# Patient Record
Sex: MALE | Race: WHITE | NOT HISPANIC OR LATINO | Employment: OTHER | URBAN - NONMETROPOLITAN AREA
[De-identification: names, ages, dates, MRNs, and addresses within clinical notes are randomized per-mention and may not be internally consistent; named-entity substitution may affect disease eponyms.]

---

## 2017-01-15 ENCOUNTER — APPOINTMENT (OUTPATIENT)
Dept: CT IMAGING | Facility: HOSPITAL | Age: 77
End: 2017-01-15

## 2017-01-15 ENCOUNTER — APPOINTMENT (OUTPATIENT)
Dept: GENERAL RADIOLOGY | Facility: HOSPITAL | Age: 77
End: 2017-01-15

## 2017-01-15 ENCOUNTER — HOSPITAL ENCOUNTER (INPATIENT)
Facility: HOSPITAL | Age: 77
LOS: 2 days | Discharge: HOME | End: 2017-01-17
Attending: FAMILY MEDICINE | Admitting: INTERNAL MEDICINE

## 2017-01-15 DIAGNOSIS — J40 BRONCHITIS: ICD-10-CM

## 2017-01-15 DIAGNOSIS — Z74.09 IMPAIRED FUNCTIONAL MOBILITY, BALANCE, GAIT, AND ENDURANCE: ICD-10-CM

## 2017-01-15 DIAGNOSIS — Z78.9 IMPAIRED MOBILITY AND ADLS: ICD-10-CM

## 2017-01-15 DIAGNOSIS — Z74.09 IMPAIRED MOBILITY AND ADLS: ICD-10-CM

## 2017-01-15 DIAGNOSIS — R13.10 DYSPHAGIA, UNSPECIFIED TYPE: ICD-10-CM

## 2017-01-15 DIAGNOSIS — R11.2 NAUSEA AND VOMITING, INTRACTABILITY OF VOMITING NOT SPECIFIED, UNSPECIFIED VOMITING TYPE: ICD-10-CM

## 2017-01-15 DIAGNOSIS — T17.908A ASPIRATION INTO AIRWAY, INITIAL ENCOUNTER: Primary | ICD-10-CM

## 2017-01-15 PROBLEM — I10 HYPERTENSION: Status: ACTIVE | Noted: 2017-01-15

## 2017-01-15 PROBLEM — G25.9 MOVEMENT DISORDER: Status: ACTIVE | Noted: 2017-01-15

## 2017-01-15 LAB
ALBUMIN SERPL-MCNC: 4.5 G/DL (ref 3.5–5)
ALBUMIN/GLOB SERPL: 1.2 G/DL (ref 1.1–2.5)
ALP SERPL-CCNC: 64 U/L (ref 24–120)
ALT SERPL W P-5'-P-CCNC: 36 U/L (ref 0–54)
AMYLASE SERPL-CCNC: 127 U/L (ref 30–110)
ANION GAP SERPL CALCULATED.3IONS-SCNC: 13 MMOL/L (ref 4–13)
ANION GAP SERPL CALCULATED.3IONS-SCNC: 14 MMOL/L (ref 4–13)
AST SERPL-CCNC: 30 U/L (ref 7–45)
BASOPHILS # BLD AUTO: 0.01 10*3/MM3 (ref 0–0.2)
BASOPHILS NFR BLD AUTO: 0.1 % (ref 0–2)
BILIRUB SERPL-MCNC: 0.7 MG/DL (ref 0.1–1)
BILIRUB UR QL STRIP: NEGATIVE
BUN BLD-MCNC: 15 MG/DL (ref 5–21)
BUN BLD-MCNC: 17 MG/DL (ref 5–21)
BUN/CREAT SERPL: 14.7 (ref 7–25)
BUN/CREAT SERPL: 16.3 (ref 7–25)
CALCIUM SPEC-SCNC: 8.4 MG/DL (ref 8.4–10.4)
CALCIUM SPEC-SCNC: 8.4 MG/DL (ref 8.4–10.4)
CHLORIDE SERPL-SCNC: 100 MMOL/L (ref 98–110)
CHLORIDE SERPL-SCNC: 97 MMOL/L (ref 98–110)
CLARITY UR: CLEAR
CO2 SERPL-SCNC: 26 MMOL/L (ref 24–31)
CO2 SERPL-SCNC: 26 MMOL/L (ref 24–31)
COLOR UR: YELLOW
CREAT BLD-MCNC: 1.02 MG/DL (ref 0.5–1.4)
CREAT BLD-MCNC: 1.04 MG/DL (ref 0.5–1.4)
DEPRECATED RDW RBC AUTO: 44.8 FL (ref 40–54)
DEPRECATED RDW RBC AUTO: 45 FL (ref 40–54)
EOSINOPHIL # BLD AUTO: 0.01 10*3/MM3 (ref 0–0.7)
EOSINOPHIL NFR BLD AUTO: 0.1 % (ref 0–4)
ERYTHROCYTE [DISTWIDTH] IN BLOOD BY AUTOMATED COUNT: 14 % (ref 12–15)
ERYTHROCYTE [DISTWIDTH] IN BLOOD BY AUTOMATED COUNT: 14.1 % (ref 12–15)
FLUAV AG NPH QL: NEGATIVE
FLUBV AG NPH QL IA: NEGATIVE
GFR SERPL CREATININE-BSD FRML MDRD: 70 ML/MIN/1.73
GFR SERPL CREATININE-BSD FRML MDRD: 71 ML/MIN/1.73
GLOBULIN UR ELPH-MCNC: 3.7 GM/DL
GLUCOSE BLD-MCNC: 105 MG/DL (ref 70–100)
GLUCOSE BLD-MCNC: 111 MG/DL (ref 70–100)
GLUCOSE UR STRIP-MCNC: NEGATIVE MG/DL
HCT VFR BLD AUTO: 40.2 % (ref 40–52)
HCT VFR BLD AUTO: 41.2 % (ref 40–52)
HGB BLD-MCNC: 14 G/DL (ref 14–18)
HGB BLD-MCNC: 14.3 G/DL (ref 14–18)
HGB UR QL STRIP.AUTO: NEGATIVE
HOLD SPECIMEN: NORMAL
HOLD SPECIMEN: NORMAL
IMM GRANULOCYTES # BLD: 0.08 10*3/MM3 (ref 0–0.03)
IMM GRANULOCYTES NFR BLD: 0.9 % (ref 0–5)
INR PPP: 1.09 (ref 0.91–1.09)
KETONES UR QL STRIP: NEGATIVE
LEUKOCYTE ESTERASE UR QL STRIP.AUTO: NEGATIVE
LIPASE SERPL-CCNC: 121 U/L (ref 23–203)
LYMPHOCYTES # BLD AUTO: 1.19 10*3/MM3 (ref 0.72–4.86)
LYMPHOCYTES # BLD MANUAL: 0.16 10*3/MM3 (ref 0.72–4.86)
LYMPHOCYTES NFR BLD AUTO: 12.8 % (ref 15–45)
LYMPHOCYTES NFR BLD MANUAL: 1 % (ref 15–45)
LYMPHOCYTES NFR BLD MANUAL: 20 % (ref 4–12)
MCH RBC QN AUTO: 30.5 PG (ref 28–32)
MCH RBC QN AUTO: 30.5 PG (ref 28–32)
MCHC RBC AUTO-ENTMCNC: 34.7 G/DL (ref 33–36)
MCHC RBC AUTO-ENTMCNC: 34.8 G/DL (ref 33–36)
MCV RBC AUTO: 87.6 FL (ref 82–95)
MCV RBC AUTO: 87.8 FL (ref 82–95)
METAMYELOCYTES NFR BLD MANUAL: 0 % (ref 0–0)
MONOCYTES # BLD AUTO: 3.22 10*3/MM3 (ref 0.19–1.3)
MONOCYTES # BLD AUTO: 4.28 10*3/MM3 (ref 0.19–1.3)
MONOCYTES NFR BLD AUTO: 46.1 % (ref 4–12)
NEUTROPHILS # BLD AUTO: 10.79 10*3/MM3 (ref 1.87–8.4)
NEUTROPHILS # BLD AUTO: 3.71 10*3/MM3 (ref 1.87–8.4)
NEUTROPHILS NFR BLD AUTO: 40 % (ref 39–78)
NEUTROPHILS NFR BLD MANUAL: 55 % (ref 39–78)
NEUTS BAND NFR BLD MANUAL: 12 % (ref 0–10)
NITRITE UR QL STRIP: NEGATIVE
PH UR STRIP.AUTO: <=5 [PH] (ref 5–8)
PLAT MORPH BLD: NORMAL
PLATELET # BLD AUTO: 120 10*3/MM3 (ref 130–400)
PLATELET # BLD AUTO: 123 10*3/MM3 (ref 130–400)
PMV BLD AUTO: 10.9 FL (ref 6–12)
PMV BLD AUTO: 11 FL (ref 6–12)
POIKILOCYTOSIS BLD QL SMEAR: ABNORMAL
POTASSIUM BLD-SCNC: 4 MMOL/L (ref 3.5–5.3)
POTASSIUM BLD-SCNC: 4.1 MMOL/L (ref 3.5–5.3)
PROT SERPL-MCNC: 8.2 G/DL (ref 6.3–8.7)
PROT UR QL STRIP: NEGATIVE
PROTHROMBIN TIME: 14.5 SECONDS (ref 11.9–14.6)
RBC # BLD AUTO: 4.59 10*6/MM3 (ref 4.8–5.9)
RBC # BLD AUTO: 4.69 10*6/MM3 (ref 4.8–5.9)
RBC MORPH BLD: NORMAL
SCAN SLIDE: NORMAL
SMALL PLATELETS BLD QL SMEAR: ABNORMAL
SMALL PLATELETS BLD QL SMEAR: NORMAL
SODIUM BLD-SCNC: 137 MMOL/L (ref 135–145)
SODIUM BLD-SCNC: 139 MMOL/L (ref 135–145)
SP GR UR STRIP: 1.02 (ref 1–1.03)
TROPONIN I SERPL-MCNC: 0 NG/ML (ref 0–0.07)
UROBILINOGEN UR QL STRIP: NORMAL
VARIANT LYMPHS NFR BLD MANUAL: 12 % (ref 0–5)
WBC MORPH BLD: NORMAL
WBC MORPH BLD: NORMAL
WBC NRBC COR # BLD: 16.11 10*3/MM3 (ref 4.8–10.8)
WBC NRBC COR # BLD: 9.28 10*3/MM3 (ref 4.8–10.8)
WHOLE BLOOD HOLD SPECIMEN: NORMAL
WHOLE BLOOD HOLD SPECIMEN: NORMAL

## 2017-01-15 PROCEDURE — 85025 COMPLETE CBC W/AUTO DIFF WBC: CPT | Performed by: FAMILY MEDICINE

## 2017-01-15 PROCEDURE — 25010000002 ONDANSETRON PER 1 MG: Performed by: FAMILY MEDICINE

## 2017-01-15 PROCEDURE — 74020 HC XR ABDOMEN FLAT & UPRIGHT: CPT

## 2017-01-15 PROCEDURE — 82150 ASSAY OF AMYLASE: CPT | Performed by: FAMILY MEDICINE

## 2017-01-15 PROCEDURE — 80053 COMPREHEN METABOLIC PANEL: CPT | Performed by: FAMILY MEDICINE

## 2017-01-15 PROCEDURE — 99285 EMERGENCY DEPT VISIT HI MDM: CPT

## 2017-01-15 PROCEDURE — 93005 ELECTROCARDIOGRAM TRACING: CPT | Performed by: FAMILY MEDICINE

## 2017-01-15 PROCEDURE — 87804 INFLUENZA ASSAY W/OPTIC: CPT | Performed by: FAMILY MEDICINE

## 2017-01-15 PROCEDURE — 70450 CT HEAD/BRAIN W/O DYE: CPT

## 2017-01-15 PROCEDURE — 81003 URINALYSIS AUTO W/O SCOPE: CPT | Performed by: FAMILY MEDICINE

## 2017-01-15 PROCEDURE — 93010 ELECTROCARDIOGRAM REPORT: CPT | Performed by: INTERNAL MEDICINE

## 2017-01-15 PROCEDURE — 83690 ASSAY OF LIPASE: CPT | Performed by: FAMILY MEDICINE

## 2017-01-15 PROCEDURE — 84484 ASSAY OF TROPONIN QUANT: CPT

## 2017-01-15 PROCEDURE — 85007 BL SMEAR W/DIFF WBC COUNT: CPT | Performed by: FAMILY MEDICINE

## 2017-01-15 PROCEDURE — 85610 PROTHROMBIN TIME: CPT | Performed by: FAMILY MEDICINE

## 2017-01-15 PROCEDURE — 71010 HC CHEST PA OR AP: CPT

## 2017-01-15 PROCEDURE — 25010000002 CEFTRIAXONE: Performed by: FAMILY MEDICINE

## 2017-01-15 PROCEDURE — 36415 COLL VENOUS BLD VENIPUNCTURE: CPT

## 2017-01-15 PROCEDURE — 25010000002 ENOXAPARIN PER 10 MG: Performed by: FAMILY MEDICINE

## 2017-01-15 RX ORDER — ONDANSETRON 4 MG/1
4 TABLET, ORALLY DISINTEGRATING ORAL 4 TIMES DAILY PRN
Qty: 20 TABLET | Refills: 0 | Status: SHIPPED | OUTPATIENT
Start: 2017-01-15 | End: 2017-01-17 | Stop reason: HOSPADM

## 2017-01-15 RX ORDER — CLONIDINE HYDROCHLORIDE 0.1 MG/1
0.1 TABLET ORAL ONCE
Status: COMPLETED | OUTPATIENT
Start: 2017-01-15 | End: 2017-01-15

## 2017-01-15 RX ORDER — SODIUM CHLORIDE 9 MG/ML
125 INJECTION, SOLUTION INTRAVENOUS CONTINUOUS
Status: DISCONTINUED | OUTPATIENT
Start: 2017-01-15 | End: 2017-01-17

## 2017-01-15 RX ORDER — LISINOPRIL 10 MG/1
5 TABLET ORAL DAILY
COMMUNITY

## 2017-01-15 RX ORDER — BENZONATATE 100 MG/1
100 CAPSULE ORAL 3 TIMES DAILY PRN
Qty: 15 CAPSULE | Refills: 0 | Status: SHIPPED | OUTPATIENT
Start: 2017-01-15 | End: 2017-01-17 | Stop reason: HOSPADM

## 2017-01-15 RX ORDER — SODIUM CHLORIDE 0.9 % (FLUSH) 0.9 %
1-10 SYRINGE (ML) INJECTION AS NEEDED
Status: DISCONTINUED | OUTPATIENT
Start: 2017-01-15 | End: 2017-01-17 | Stop reason: HOSPADM

## 2017-01-15 RX ORDER — DOXYCYCLINE 100 MG/1
100 CAPSULE ORAL 2 TIMES DAILY
Qty: 14 CAPSULE | Refills: 0 | Status: SHIPPED | OUTPATIENT
Start: 2017-01-15 | End: 2017-01-17

## 2017-01-15 RX ORDER — ASPIRIN 81 MG/1
81 TABLET ORAL DAILY
COMMUNITY
End: 2017-11-02 | Stop reason: HOSPADM

## 2017-01-15 RX ORDER — ONDANSETRON 2 MG/ML
4 INJECTION INTRAMUSCULAR; INTRAVENOUS ONCE
Status: COMPLETED | OUTPATIENT
Start: 2017-01-15 | End: 2017-01-15

## 2017-01-15 RX ORDER — ONDANSETRON 2 MG/ML
4 INJECTION INTRAMUSCULAR; INTRAVENOUS EVERY 6 HOURS PRN
Status: DISCONTINUED | OUTPATIENT
Start: 2017-01-15 | End: 2017-01-17 | Stop reason: HOSPADM

## 2017-01-15 RX ORDER — GUAIFENESIN, PSEUDOEPHEDRINE HYDROCHLORIDE 600; 60 MG/1; MG/1
1 TABLET, EXTENDED RELEASE ORAL EVERY 12 HOURS
COMMUNITY

## 2017-01-15 RX ADMIN — SODIUM CHLORIDE 500 ML: 0.9 INJECTION, SOLUTION INTRAVENOUS at 11:55

## 2017-01-15 RX ADMIN — ENOXAPARIN SODIUM 40 MG: 40 INJECTION SUBCUTANEOUS at 20:36

## 2017-01-15 RX ADMIN — CLONIDINE HYDROCHLORIDE 0.1 MG: 0.1 TABLET ORAL at 12:27

## 2017-01-15 RX ADMIN — ONDANSETRON 4 MG: 2 INJECTION INTRAMUSCULAR; INTRAVENOUS at 12:25

## 2017-01-15 RX ADMIN — CEFTRIAXONE 1 G: 1 INJECTION, POWDER, FOR SOLUTION INTRAMUSCULAR; INTRAVENOUS at 14:51

## 2017-01-15 NOTE — ED NOTES
Patient was given 6.25 mg phenergan and 250 ml NS bolus per ems.     Shazia Quevedo RN  01/15/17 3277

## 2017-01-15 NOTE — H&P
[facility] Medicine Services  HISTORY AND PHYSICAL    Date of Admission: 1/15/2017  Primary Care Physician: Brodie Pollard MD    Subjective     Chief Complaint:   Cough, choking and nausea.    History of Present Illness  This 76-year-old white male patient has a four-day history of a cough with flulike symptoms.  The patient is very tangential in answers to questions.  Family members complaining of slurred speech and difficulty with articulation.  I will in the emergency room he had an episode consistent with aspiration with respiratory distress.  He appears be having difficulty handling oral secretions.        Review of Systems   HENT: Positive for voice change.    Eyes: Negative.    Respiratory: Positive for cough and choking.    Cardiovascular: Negative.    Gastrointestinal: Positive for nausea.   Endocrine: Negative.    Genitourinary: Negative.    Musculoskeletal: Negative.    Skin: Negative.    Allergic/Immunologic: Negative.    Neurological: Positive for speech difficulty.   Hematological: Negative.    Psychiatric/Behavioral: Negative.             Otherwise complete ROS reviewed and negative except as mentioned in the HPI.      Past Medical History:     Past Medical History   Diagnosis Date   • Atypical chest pain    • Hypertension    • Sciatica        Past Surgical History:  Past Surgical History   Procedure Laterality Date   • Cholecystectomy     • Hernia repair     • Calcaneous open reduction internal fixation Bilateral        Family History:   family history includes Cancer in his mother; No Known Problems in his brother, brother, father, sister, and sister.    Social History:    reports that he has quit smoking. He has a 30.00 pack-year smoking history. He does not have any smokeless tobacco history on file. He reports that he does not drink alcohol or use illicit drugs.    Medications:  Prior to Admission medications    Medication Sig Start Date End Date Taking?  "Authorizing Provider   aspirin 81 MG EC tablet Take 81 mg by mouth Daily.   Yes Historical Provider, MD   lisinopril (PRINIVIL,ZESTRIL) 10 MG tablet Take 5 mg by mouth Daily.   Yes Historical Provider, MD   pseudoephedrine-guaifenesin (MUCINEX D)  MG per 12 hr tablet Take 1 tablet by mouth Every 12 (Twelve) Hours.   Yes Historical Provider, MD   benzonatate (TESSALON) 100 MG capsule Take 1 capsule by mouth 3 (Three) Times a Day As Needed for cough for up to 15 doses. 1/15/17   Melo Hayward MD   doxycycline (MONODOX) 100 MG capsule Take 1 capsule by mouth 2 (Two) Times a Day for 7 days. 1/15/17 1/22/17  Melo Hayward MD   ondansetron ODT (ZOFRAN-ODT) 4 MG disintegrating tablet Take 1 tablet by mouth 4 (Four) Times a Day As Needed for nausea or vomiting. 1/15/17   Melo Hayward MD       Allergies:  No Known Allergies    Objective     Vital Signs:   Visit Vitals   • /81   • Pulse 100   • Temp 99.6 °F (37.6 °C) (Oral)   • Resp 25   • Ht 71\" (180.3 cm)   • Wt 175 lb (79.4 kg)   • SpO2 94%   • BMI 24.41 kg/m2       Physical Exam   Constitutional: He is oriented to person, place, and time. He appears well-developed and well-nourished. No distress.   HENT:   Head: Normocephalic and atraumatic.   Right Ear: External ear normal.   Left Ear: External ear normal.   Nose: Nose normal.   Mouth/Throat: Oropharynx is clear and moist.   Eyes: Conjunctivae and EOM are normal. Pupils are equal, round, and reactive to light. No scleral icterus.   Neck: Normal range of motion. Neck supple. No JVD present. No tracheal deviation present. No thyromegaly present.   Cardiovascular: Normal rate, regular rhythm, normal heart sounds and intact distal pulses.  Exam reveals no friction rub.    No murmur heard.  Pulmonary/Chest: Effort normal and breath sounds normal. No respiratory distress. He has no wheezes. He has no rales.   Abdominal: Soft. Bowel sounds are normal. He exhibits no distension and no mass. There is " no tenderness.   Musculoskeletal: Normal range of motion. He exhibits no edema or tenderness.   Neurological: He is alert and oriented to person, place, and time.   Reflex Scores:       Brachioradialis reflexes are 3+ on the right side and 3+ on the left side.       Patellar reflexes are 2+ on the right side and 1+ on the left side.  The patient displays athetoid-like fidgety movements of the upper extremities, neck and head.  His speech is slurred and difficult to understand and he is having difficulty organizing swallow of mucoid secretions.   Skin: Skin is warm and dry. No rash noted. No erythema.   Psychiatric: He has a normal mood and affect. His behavior is normal. Judgment and thought content normal.           Results Reviewed:  Lab Results (last 24 hours)     Procedure Component Value Units Date/Time    POC Troponin, Rapid [[id number]]  (Normal) Collected:  01/15/17 1222    Specimen:  Blood Updated:  01/15/17 1235     Troponin I 0.00 ng/mL       Serial Number: [id number]    : [id number]       Comprehensive Metabolic Panel [[id number]]  (Abnormal) Collected:  01/15/17 1208    Specimen:  Blood from Arm, Right Updated:  01/15/17 1246     Glucose 105 (H) mg/dL      BUN 17 mg/dL      Creatinine 1.04 mg/dL      Sodium 139 mmol/L      Potassium 4.1 mmol/L      Chloride 100 mmol/L      CO2 26.0 mmol/L      Calcium 8.4 mg/dL      Total Protein 8.2 g/dL      Albumin 4.50 g/dL      ALT (SGPT) 36 U/L      AST (SGOT) 30 U/L      Alkaline Phosphatase 64 U/L      Total Bilirubin 0.7 mg/dL      eGFR Non African Amer 70 mL/min/1.73      Globulin 3.7 gm/dL      A/G Ratio 1.2 g/dL      BUN/Creatinine Ratio 16.3      Anion Gap 13.0 mmol/L     Narrative:       The MDRD GFR formula is only valid for adults with stable renal function between ages 18 and 70.    Amylase [[id number]]  (Abnormal) Collected:  01/15/17 1208    Specimen:  Blood from Arm, Right Updated:  01/15/17 1246     Amylase 127 (H) U/L     Lipase [[id number]]  (Normal)  Collected:  01/15/17 1208    Specimen:  Blood from Arm, Right Updated:  01/15/17 1246     Lipase 121 U/L     Influenza Antigen [[id number]]  (Normal) Collected:  01/15/17 1203    Specimen:  Swab from Nasopharynx Updated:  01/15/17 1254     Influenza A Ag, EIA Negative      Influenza B Ag, EIA Negative     CBC Auto Differential [[id number]]  (Abnormal) Collected:  01/15/17 1208    Specimen:  Blood from Arm, Right Updated:  01/15/17 1258     WBC 9.28 10*3/mm3      RBC 4.69 (L) 10*6/mm3      Hemoglobin 14.3 g/dL      Hematocrit 41.2 %      MCV 87.8 fL      MCH 30.5 pg      MCHC 34.7 g/dL      RDW 14.0 %      RDW-SD 45.0 fl      MPV 11.0 fL      Platelets 120 (L) 10*3/mm3      Neutrophil % 40.0 %      Lymphocyte % 12.8 (L) %      Monocyte % 46.1 (H) %      Eosinophil % 0.1 %      Basophil % 0.1 %      Immature Grans % 0.9 %      Neutrophils, Absolute 3.71 10*3/mm3      Lymphocytes, Absolute 1.19 10*3/mm3      Monocytes, Absolute 4.28 (H) 10*3/mm3      Eosinophils, Absolute 0.01 10*3/mm3      Basophils, Absolute 0.01 10*3/mm3      Immature Grans, Absolute 0.08 (H) 10*3/mm3     Scan Slide [[id number]] Collected:  01/15/17 1208    Specimen:  Blood from Arm, Right Updated:  01/15/17 1258     RBC Morphology Normal      WBC Morphology Normal      Platelet Estimate Decreased     Urinalysis With / Culture If Indicated [[id number]]  (Normal) Collected:  01/15/17 1326    Specimen:  Urine from Urine, Clean Catch Updated:  01/15/17 1340     Color, UA Yellow      Appearance, UA Clear      pH, UA <=5.0      Specific Gravity, UA 1.018      Glucose, UA Negative      Ketones, UA Negative      Bilirubin, UA Negative      Blood, UA Negative      Protein, UA Negative      Leuk Esterase, UA Negative      Nitrite, UA Negative      Urobilinogen, UA 0.2 E.U./dL       Auto resulted.             Xr Abdomen Flat & Upright    Result Date: 1/15/2017  Narrative: ABDOMEN FLAT AND UPRIGHT 1/15/2017 1:20 PM CST  HISTORY: Nausea  COMPARISON: 01/19/2012   FINDINGS: The lung bases are clear. There is a nonspecific bowel gas pattern with gas in both small bowel and colon. No evidence of mechanical obstruction.. No free intraperitoneal air is present. No pathologic calcification is seen.  The osseous structures are normal in appearance.      Impression: Nonspecific bowel gas pattern. No evidence of mechanical obstruction or pneumoperitoneum..  This report was finalized on 01/15/2017 13:41 by Dr. Otilio Garcia MD.    Ct Head Without Contrast    Result Date: 1/15/2017  Narrative: CT BRAIN without contrast 1/15/2017 2:35 PM CST  HISTORY: Speech issues. Involuntary movement.  COMPARISON: 03/16/2016  DLP: 1330 mGy cm. Automated exposure control was utilized to diminish patient radiation dose.  TECHNIQUE: Serial axial tomographic images of the brain were obtained without the use of intravenous contrast.  FINDINGS: The midline structures are nondisplaced. There is moderate cerebral and cerebellar volume loss, with an associated increase in the prominence of the ventricles and sulci. The basilar cisterns are normal in size and configuration. There is no evidence of intracranial hemorrhage or mass-effect. There is low attenuation in the periventricular white matter, consistent with chronic ischemic change. There are no abnormal extra-axial fluid collections. There is no evidence of tonsillar herniation.  The orbits are intact. The patient has undergone previous fixation for a left superior orbital rim fracture.. The visualized paranasal sinuses, mastoid air cells and middle ear cavities are clear. The visualized osseous structures and overlying soft tissues of the skull and face are intact.      Impression: Moderate cerebral and cerebellar volume loss with chronic microvascular disease but no evidence of acute intracranial process.   This report was finalized on 01/15/2017 14:51 by Dr. Otilio Garcia MD.    Xr Chest 1 View    Result Date: 1/15/2017  Narrative: HISTORY:  Cough.  FINDINGS: Today's exam is compared to previous study dated 01/19/2012. There are emphysematous changes within the upper lobes. There is no evidence of acute lobar pneumonia or effusion. There is some tortuosity of the thoracic aorta. There is an old chronic right a.c. separation.      Impression: . No active disease. This report was finalized on 01/15/2017 14:01 by Dr. Otilio Garcia MD.     I have personally reviewed and interpreted the radiology studies and ECG obtained at time of admission.     Assessment / Plan      Assessment & Plan  Hospital Problem List     Aspiration into airway    Hypertension    Movement disorder      Possible [place]'s Chorea    PLAN:     Admit to medical surgical floor  Clindamycin 300 mg every 8 hours  Neurology consultation.  MRI scan of the brain  PT/OT eval/Tx    Code Status:   DO NOT RESUSCITATE aggressive  Surrogate decision maker is the patient's daughter      I discussed the patients findings and my recommendations with: patient    Estimated length of stay: 3-4 days    Chito Klein DO   01/15/17   5:39 PM

## 2017-01-15 NOTE — ED PROVIDER NOTES
Subjective   HPI Comments: Patient began coughing about 4 days ago and felt like he had the flu.  He has not sought medical care until today.  He reports that he never takes the flu shot.  This patient is very difficult to get a history from as he appears incapable of answering a question directly.  He has answered questions asked in a very indirect fashion.  My visit with him last year was the same, at that time the brother indicated that is his normal behavior.     Sister is here and she reports that the patients speech is different.  I advised her that I noticed that his congestion and sore throat have altered the sound of his voice.  The sister states that he has had difficulty articulating the english language since he lived in [place] for so long.     While attempting to discharge the patient had episodes that resemble aspiration with respirtory distress, patient now appears to be having some difficulty with oral secretions.    Patient is a 76 y.o. male presenting with nausea.   Nausea   The primary symptoms include abdominal pain, nausea, vomiting, diarrhea, myalgias and arthralgias. The illness began today. The onset was sudden.   Nausea began today. The nausea is associated with eating. The nausea is exacerbated by food and activity.   The vomiting began today. Vomiting occurs 2 to 5 times per day. The emesis contains stomach contents.   The myalgias are associated with weakness.   The illness is also significant for chills and anorexia. The illness does not include constipation.       Review of Systems   Constitutional: Positive for activity change, appetite change and chills.   HENT: Positive for congestion and rhinorrhea. Negative for drooling, ear discharge, ear pain, facial swelling, hearing loss, mouth sores, nosebleeds, postnasal drip, sore throat, trouble swallowing and voice change.    Respiratory: Positive for cough and shortness of breath. Negative for apnea, choking and chest tightness.     Cardiovascular: Negative for chest pain, palpitations and leg swelling.   Gastrointestinal: Positive for abdominal pain, anorexia, diarrhea, nausea and vomiting. Negative for abdominal distention, blood in stool and constipation.   Genitourinary: Negative for decreased urine volume, difficulty urinating, flank pain, hematuria and testicular pain.   Musculoskeletal: Positive for arthralgias and myalgias.   Skin: Positive for color change and pallor.   Neurological: Positive for weakness. Negative for dizziness and light-headedness.   Psychiatric/Behavioral: Negative for agitation and confusion. The patient is nervous/anxious.        Past Medical History   Diagnosis Date   • Atypical chest pain    • Hypertension    • Sciatica        No Known Allergies    Past Surgical History   Procedure Laterality Date   • Cholecystectomy     • Hernia repair     • Calcaneous open reduction internal fixation Bilateral        History reviewed. No pertinent family history.    Social History     Social History   • Marital status: Single     Spouse name: N/A   • Number of children: N/A   • Years of education: N/A     Social History Main Topics   • Smoking status: Never Smoker   • Smokeless tobacco: None   • Alcohol use No   • Drug use: No   • Sexual activity: Defer     Other Topics Concern   • None     Social History Narrative   • None           Objective   Physical Exam   Constitutional: He is oriented to person, place, and time. He appears well-developed and well-nourished. No distress.   HENT:   Head: Atraumatic.   Nose: Nose normal.   Mouth/Throat: Mucous membranes are pale and dry.   Eyes: Conjunctivae are normal. Pupils are equal, round, and reactive to light. No scleral icterus.   Neck: Normal range of motion. Neck supple. No JVD present. No thyromegaly present.   Cardiovascular: Normal rate, regular rhythm, normal heart sounds and intact distal pulses.    No murmur heard.  Pulmonary/Chest: Effort normal and breath sounds normal.  No respiratory distress. He has no wheezes. He has no rales. He exhibits no tenderness.   Mild upper airway congestion.   Abdominal: Soft. Bowel sounds are normal. He exhibits no distension and no mass. There is tenderness. There is no rebound and no guarding.       Musculoskeletal: Normal range of motion. He exhibits no edema.   Lymphadenopathy:     He has no cervical adenopathy.   Neurological: He is alert and oriented to person, place, and time. No cranial nerve deficit.   movent that to an extent resembles chorea     Skin: Skin is warm and dry. No rash noted. He is not diaphoretic. No erythema. No pallor.   Psychiatric: He has a normal mood and affect. His behavior is normal. Judgment and thought content normal.   Nursing note and vitals reviewed.      Procedures         ED Course  ED Course        Labs Reviewed   COMPREHENSIVE METABOLIC PANEL - Abnormal; Notable for the following:        Result Value    Glucose 105 (*)     All other components within normal limits    Narrative:     The MDRD GFR formula is only valid for adults with stable renal function between ages 18 and 70.   AMYLASE - Abnormal; Notable for the following:     Amylase 127 (*)     All other components within normal limits   CBC WITH AUTO DIFFERENTIAL - Abnormal; Notable for the following:     RBC 4.69 (*)     Platelets 120 (*)     Lymphocyte % 12.8 (*)     Monocyte % 46.1 (*)     Monocytes, Absolute 4.28 (*)     Immature Grans, Absolute 0.08 (*)     All other components within normal limits   INFLUENZA ANTIGEN - Normal    Narrative:     Recommend confirmation of negative results by viral culture or molecular assay.   LIPASE - Normal   URINALYSIS W/ CULTURE IF INDICATED - Normal    Narrative:     Urine microscopic not indicated.   POCT TROPONIN I, RAPID - Normal   SCAN SLIDE   RAINBOW DRAW    Narrative:     The following orders were created for panel order [place] Draw.  Procedure                               Abnormality         Status                      ---------                               -----------         ------                     Light Blue Top[[id number]]                                    In process                 Green Top (Gel)[[id number]]                                   In process                 Lavender Top[[id number]]                                      In process                 Red Top[[id number]]                                           In process                 Green Top (No Gel)[[id number]]                                                             Please view results for these tests on the individual orders.   POCT TROPONIN I, RAPID   CBC AND DIFFERENTIAL    Narrative:     The following orders were created for panel order CBC & Differential.  Procedure                               Abnormality         Status                     ---------                               -----------         ------                     Scan Slide[[id number]]                                        Final result               CBC Auto Differential[[id number]]         Abnormal            Final result                 Please view results for these tests on the individual orders.   LIGHT BLUE TOP   GREEN TOP   LAVENDER TOP   RED TOP   GREEN TOP NO GEL     CT Head Without Contrast   Final Result   Moderate cerebral and cerebellar volume loss with chronic microvascular   disease but no evidence of acute intracranial process.           This report was finalized on 01/15/2017 14:51 by Dr. Otilio Garcia MD.      XR Chest 1 View   Final Result   . No active disease.   This report was finalized on 01/15/2017 14:01 by Dr. Otilio Garcia MD.      XR Abdomen Flat & Upright   Final Result   Nonspecific bowel gas pattern. No evidence of mechanical obstruction or   pneumoperitoneum..       This report was finalized on 01/15/2017 13:41 by Dr. Otilio Garcia MD.                  MDM  Number of Diagnoses or Management Options  Aspiration into airway, initial encounter:  new and requires workup  Bronchitis: new and requires workup  Nausea and vomiting, intractability of vomiting not specified, unspecified vomiting type: new and requires workup     Amount and/or Complexity of Data Reviewed  Clinical lab tests: ordered and reviewed  Tests in the radiology section of CPT®: ordered and reviewed  Decide to obtain previous medical records or to obtain history from someone other than the patient: yes  Obtain history from someone other than the patient: yes  Review and summarize past medical records: yes  Discuss the patient with other providers: yes  Independent visualization of images, tracings, or specimens: yes    Risk of Complications, Morbidity, and/or Mortality  Presenting problems: high  Diagnostic procedures: high  Management options: high    Patient Progress  Patient progress: other (comment) (Patient has had worsening of his symptoms since arrival, no tpa considered due to sub acute nature of symptoms.  Onset was possibly days ago.  )      Final diagnoses:   Bronchitis   Nausea and vomiting, intractability of vomiting not specified, unspecified vomiting type   Aspiration into airway, initial encounter            Melo Hayward MD  01/15/17 2623

## 2017-01-15 NOTE — ED NOTES
Patient had c/o dizziness and became pale. Patient had syncopal episode while sitting up in wheelchair. Dr. Hayward and several RNs at bedside. Patient placed back on stretcher and on monitor.      Shazia Quevedo RN  01/15/17 7412

## 2017-01-15 NOTE — ED NOTES
Patient states he has recently had the flu. Patient states he has not been treated, but has had flu like symptoms. Patient has had productive cough. Patient states he has been coughing so much he vomits. Patient coughing and spitting up thick, green-colored mucus.     Shazia Quevedo RN  01/15/17 3766

## 2017-01-15 NOTE — IP AVS SNAPSHOT
AFTER VISIT SUMMARY             Clay Rivas           About your hospitalization     You were admitted on:  January 15, 2017 You last received care in the:  [address]       Procedures & Surgeries         Medications    If you or your caregiver advised us that you are currently taking a medication and that medication is marked below as “Resume”, this simply indicates that we have reviewed those medications to make sure our new therapy recommendations do not interfere.  If you have concerns about medications other than those new ones which we are prescribing today, please consult the physician who prescribed them (or your primary physician).  Our review of your home medications is not meant to indicate that we are directing their use.             Your Medications      START taking these medications     doxycycline 100 MG capsule   Take 1 capsule by mouth 2 (Two) Times a Day for 7 days.   Commonly known as:  MONODOX           MethylPREDNISolone 4 MG tablet   Take as directed on package instructions.   Commonly known as:  MEDROL (OWEN)             CONTINUE taking these medications     aspirin 81 MG EC tablet   Take 81 mg by mouth Daily.           lisinopril 10 MG tablet   Take 5 mg by mouth Daily.   Commonly known as:  PRINIVIL,ZESTRIL           pseudoephedrine-guaifenesin  MG per 12 hr tablet   Take 1 tablet by mouth Every 12 (Twelve) Hours.   Commonly known as:  MUCINEX D                Where to Get Your Medications      You can get these medications from any pharmacy     Bring a paper prescription for each of these medications     doxycycline 100 MG capsule    MethylPREDNISolone 4 MG tablet                  Your Medications      Your Medication List           Morning Noon Evening Bedtime As Needed    aspirin 81 MG EC tablet   Take 81 mg by mouth Daily.                                   doxycycline 100 MG capsule   Take 1 capsule by mouth 2 (Two) Times a Day for 7 days.   Commonly known  as:  MONODOX                                      lisinopril 10 MG tablet   Take 5 mg by mouth Daily.   Commonly known as:  PRINIVIL,ZESTRIL                                   MethylPREDNISolone 4 MG tablet   Take as directed on package instructions.   Commonly known as:  MEDROL (OWEN)                                   pseudoephedrine-guaifenesin  MG per 12 hr tablet   Take 1 tablet by mouth Every 12 (Twelve) Hours.   Commonly known as:  MUCINEX D                                               Instructions for After Discharge        Activity Instructions     Activity as Tolerated                 Diet Instructions     Diet: Consistent Carbohydrate, Cardiac, Soft Texture; Pudding Thick Liquids; Ground       Discharge Diet:   Consistent Carbohydrate  Cardiac  Soft Texture      Fluid Consistency:  Pudding Thick Liquids   Soft Options:  Ground   Diet: ground, soft, pudding thick       Diet: Consistent Carbohydrate, Cardiac, Soft Texture; Pudding Thick Liquids; Ground       Discharge Diet:   Consistent Carbohydrate  Cardiac  Soft Texture      Fluid Consistency:  Pudding Thick Liquids   Soft Options:  Ground   pudding thick liquid, mechanical soft consistency food. Medications should be taken crushed with puree. May have water and Ice between meals after oral care, under staff or family supervision and with the recommended strategies for safe swallowing.  Recommended Strategies: Upright for PO and small bites and sips. Oral care before breakfast, after all meals and PRN             Discharge References/Attachments     NAUSEA, ADULT, EASY-TO-READ (ENGLISH)    UPPER RESPIRATORY INFECTION, ADULT, EASY-TO-READ (ENGLISH)    DOXYCYCLINE TABLETS OR CAPSULES (ENGLISH)    METHYLPREDNISOLONE TABLETS (ENGLISH)    ASPIRATION PRECAUTIONS (ENGLISH)       Follow-ups for After Discharge        Follow-up Information     Follow up with Brodie Pollard MD Follow up on 1/24/2017.    Specialty:  Family Medicine    Why:  @[id number]    Contact  information:    [address] [place] [address]  [phone]        [facility] Signup     [facility] allows you to send messages to your doctor, view your test results, renew your prescriptions, schedule appointments, and more. To sign up, go to [employer] and click on the Sign Up Now link in the New User? box. Enter your [facility] Activation Code exactly as it appears below along with the last four digits of your Social Security Number and your Date of Birth () to complete the sign-up process. If you do not sign up before the expiration date, you must request a new code.    [facility] Activation Code: [email or web address]  Expires: 2017  3:01 PM    If you have questions, you can email [email or web address] or call [phone] to talk to our [facility] staff. Remember, [facility] is NOT to be used for urgent needs. For medical emergencies, dial 911.           Summary of Your Hospitalization        Reason for Hospitalization     Your primary diagnosis was:  Not on File    Your diagnoses also included:  Aspiration Into Airway, High Blood Pressure, Movement Disorder      Care Providers     Provider Service Role Specialty    Jose Manuel Joy DO -- Attending Provider Hospitalist    Camilla Jones MD Neurology Consulting Physician  Neurology       Your Allergies  Date Reviewed: 2017    No active allergies      Pending Labs     Order Current Status    CHRIST[facility] Comprehensive Panel In process      Patient Belongings Returned     Document Return of Belongings Flowsheet     Were the patient bedside belongings sent home?   Yes   Belongings Retrieved from Security & Sent Home   N/A    Belongings Sent to Safe   --   Medications Retrieved from Pharmacy & Sent Home   N/A              More Information      Nausea, Adult  Nausea means you feel sick to your stomach or need to throw up (vomit). It may be a sign of a more serious problem. If nausea gets worse, you may throw up. If you throw up a  "lot, you may lose too much body fluid (dehydration).  HOME CARE   · Get plenty of rest.  · Ask your doctor how to replace body fluid losses (rehydrate).  · Eat small amounts of food. Sip liquids more often.  · Take all medicines as told by your doctor.  GET HELP RIGHT AWAY IF:  · You have a fever.  · You pass out (faint).  · You keep throwing up or have blood in your throw up.  · You are very weak, have dry lips or a dry mouth, or you are very thirsty (dehydrated).  · You have dark or bloody poop (stool).  · You have very bad chest or belly (abdominal) pain.  · You do not get better after 2 days, or you get worse.  · You have a headache.  MAKE SURE YOU:  · Understand these instructions.  · Will watch your condition.  · Will get help right away if you are not doing well or get worse.     This information is not intended to replace advice given to you by your health care provider. Make sure you discuss any questions you have with your health care provider.     Document Released: 12/06/2012 Document Revised: 03/11/2013 Document Reviewed: 12/06/2012  [employer] Interactive Patient Education ©2016 [employer]          Upper Respiratory Infection, Adult  Most upper respiratory infections (URIs) are caused by a virus. A URI affects the nose, throat, and upper air passages. The most common type of URI is often called \"the common cold.\"  HOME CARE   · Take medicines only as told by your doctor.  · Gargle warm saltwater or take cough drops to comfort your throat as told by your doctor.  · Use a warm mist humidifier or inhale steam from a shower to increase air moisture. This may make it easier to breathe.  · Drink enough fluid to keep your pee (urine) clear or pale yellow.  · Eat soups and other clear broths.  · Have a healthy diet.  · Rest as needed.  · Go back to work when your fever is gone or your doctor says it is okay.  ¨ You may need to stay home longer to avoid giving your URI to others.  ¨ You can also wear a face " mask and wash your hands often to prevent spread of the virus.  · Use your inhaler more if you have asthma.  · Do not use any tobacco products, including cigarettes, chewing tobacco, or electronic cigarettes. If you need help quitting, ask your doctor.  GET HELP IF:  · You are getting worse, not better.  · Your symptoms are not helped by medicine.  · You have chills.  · You are getting more short of breath.  · You have brown or red mucus.  · You have yellow or brown discharge from your nose.  · You have pain in your face, especially when you bend forward.  · You have a fever.  · You have puffy (swollen) neck glands.  · You have pain while swallowing.  · You have white areas in the back of your throat.  GET HELP RIGHT AWAY IF:   · You have very bad or constant:    Headache.    Ear pain.    Pain in your forehead, behind your eyes, and over your cheekbones (sinus pain).    Chest pain.  · You have long-lasting (chronic) lung disease and any of the following:    Wheezing.    Long-lasting cough.    Coughing up blood.    A change in your usual mucus.  · You have a stiff neck.  · You have changes in your:    Vision.    Hearing.    Thinking.    Mood.  MAKE SURE YOU:   · Understand these instructions.  · Will watch your condition.  · Will get help right away if you are not doing well or get worse.     This information is not intended to replace advice given to you by your health care provider. Make sure you discuss any questions you have with your health care provider.     Document Released: 06/05/2009 Document Revised: 05/03/2016 Document Reviewed: 03/25/2015  [employer] Interactive Patient Education ©2016 [employer].          Doxycycline tablets or capsules  What is this medicine?  DOXYCYCLINE (dox seymour mckeon) is a tetracycline antibiotic. It kills certain bacteria or stops their growth. It is used to treat many kinds of infections, like dental, skin, respiratory, and urinary tract infections. It also treats acne, Lyme  disease, malaria, and certain sexually transmitted infections.  This medicine may be used for other purposes; ask your health care provider or pharmacist if you have questions.  What should I tell my health care provider before I take this medicine?  They need to know if you have any of these conditions:  -liver disease  -long exposure to sunlight like working outdoors  -stomach problems like colitis  -an unusual or allergic reaction to doxycycline, tetracycline antibiotics, other medicines, foods, dyes, or preservatives  -pregnant or trying to get pregnant  -breast-feeding  How should I use this medicine?  Take this medicine by mouth with a full glass of water. Follow the directions on the prescription label. It is best to take this medicine without food, but if it upsets your stomach take it with food. Take your medicine at regular intervals. Do not take your medicine more often than directed. Take all of your medicine as directed even if you think you are better. Do not skip doses or stop your medicine early.  Talk to your pediatrician regarding the use of this medicine in children. While this drug may be prescribed for selected conditions, precautions do apply.  Overdosage: If you think you have taken too much of this medicine contact a poison control center or emergency room at once.  NOTE: This medicine is only for you. Do not share this medicine with others.  What if I miss a dose?  If you miss a dose, take it as soon as you can. If it is almost time for your next dose, take only that dose. Do not take double or extra doses.  What may interact with this medicine?  -antacids  -barbiturates  -birth control pills  -bismuth subsalicylate  -carbamazepine  -methoxyflurane  -other antibiotics  -phenytoin  -vitamins that contain iron  -warfarin  This list may not describe all possible interactions. Give your health care provider a list of all the medicines, herbs, non-prescription drugs, or dietary supplements you  use. Also tell them if you smoke, drink alcohol, or use illegal drugs. Some items may interact with your medicine.  What should I watch for while using this medicine?  Tell your doctor or health care professional if your symptoms do not improve.  Do not treat diarrhea with over the counter products. Contact your doctor if you have diarrhea that lasts more than 2 days or if it is severe and watery.  Do not take this medicine just before going to bed. It may not dissolve properly when you lay down and can cause pain in your throat. Drink plenty of fluids while taking this medicine to also help reduce irritation in your throat.  This medicine can make you more sensitive to the sun. Keep out of the sun. If you cannot avoid being in the sun, wear protective clothing and use sunscreen. Do not use sun lamps or tanning beds/booths.  Birth control pills may not work properly while you are taking this medicine. Talk to your doctor about using an extra method of birth control.  If you are being treated for a sexually transmitted infection, avoid sexual contact until you have finished your treatment. Your sexual partner may also need treatment.  Avoid antacids, aluminum, calcium, magnesium, and iron products for 4 hours before and 2 hours after taking a dose of this medicine.  If you are using this medicine to prevent malaria, you should still protect yourself from contact with mosquitos. Stay in screened-in areas, use mosquito nets, keep your body covered, and use an insect repellent.  What side effects may I notice from receiving this medicine?  Side effects that you should report to your doctor or health care professional as soon as possible:  -allergic reactions like skin rash, itching or hives, swelling of the face, lips, or tongue  -difficulty breathing  -fever  -itching in the rectal or genital area  -pain on swallowing  -redness, blistering, peeling or loosening of the skin, including inside the mouth  -severe stomach  pain or cramps  -unusual bleeding or bruising  -unusually weak or tired  -yellowing of the eyes or skin  Side effects that usually do not require medical attention (report to your doctor or health care professional if they continue or are bothersome):  -diarrhea  -loss of appetite  -nausea, vomiting  This list may not describe all possible side effects. Call your doctor for medical advice about side effects. You may report side effects to FDA at 7-542-KSZ-5369.  Where should I keep my medicine?  Keep out of the reach of children.  Store at room temperature, below 30 degrees C (86 degrees F). Protect from light. Keep container tightly closed. Throw away any unused medicine after the expiration date. Taking this medicine after the expiration date can make you seriously ill.  NOTE: This sheet is a summary. It may not cover all possible information. If you have questions about this medicine, talk to your doctor, pharmacist, or health care provider.     © 2016, Elsevier[employer]. (2016-04-08 12:10:28)          Methylprednisolone tablets  What is this medicine?  METHYLPREDNISOLONE (meth ill pred NISS oh lone) is a corticosteroid. It is commonly used to treat inflammation of the skin, joints, lungs, and other organs. Common conditions treated include asthma, allergies, and arthritis. It is also used for other conditions, such as blood disorders and diseases of the adrenal glands.  This medicine may be used for other purposes; ask your health care provider or pharmacist if you have questions.  What should I tell my health care provider before I take this medicine?  They need to know if you have any of these conditions:  -Cushing's syndrome  -diabetes  -glaucoma  -heart problems or disease  -high blood pressure  -infection such as herpes, measles, tuberculosis, or chickenpox  -kidney disease  -liver disease  -mental problems  -myasthenia gravis  -osteoporosis  -seizures  -stomach ulcer or intestine disease including  colitis and diverticulitis  -thyroid problem  -an unusual or allergic reaction to lactose, methylprednisolone, other medicines, foods, dyes, or preservatives  -pregnant or trying to get pregnant  -breast-feeding  How should I use this medicine?  Take this medicine by mouth with a drink of water. Follow the directions on the prescription label. Take it with food or milk to avoid stomach upset. If you are taking this medicine once a day, take it in the morning. Do not take more medicine than you are told to take. Do not suddenly stop taking your medicine because you may develop a severe reaction. Your doctor will tell you how much medicine to take. If your doctor wants you to stop the medicine, the dose may be slowly lowered over time to avoid any side effects.  Talk to your pediatrician regarding the use of this medicine in children. Special care may be needed.  Overdosage: If you think you have taken too much of this medicine contact a poison control center or emergency room at once.  NOTE: This medicine is only for you. Do not share this medicine with others.  What if I miss a dose?  If you miss a dose, take it as soon as you can. If it is almost time for your next dose, talk to your doctor or health care professional. You may need to miss a dose or take an extra dose. Do not take double or extra doses without advice.  What may interact with this medicine?  Do not take this medicine with any of the following medications:  -mifepristone  This medicine may also interact with the following medications:  -tacrolimus  -vaccines  -warfarin  This list may not describe all possible interactions. Give your health care provider a list of all the medicines, herbs, non-prescription drugs, or dietary supplements you use. Also tell them if you smoke, drink alcohol, or use illegal drugs. Some items may interact with your medicine.  What should I watch for while using this medicine?  Visit your doctor or health care professional  for regular checks on your progress. If you are taking this medicine for a long time, carry an identification card with your name and address, the type and dose of your medicine, and your doctor's name and address.  The medicine may increase your risk of getting an infection. Stay away from people who are sick. Tell your doctor or health care professional if you are around anyone with measles or chickenpox.  If you are going to have surgery, tell your doctor or health care professional that you have taken this medicine within the last twelve months.  Ask your doctor or health care professional about your diet. You may need to lower the amount of salt you eat.  The medicine can increase your blood sugar. If you are a diabetic check with your doctor if you need help adjusting the dose of your diabetic medicine.  What side effects may I notice from receiving this medicine?  Side effects that you should report to your doctor or health care professional as soon as possible:  -allergic reactions like skin rash, itching or hives, swelling of the face, lips, or tongue  -eye pain, decreased or blurred vision, or bulging eyes  -fever, sore throat, sneezing, cough, or other signs of infection, wounds that will not heal  -increased thirst  -mental depression, mood swings, mistaken feelings of self importance or of being mistreated  -pain in hips, back, ribs, arms, shoulders, or legs  -swelling of the ankles, feet, hands  -trouble passing urine or change in the amount of urine  Side effects that usually do not require medical attention (report to your doctor or health care professional if they continue or are bothersome):  -confusion, excitement, restlessness  -headache  -nausea, vomiting  -skin problems, acne, thin and shiny skin  -weight gain  This list may not describe all possible side effects. Call your doctor for medical advice about side effects. You may report side effects to FDA at 0-267-FDA-0494.  Where should I keep  my medicine?  Keep out of the reach of children.  Store at room temperature between 20 and 25 degrees C (68 and 77 degrees F). Throw away any unused medicine after the expiration date.  NOTE: This sheet is a summary. It may not cover all possible information. If you have questions about this medicine, talk to your doctor, pharmacist, or health care provider.     © 2016, [employer]. (2013-09-17 11:38:34)          Aspiration Precautions  Aspiration is the breathing in (inhalation) of a liquid or object into the lungs. Things that can be inhaled into the lungs include:   · Food.  · Any type of liquid, such as drinks or saliva.  · Stomach contents, such as vomit or stomach acid.  When these things go into the lungs, damage can occur and serious complications can result, such as:  · Lung infection (pneumonia).  · Collection of infected liquid (pus) in the lungs (lung abscess).  · Death.  CAUSES  The cause of aspiration may include:   · A lowered level of awareness (consciousness) due to:    Traumatic brain injury or head injury.    Stroke.    Diseases of the nerves, brain, or spinal cord.    Seizures.  · A problem with the gag reflex. The gag reflex protects the body from swallowing things too quickly or things that are too large.  · Medical conditions that affect swallowing.  · Conditions that affect the food pipe (esophagus).  · Acid reflux. This is when stomach acid moves into the esophagus.  · Any type of surgery where a medicine to sleep (general anesthetic) or relax (sedative) is given.  · Alcohol abuse.  · Illegal drug abuse.  · Taking medicine that causes sleepiness, confusion, or weakness.  · Aging.  · Dental problems.  · Having a feeding tube.  SIGNS AND SYMPTOMS  Symptoms of aspiration may include:   · Coughing after swallowing food or liquids.  · Difficulty breathing. This may include:    Breathing quickly.    Breathing very slowly.    Loud breathing.    Rumbling sounds from the lungs while  breathing.  · Coughing up phlegm (sputum) that:    Is yellow, tan, or green.    Has pieces of food in it.    Is bad smelling.  · A change in voice so that it sounds scratchy.  · A change in skin color. The skin may look red or blue.     · Fever.  · Watery eyes.  · Pain in the chest or back.  · A pained look on the face.     · A feeling of fullness in the throat or that something is stuck in the throat.  DIAGNOSIS  Aspiration may be diagnosed by:   · Chest X-ray.  · Bronchoscopy. This is a surgical procedure in which a thin, flexible tube with a camera is inserted into the nose or mouth to the lungs. The health care provider can then view the lungs.  · A swallowing evaluation study to find out:    A person's risk of aspiration.    How difficult it is for a person to swallow.    What types of foods are safe for a person to eat.  PREVENTION  If you are caring for someone who can eat and drink through his or her mouth:   · Have the person sit in an upright position when eating food or drinking fluids, such as:    Sitting up in a chair.    If sitting in a chair is not possible, position the person in bed so he or she is upright.  · Remind the person to eat slowly and chew well.  · Do not distract the person. This is especially important for people with thinking or memory (cognitive) problems.  · Check the person's mouth for leftover food after eating.  · Keep the person sitting upright for 30-45 minutes after eating.  · Do not serve food or drink for at least 2 hours before bedtime.  If you are caring for someone with a feeding tube who cannot eat or drink through his or her mouth:  · Keep the person in an upright position as much as possible.  · Do not  lay the person flat if he or she is getting continuous feedings. Turn the feeding pump off if you need to lay the person flat for any reason.  · Check feeding tube residuals as directed by your health care provider. Ask your health care provider what residual amount is  too high.  General guidelines to prevent aspiration in someone you are caring for include:  · Feed small amounts of food. Do not force feed.  · Food should be thickened as directed by the person's speech pathologist.  · Use as little water as possible when brushing the person's teeth or cleaning his or her mouth.  · Provide oral care before and after meals.  · Never put food or liquids in the mouth of a person who is not fully alert.  · Crush pills and put them in soft food such as pudding or ice cream. Some pills should not be crushed. Check with your health care provider before crushing any medicine.  SEEK MEDICAL CARE IF:  · The person has a feeding tube and the feeding tube residual amount is too high.  · The person has a fever.  · The person tries to avoid food, such as refusing to eat or be fed, or is eating less than normal.  SEEK IMMEDIATE MEDICAL CARE IF:   · The person has trouble breathing or starts to breathe quickly.  · The person is breathing very slowly or stops breathing.  · The person coughs a lot after eating or drinking.  · The person has a long-lasting (chronic) cough.  · The person coughs up thick, yellow, or tan sputum.  MAKE SURE YOU:   · Understand these instructions.  · Will watch the person's condition.  · Will get help right away if the person is not doing well or gets worse.     This information is not intended to replace advice given to you by your health care provider. Make sure you discuss any questions you have with your health care provider.     Document Released: 01/20/2012 Document Revised: 01/08/2016 Document Reviewed: 03/25/2015  [employer] Patient Education ©2016 [employer]            SYMPTOMS OF A STROKE    Call 911 or have someone take you to the Emergency Department if you have any of the following:    · Sudden numbness or weakness of your face, arm or leg especially on one side of the body  · Sudden confusion, diffiiculty speaking or trouble understanding    · Changes in your vision or loss of sight in one eye  · Sudden severe headache with no known cause  · sudden dizziness, trouble walking, loss of balance or coordination    It is important to seek emergency care right away if you have further stroke symptoms. If you get emergency help quickly, the powerful clot-dissolving medicines can reduce the disabilities caused by a stroke.     For more information:    American Stroke Association  0-363-0-STROKE  www.strokeassociation.org           IF YOU SMOKE OR USE TOBACCO PLEASE READ THE FOLLOWING:    Why is smoking bad for me?  Smoking increases the risk of heart disease, lung disease, vascular disease, stroke, and cancer.     If you smoke, STOP!    If you would like more information on quitting smoking, please visit the [employer] website: [email or web address]-[email or web address]/smoke   This link will provide additional resources including the QUIT line and the [employer] the [employer] support groups.     For more information:    American Cancer Society  (301) 906-2740    American Heart Association  1-532.148.7543               YOU ARE THE MOST IMPORTANT FACTOR IN YOUR RECOVERY.     Follow all instructions carefully.     I have reviewed my discharge instructions with my nurse, including the following information, if applicable:     Information about my illness and diagnosis   Follow up appointments (including lab draws)   Wound Care   Equipment Needs   Medications (new and continuing) along with side effects   Preventative information such as vaccines and smoking cessations   Diet   Pain   I know when to contact my Doctor's office or seek emergency care      I want my nurse to describe the side effects of my medications: YES NO   If the answer is no, I understand the side effects of my medications: YES NO   My nurse described the side effects of my medications in a way that I could understand: YES NO   I have taken my personal belongings and my own  medications with me at discharge: YES NO            I have received this information and my questions have been answered. I have discussed any concerns I see with this plan with the nurse or physician. I understand these instructions.    Signature of Patient or Responsible Person: _____________________________________    Date: _________________  Time: __________________    Signature of Healthcare Provider: _______________________________________  Date: _________________  Time: __________________

## 2017-01-15 NOTE — ED NOTES
Pt observed sitting up in bed unresponsive and appears to be choking on his own spit, hit pt on back and chest to help him loosen the phlem in his throat to help him breath, pt is able to breath after blow to back. Pt was observed unresponsive for approx 10-15 seconds.     Lynn Trujillo RN  01/15/17 2340

## 2017-01-15 NOTE — ED NOTES
Pt sitting up in bed getting ready to be discharged, pt observed choking on his own spit and unresponsive for approx 10-15 seconds.       Lynn Trujillo RN  01/15/17 9660

## 2017-01-16 ENCOUNTER — APPOINTMENT (OUTPATIENT)
Dept: MRI IMAGING | Facility: HOSPITAL | Age: 77
End: 2017-01-16

## 2017-01-16 ENCOUNTER — APPOINTMENT (OUTPATIENT)
Dept: GENERAL RADIOLOGY | Facility: HOSPITAL | Age: 77
End: 2017-01-16

## 2017-01-16 LAB
ALT SERPL W P-5'-P-CCNC: 34 U/L (ref 0–54)
AMMONIA BLD-SCNC: <9 UMOL/L (ref 9–33)
ANION GAP SERPL CALCULATED.3IONS-SCNC: 13 MMOL/L (ref 4–13)
AST SERPL-CCNC: 36 U/L (ref 7–45)
BUN BLD-MCNC: 16 MG/DL (ref 5–21)
BUN/CREAT SERPL: 15.4 (ref 7–25)
CALCIUM SPEC-SCNC: 8.4 MG/DL (ref 8.4–10.4)
CHLORIDE SERPL-SCNC: 101 MMOL/L (ref 98–110)
CO2 SERPL-SCNC: 26 MMOL/L (ref 24–31)
CREAT BLD-MCNC: 1.04 MG/DL (ref 0.5–1.4)
DEPRECATED RDW RBC AUTO: 45.3 FL (ref 40–54)
ERYTHROCYTE [DISTWIDTH] IN BLOOD BY AUTOMATED COUNT: 14.1 % (ref 12–15)
GFR SERPL CREATININE-BSD FRML MDRD: 70 ML/MIN/1.73
GLUCOSE BLD-MCNC: 100 MG/DL (ref 70–100)
HCT VFR BLD AUTO: 40.7 % (ref 40–52)
HGB BLD-MCNC: 13.7 G/DL (ref 14–18)
LYMPHOCYTES # BLD MANUAL: 1.55 10*3/MM3 (ref 0.72–4.86)
LYMPHOCYTES NFR BLD MANUAL: 11 % (ref 15–45)
LYMPHOCYTES NFR BLD MANUAL: 32 % (ref 4–12)
MCH RBC QN AUTO: 29.7 PG (ref 28–32)
MCHC RBC AUTO-ENTMCNC: 33.7 G/DL (ref 33–36)
MCV RBC AUTO: 88.1 FL (ref 82–95)
MONOCYTES # BLD AUTO: 4.52 10*3/MM3 (ref 0.19–1.3)
NEUTROPHILS # BLD AUTO: 7.48 10*3/MM3 (ref 1.87–8.4)
NEUTROPHILS NFR BLD MANUAL: 52 % (ref 39–78)
NEUTS BAND NFR BLD MANUAL: 1 % (ref 0–10)
PLAT MORPH BLD: NORMAL
PLATELET # BLD AUTO: 118 10*3/MM3 (ref 130–400)
PMV BLD AUTO: 10.9 FL (ref 6–12)
POIKILOCYTOSIS BLD QL SMEAR: ABNORMAL
POTASSIUM BLD-SCNC: 4 MMOL/L (ref 3.5–5.3)
RBC # BLD AUTO: 4.62 10*6/MM3 (ref 4.8–5.9)
SCAN SLIDE: NORMAL
SODIUM BLD-SCNC: 140 MMOL/L (ref 135–145)
VARIANT LYMPHS NFR BLD MANUAL: 4 % (ref 0–5)
WBC MORPH BLD: NORMAL
WBC NRBC COR # BLD: 14.12 10*3/MM3 (ref 4.8–10.8)

## 2017-01-16 PROCEDURE — 86235 NUCLEAR ANTIGEN ANTIBODY: CPT | Performed by: PSYCHIATRY & NEUROLOGY

## 2017-01-16 PROCEDURE — 84460 ALANINE AMINO (ALT) (SGPT): CPT | Performed by: PSYCHIATRY & NEUROLOGY

## 2017-01-16 PROCEDURE — G8997 SWALLOW GOAL STATUS: HCPCS | Performed by: SPEECH-LANGUAGE PATHOLOGIST

## 2017-01-16 PROCEDURE — A9577 INJ MULTIHANCE: HCPCS | Performed by: INTERNAL MEDICINE

## 2017-01-16 PROCEDURE — 25010000002 ENOXAPARIN PER 10 MG: Performed by: FAMILY MEDICINE

## 2017-01-16 PROCEDURE — 82140 ASSAY OF AMMONIA: CPT | Performed by: PSYCHIATRY & NEUROLOGY

## 2017-01-16 PROCEDURE — G8979 MOBILITY GOAL STATUS: HCPCS

## 2017-01-16 PROCEDURE — G8978 MOBILITY CURRENT STATUS: HCPCS

## 2017-01-16 PROCEDURE — G8996 SWALLOW CURRENT STATUS: HCPCS | Performed by: SPEECH-LANGUAGE PATHOLOGIST

## 2017-01-16 PROCEDURE — 80048 BASIC METABOLIC PNL TOTAL CA: CPT | Performed by: FAMILY MEDICINE

## 2017-01-16 PROCEDURE — 85007 BL SMEAR W/DIFF WBC COUNT: CPT | Performed by: FAMILY MEDICINE

## 2017-01-16 PROCEDURE — 0 GADOBENATE DIMEGLUMINE 529 MG/ML SOLUTION: Performed by: INTERNAL MEDICINE

## 2017-01-16 PROCEDURE — 74230 X-RAY XM SWLNG FUNCJ C+: CPT

## 2017-01-16 PROCEDURE — 97166 OT EVAL MOD COMPLEX 45 MIN: CPT

## 2017-01-16 PROCEDURE — 85025 COMPLETE CBC W/AUTO DIFF WBC: CPT | Performed by: FAMILY MEDICINE

## 2017-01-16 PROCEDURE — 71020 HC CHEST PA AND LATERAL: CPT

## 2017-01-16 PROCEDURE — 99222 1ST HOSP IP/OBS MODERATE 55: CPT | Performed by: PSYCHIATRY & NEUROLOGY

## 2017-01-16 PROCEDURE — 86225 DNA ANTIBODY NATIVE: CPT | Performed by: PSYCHIATRY & NEUROLOGY

## 2017-01-16 PROCEDURE — 97162 PT EVAL MOD COMPLEX 30 MIN: CPT

## 2017-01-16 PROCEDURE — 70553 MRI BRAIN STEM W/O & W/DYE: CPT

## 2017-01-16 PROCEDURE — 84450 TRANSFERASE (AST) (SGOT): CPT | Performed by: PSYCHIATRY & NEUROLOGY

## 2017-01-16 PROCEDURE — G8987 SELF CARE CURRENT STATUS: HCPCS

## 2017-01-16 PROCEDURE — 92611 MOTION FLUOROSCOPY/SWALLOW: CPT | Performed by: SPEECH-LANGUAGE PATHOLOGIST

## 2017-01-16 PROCEDURE — G8988 SELF CARE GOAL STATUS: HCPCS

## 2017-01-16 PROCEDURE — 92610 EVALUATE SWALLOWING FUNCTION: CPT

## 2017-01-16 RX ADMIN — GADOBENATE DIMEGLUMINE 17 ML: 529 INJECTION, SOLUTION INTRAVENOUS at 12:30

## 2017-01-16 RX ADMIN — SODIUM CHLORIDE 125 ML/HR: 9 INJECTION, SOLUTION INTRAVENOUS at 06:47

## 2017-01-16 RX ADMIN — ENOXAPARIN SODIUM 40 MG: 40 INJECTION SUBCUTANEOUS at 21:20

## 2017-01-16 NOTE — PROGRESS NOTES
Acute Care - Physical Therapy Initial Evaluation  [facility]     Patient Name: Clay Rivas  : 1940  MRN: [id number]  Today's Date: 2017   Onset of Illness/Injury or Date of Surgery Date: 01/15/17  Date of Referral to PT: 17  Referring Physician: Dr Pham      Admit Date: 1/15/2017     Visit Dx:    ICD-10-CM ICD-9-CM   1. Aspiration into airway, initial encounter T17.908A 934.9   2. Bronchitis J40 490   3. Nausea and vomiting, intractability of vomiting not specified, unspecified vomiting type R11.2 787.01   4. Dysphagia, unspecified type R13.10 787.20   5. Impaired functional mobility, balance, gait, and endurance Z74.09 V49.89     Patient Active Problem List   Diagnosis   • Aspiration into airway   • Hypertension   • Movement disorder     Past Medical History   Diagnosis Date   • Atypical chest pain    • Hypertension    • Sciatica      Past Surgical History   Procedure Laterality Date   • Cholecystectomy     • Hernia repair     • Calcaneous open reduction internal fixation Bilateral           PT ASSESSMENT (last 72 hours)      PT Evaluation       17 0830 17 0812    Rehab Evaluation    Document Type evaluation  -TM evaluation   see MAR  -PB    Subjective Information complains of;pain   However, pt stated this has improved since 01/15/17.  -TM agree to therapy;complains of;weakness;dizziness   Simultaneous filing. User may be unaware of other data.  -PB    Patient Effort, Rehab Treatment  good  -PB    General Information    Patient Profile Review  yes  -PB    Onset of Illness/Injury or Date of Surgery Date  01/15/17  -PB    Referring Physician  Dr Pham  -PB    General Observations  asleep in bed,on 2L O2/NC, easily aroused  -PB    Pertinent History Of Current Problem  4 day history of cough and flu like symptoms, episode of aspiration and respiratory distress in ER, slurred speech, nausea  -PB    Precautions/Limitations  fall precautions  -PB    Prior Level of Function   independent:;all household mobility;community mobility;ADL's;driving;cleaning;cooking  -PB    Equipment Currently Used at Home  none  -PB    Plans/Goals Discussed With  patient;agreed upon  -PB    Risks Reviewed  patient:;LOB;nausea/vomiting;dizziness;increased discomfort  -PB    Benefits Reviewed  patient:;improve function;increase independence;increase balance;increase strength  -PB    Barriers to Rehab  medically complex  -PB    Living Environment    Lives With  alone  -PB    Living Arrangements  mobile home  -PB    Home Accessibility  tub/shower is not walk in;bed and bath are not on the first floor  -PB    Clinical Impression    Date of Referral to PT  01/16/17  -PB    PT Diagnosis  impaired gait, balance  -PB    Patient/Family Goals Statement  return home  -PB    Criteria for Skilled Therapeutic Interventions Met  yes;treatment indicated  -PB    Pathology/Pathophysiology Noted (Describe Specifically for Each System)  musculoskeletal;neuromuscular  -PB    Impairments Found (describe specific impairments)  gait, locomotion, and balance  -PB    Functional Limitations in Following Categories (Describe Specific Limitations)  self-care;home management  -PB    Disability: Inability to Perform Actions/Activities of Required Roles (describe specific disability)  community/leisure  -PB    Rehab Potential  good, to achieve stated therapy goals  -PB    Predicted Duration of Therapy Intervention (days/wks)  until D/C  -PB    Vital Signs    Pretreatment Heart Rate (beats/min)  90  -PB    Pre SpO2 (%)  96  -PB    O2 Delivery Pre Treatment  supplemental O2   2L O2/NC  -PB    Post SpO2 (%)  99  -PB    O2 Delivery Post Treatment  room air  -PB    Pre Patient Position  Supine  -PB    Post Patient Position  Sitting  -PB    Pain Assessment    Pain Assessment  0-10  -PB    Pain Score  5  -PB    Pain Type  Acute pain  -PB    Pain Location  Throat  -PB    Pain Descriptors  Sore  -PB    Pain Frequency  Constant/continuous  -PB     Cognitive Assessment/Intervention    Current Cognitive/Communication Assessment  functional  -PB    Orientation Status  oriented x 4  -PB    Follows Commands/Answers Questions  able to follow single-step instructions;100% of the time  -PB    Personal Safety  decreased awareness, need for safety;mild impairment;decreased insight to deficits;impulsive  -PB    Personal Safety Interventions  fall prevention program maintained;gait belt;nonskid shoes/slippers when out of bed;supervised activity  -PB    ROM (Range of Motion)    General ROM  no range of motion deficits identified  -PB    MMT (Manual Muscle Testing)    General MMT Assessment  no strength deficits identified  -PB    Bed Mobility, Assessment/Treatment    Bed Mobility, Assistive Device  bed rails;head of bed elevated  -PB    Bed Mobility, Scoot/Bridge, [place]  supervision required  -PB    Bed Mob, Supine to Sit, [place]  supervision required;verbal cues required  -PB    Bed Mobility, Impairments  impaired balance;coordination impaired  -PB    Transfer Assessment/Treatment    Transfers, Sit-Stand [place]  contact guard assist;verbal cues required  -PB    Transfers, Stand-Sit [place]  contact guard assist;verbal cues required  -PB    Transfer, Safety Issues  step length decreased;loses balance backward   1 LOB upon standing able to correct with CGA  -PB    Transfer, Impairments  impaired balance;coordination impaired  -PB    Gait Assessment/Treatment    Gait, [place]ke Level  contact guard assist;hand held assist;stand by assist   CGA with HHA improved to SBA  -PB    Gait, Distance (Feet)  200   stand rest 200 feet  -PB    Gait, Gait Pattern Analysis  swing-through gait  -PB    Gait, Gait Deviations  forward flexed posture;step length decreased    knee flexion during gait  -PB    Gait, Safety Issues  step length decreased;balance decreased during turns   turning balance improved during gait  -PB    Gait, Impairments  impaired  balance;coordination impaired  -PB    Motor Skills/Interventions    Motor Response Observations  ataxia  -PB    Additional Documentation  Balance Skills Training (Group);Gross Motor Coordination Training (Group)  -PB    Balance Skills Training    Sitting-Level of Assistance  Distant supervision  -PB    Sitting-Balance Support  Feet supported  -PB    Standing-Level of Assistance  Contact guard;Close supervision  -PB    Static Standing Balance Support  Left upper extremity supported  -PB    Gait Balance-Level of Assistance  Contact guard;Close supervision   progressed to close supervision  -PB    Gait Balance Support  Left upper extremity supported   progressed away from HHA  -PB    Gross Motor Coordination Training    Gross Motor Skill, Impairments Detail  slight impairment BLE during transfers and gait  -PB    Positioning and Restraints    Pre-Treatment Position  in bed  -PB    Post Treatment Position  chair  -PB    In Chair  sitting;call light within reach;encouraged to call for assist;with SLP;notified nsg  -PB      01/16/17 0800 01/15/17 2998    Rehab Evaluation    Document Type evaluation   co-eval with PT, see MAR  -CS     Subjective Information agree to therapy;complains of;pain;weakness;dizziness   sore throat Simultaneous filing. User may be unaware of other data.  -CS     General Information    Patient Profile Review yes  -CS     Onset of Illness/Injury or Date of Surgery Date 01/15/17  -CS     Referring Physician Dr. Chito Klein  -CS     General Observations Upon entering room, pt supine in bed with HOB elevated, pt on 2L O2 via NC, cont pulse ox.  -CS     Pertinent History Of Current Problem 4 day history of cough and flu like symptoms, episode of aspiration and respiratory distress in ER, slurred speech, nausea  -CS     Precautions/Limitations fall precautions;oxygen therapy device and L/min  -CS     Prior Level of Function independent:;all household mobility;community  mobility;ADL's;shopping;cooking;cleaning  -CS     Equipment Currently Used at Home none  -CS     Plans/Goals Discussed With patient;agreed upon  -CS     Risks Reviewed patient:;LOB;nausea/vomiting;dizziness;increased discomfort  -CS     Benefits Reviewed patient:;improve function;increase independence;increase strength;increase balance  -CS     Barriers to Rehab medically complex  -CS     Living Environment    Lives With alone  -CS alone  -SC    Living Arrangements mobile home  -CS mobile home  -SC    Home Accessibility tub/shower is not walk in;bed and bath on same level  -CS no concerns  -SC    Stair Railings at Home  outside, present at both sides  -SC    Type of Financial/Environmental Concern  none  -SC    Transportation Available  family or friend will provide  -SC    Vital Signs    Pretreatment Heart Rate (beats/min) 88  -CS     Pre SpO2 (%) 97  -CS     O2 Delivery Pre Treatment supplemental O2   2L  -CS     Post SpO2 (%) 99  -CS     O2 Delivery Post Treatment room air  -CS     Pain Assessment    Pain Assessment 0-10  -CS     Pain Score 5  -CS     Pain Location Throat  -CS     Pain Descriptors Sore  -CS     Pain Frequency Constant/continuous  -CS     Cognitive Assessment/Intervention    Current Cognitive/Communication Assessment functional  -CS     Orientation Status oriented x 4  -CS     Follows Commands/Answers Questions able to follow single-step instructions;100% of the time  -CS     Personal Safety decreased awareness, need for assist;decreased awareness, need for safety;mild impairment;moderate impairment;decreased insight to deficits;impulsive  -CS     Personal Safety Interventions fall prevention program maintained;gait belt;nonskid shoes/slippers when out of bed;supervised activity  -CS     ROM (Range of Motion)    General ROM no range of motion deficits identified  -CS     MMT (Manual Muscle Testing)    General MMT Assessment no strength deficits identified  -CS     General MMT Assessment Detail VINNIE  gross strength: 5/5  -CS     Bed Mobility, Assessment/Treatment    Bed Mobility, Assistive Device bed rails;head of bed elevated  -CS     Bed Mobility, Scoot/Bridge, [place] supervision required  -CS     Bed Mob, Supine to Sit, [place] supervision required;verbal cues required  -CS     Bed Mobility, Impairments impaired balance;coordination impaired;motor control impaired  -CS     Transfer Assessment/Treatment    Transfers, Bed-Chair [place] contact guard assist;verbal cues required  -CS     Transfers, Sit-Stand [place] contact guard assist;verbal cues required  -CS     Transfers, Stand-Sit [place] contact guard assist;verbal cues required  -CS     Motor Skills/Interventions    Motor Response Observations ataxia  -CS     Additional Documentation Gross Motor Coordination Training (Group);Fine Motor Coordination Training (Group);Balance Skills Training (Group)  -CS     Balance Skills Training    Sitting-Level of Assistance Distant supervision  -CS     Sitting-Balance Support Feet supported  -CS     Standing-Level of Assistance Contact guard;Close supervision  -CS     Static Standing Balance Support Left upper extremity supported  -CS     Gait Balance-Level of Assistance Contact guard  -CS     Gait Balance Support Left upper extremity supported  -CS     Fine Motor Coordination Training    Opposition Right:;Left:;impaired  -CS     Gross Motor Coordination Training    Gross Motor Skill, Impairments Detail Finger to nose testing impaired BUEs, gross BUE writhing movement noted throughout gross body movement  -CS     General Interventions    Motor Coordination Training BUE gross motor coordination  -CS     Positioning and Restraints    Pre-Treatment Position in bed  -CS     Post Treatment Position chair  -CS     In Chair sitting;encouraged to call for assist;call light within reach;notified ns  -       01/15/17 230       General Information    Equipment Currently Used at Home none  -SC        User Key  (r) = Recorded By, (t) = Taken By, (c) = Cosigned By    Initials Name Provider Type    TM Arpita Stallings, CCC-SLP Speech and Language Pathologist    AKASH Melvin, OTR/L Occupational Therapist    SC Elsa Ozuna, RN Registered Nurse    PB Ace Guy, PT DPT Physical Therapist          Physical Therapy Education     Title: PT OT SLP Therapies (Active)     Topic: Physical Therapy (Active)     Point: Mobility training (Active)    Learning Progress Summary    Learner Readiness Method Response Comment Documented by Status   Patient Acceptance E NR transfers, safety concerns  01/16/17 0812 Active                      User Key     Initials Effective Dates Name Provider Type Discipline     08/02/16 -  Ace Guy, PT DPT Physical Therapist PT                PT Recommendation and Plan  Anticipated Discharge Disposition: home with home health  Planned Therapy Interventions: balance training, gait training, bed mobility training, home exercise program, neuromuscular re-education, patient/family education, strengthening, transfer training  PT Frequency: daily, 2 times/day, per priority policy  Plan of Care Review  Plan Of Care Reviewed With: patient  Progress: progress toward functional goals as expected  Outcome Summary/Follow up Plan: PT eval complete. Pt able to get OOB with CGA with HOB elevated, stand CGA, and ambulate CGA. Pt demonstrated some ataxia during transfers and gait. pt has decreased awareness of deficits. pt would benefit from continued skilled PT to address balance and coodination impairements, impaired functional mobility, and education to improve safety awareness. Anticipate D/C to home with HH.           IP PT Goals       01/16/17 0812          Bed Mobility PT LTG    Bed Mobility PT LTG, Date Established 01/16/17  -PB      Bed Mobility PT LTG, Time to Achieve by discharge  -PB      Bed Mobility PT LTG, Activity Type all bed mobility  -PB      Bed Mobility PT LTG,  [place] Level independent  -PB      Transfer Training PT LTG    Transfer Training PT LTG, Date Established 01/16/17  -PB      Transfer Training PT LTG, Time to Achieve by discharge  -PB      Transfer Training PT LTG, Activity Type bed to chair /chair to bed;sit to stand/stand to sit  -PB      Transfer Training PT LTG, [place] Level independent  -PB      Gait Training PT LTG    Gait Training Goal PT LTG, Date Established 01/16/17  -PB      Gait Training Goal PT LTG, Time to Achieve by discharge  -PB      Gait Training Goal PT LTG, [place] Level independent  -PB      Gait Training Goal PT LTG, Distance to Achieve 500  -PB        User Key  (r) = Recorded By, (t) = Taken By, (c) = Cosigned By    Initials Name Provider Type    CARLOS Guy, PT DPT Physical Therapist                Outcome Measures       01/16/17 0812 01/16/17 0800       How much help from another person do you currently need...    Turning from your back to your side while in flat bed without using bedrails? 4  -PB      Moving from lying on back to sitting on the side of a flat bed without bedrails? 3  -PB      Moving to and from a bed to a chair (including a wheelchair)? 3  -PB      Standing up from a chair using your arms (e.g., wheelchair, bedside chair)? 3  -PB      Climbing 3-5 steps with a railing? 3  -PB      To walk in hospital room? 3  -PB      AM-PAC 6 Clicks Score 19  -PB      How much help from another is currently needed...    Putting on and taking off regular lower body clothing?  3  -CS     Bathing (including washing, rinsing, and drying)  3  -CS     Toileting (which includes using toilet bed pan or urinal)  3  -CS     Putting on and taking off regular upper body clothing  4  -CS     Taking care of personal grooming (such as brushing teeth)  4  -CS     Eating meals  4  -CS     Score  21  -CS     Functional Assessment    Outcome Measure Options AM-PAC 6 Clicks Basic Mobility (PT)  -PB AM-PAC 6 Clicks Daily Activity (OT)   -CS       User Key  (r) = Recorded By, (t) = Taken By, (c) = Cosigned By    Initials Name Provider Type    CS Velma Melvin, OTR/L Occupational Therapist    PB Ace Guy, PT DPT Physical Therapist           Time Calculation:         PT Charges       01/16/17 0812          Time Calculation    Start Time 0812  -PB      Stop Time 0840  -PB      Time Calculation (min) 28 min  -PB      PT Received On 01/16/17  -PB      PT Goal Re-Cert Due Date 01/26/17  -PB        User Key  (r) = Recorded By, (t) = Taken By, (c) = Cosigned By    Initials Name Provider Type    PB Ace Guy, PT DPT Physical Therapist          Therapy Charges for Today     Code Description Service Date Service Provider Modifiers Qty    09213284974 HC PT MOBILITY CURRENT 1/16/2017 Ace Guy, PT DPT GP, CJ 1    10041360285 HC PT MOBILITY PROJECTED 1/16/2017 Ace Guy, PT DPT GP, CI 1    [id number] HC PT EVAL MOD COMPLEXITY 2 1/16/2017 Ace Guy, PT DPT GP, KX 1          PT G-Codes  Outcome Measure Options: AM-PAC 6 Clicks Basic Mobility (PT)  Score: 19  Functional Limitation: Mobility: Walking and moving around  Mobility: Walking and Moving Around Current Status (): At least 20 percent but less than 40 percent impaired, limited or restricted  Mobility: Walking and Moving Around Goal Status (): At least 1 percent but less than 20 percent impaired, limited or restricted      Ace Guy, PT DPT  1/16/2017

## 2017-01-16 NOTE — PROGRESS NOTES
"    [facility]  INPATIENT PROGRESS NOTE    Length of Stay: 1  Date of Admission: 1/15/2017  Primary Care Physician: Brodie Pollard MD    Subjective   Chief Complaint: \"can't swallow my spit\"    HPI   Patient sitting up in chair working with speech therapy.  He is obviously having difficulty swallowing his secretions.  When asked if he is always wiggled around constantly, he states states \"I didn't know I did\".  When asked if he ambulated with difficulty he stated no however speech technician reported physical therapy told her he was quite wobbly.  He denies chest pain or shortness of breathing.  He does report her sore throat.  He states \"I have a cold\".  Patient is requesting coffee.    Review of Systems   Constitutional: Negative for activity change, appetite change, fatigue and fever.   HENT: Positive for sore throat. Negative for congestion, mouth sores, rhinorrhea, sinus pressure and trouble swallowing.    Respiratory: Negative for cough, chest tightness, shortness of breath and wheezing.    Cardiovascular: Negative for chest pain, palpitations and leg swelling.   Gastrointestinal: Negative for abdominal distention, abdominal pain, constipation, diarrhea, nausea and vomiting.        Swallow secretions   Genitourinary: Negative for difficulty urinating, dysuria and frequency.   Musculoskeletal: Negative for arthralgias and myalgias.   Neurological: Negative for dizziness, weakness and light-headedness.   Psychiatric/Behavioral: Negative for agitation and sleep disturbance. The patient is not nervous/anxious.         All pertinent negatives and positives are as above. All other systems have been reviewed and are negative unless otherwise stated.     Objective    Temp:  [97.8 °F (36.6 °C)-99.6 °F (37.6 °C)] 99.6 °F (37.6 °C)  Heart Rate:  [] 76  Resp:  [13-25] 16  BP: (128-205)/(60-98) 136/60    Physical Exam   Constitutional: He is oriented to person, " place, and time. He appears well-developed and well-nourished. No distress.   HENT:   Head: Normocephalic and atraumatic.   Eyes: Conjunctivae and EOM are normal. Pupils are equal, round, and reactive to light. No scleral icterus.   Neck: Normal range of motion. Neck supple. No JVD present. No tracheal deviation present.   Cardiovascular: Normal rate, regular rhythm, normal heart sounds and intact distal pulses.  Exam reveals no gallop.    No murmur heard.  Pulmonary/Chest: Effort normal and breath sounds normal. No respiratory distress. He has no wheezes. He has no rales.   Abdominal: Soft. Bowel sounds are normal. He exhibits no distension. There is no tenderness. There is no guarding.   Inability to swallow secretions noted   Musculoskeletal: Normal range of motion. He exhibits no edema.   Neurological: He is alert and oriented to person, place, and time.   Constant movement   Skin: Skin is warm and dry. No rash noted. He is not diaphoretic. No erythema. No pallor.   Psychiatric: He has a normal mood and affect. His behavior is normal.   Vitals reviewed.    Results Review:  Recent Results (from the past 12 hour(s))   Basic Metabolic Panel    Collection Time: 01/16/17  5:11 AM   Result Value Ref Range    Glucose 100 70 - 100 mg/dL    BUN 16 5 - 21 mg/dL    Creatinine 1.04 0.50 - 1.40 mg/dL    Sodium 140 135 - 145 mmol/L    Potassium 4.0 3.5 - 5.3 mmol/L    Chloride 101 98 - 110 mmol/L    CO2 26.0 24.0 - 31.0 mmol/L    Calcium 8.4 8.4 - 10.4 mg/dL    eGFR Non African Amer 70 >60 mL/min/1.73    BUN/Creatinine Ratio 15.4 7.0 - 25.0    Anion Gap 13.0 4.0 - 13.0 mmol/L   CBC Auto Differential    Collection Time: 01/16/17  5:11 AM   Result Value Ref Range    WBC 14.12 (H) 4.80 - 10.80 10*3/mm3    RBC 4.62 (L) 4.80 - 5.90 10*6/mm3    Hemoglobin 13.7 (L) 14.0 - 18.0 g/dL    Hematocrit 40.7 40.0 - 52.0 %    MCV 88.1 82.0 - 95.0 fL    MCH 29.7 28.0 - 32.0 pg    MCHC 33.7 33.0 - 36.0 g/dL    RDW 14.1 12.0 - 15.0 %    RDW-SD  45.3 40.0 - 54.0 fl    MPV 10.9 6.0 - 12.0 fL    Platelets 118 (L) 130 - 400 10*3/mm3   Scan Slide    Collection Time: 01/16/17  5:11 AM   Result Value Ref Range    Scan Slide     Manual Differential    Collection Time: 01/16/17  5:11 AM   Result Value Ref Range    Neutrophil % 52.0 39.0 - 78.0 %    Lymphocyte % 11.0 (L) 15.0 - 45.0 %    Monocyte % 32.0 (H) 4.0 - 12.0 %    Bands %  1.0 0.0 - 10.0 %    Atypical Lymphocyte % 4.0 0.0 - 5.0 %    Neutrophils Absolute 7.48 1.87 - 8.40 10*3/mm3    Lymphocytes Absolute 1.55 0.72 - 4.86 10*3/mm3    Monocytes Absolute 4.52 (H) 0.19 - 1.30 10*3/mm3    Poikilocytes Slight/1+ None Seen    WBC Morphology Normal Normal    Platelet Morphology Normal Normal     Radiology Data:    Imaging Results (last 24 hours)     Procedure Component Value Units Date/Time    XR Abdomen Flat & Upright [[id number]] Collected:  01/15/17 1340     Updated:  01/15/17 1343    Narrative:       ABDOMEN FLAT AND UPRIGHT 1/15/2017 1:20 PM CST     HISTORY: Nausea     COMPARISON: 01/19/2012     FINDINGS:  The lung bases are clear. There is a nonspecific bowel gas pattern with gas in both small bowel and colon. No evidence of mechanical obstruction.. No free intraperitoneal air is present. No pathologic  calcification is seen.     The osseous structures are normal in appearance.       Impression:       Nonspecific bowel gas pattern. No evidence of mechanical obstruction or pneumoperitoneum..     This report was finalized on 01/15/2017 13:41 by Dr. Otilio Garcia MD.    XR Chest 1 View [[id number]] Collected:  01/15/17 1400     Updated:  01/15/17 1403    Narrative:       HISTORY: Cough.     FINDINGS: Today's exam is compared to previous study dated 01/19/2012. There are emphysematous changes within the upper lobes. There is no evidence of acute lobar pneumonia or effusion. There is some tortuosity of the thoracic aorta. There is an old chronic right a.c. separation.       Impression:       . No active  disease.    This report was finalized on 01/15/2017 14:01 by Dr. Otilio Garcia MD.    CT Head Without Contrast [[id number]] Collected:  01/15/17 1448     Updated:  01/15/17 1453    Narrative:       CT BRAIN without contrast 1/15/2017 2:35 PM CST     HISTORY: Speech issues. Involuntary movement.     COMPARISON: 03/16/2016      DLP: 1330 mGy cm. Automated exposure control was utilized to diminish patient radiation dose.     TECHNIQUE: Serial axial tomographic images of the brain were obtained without the use of intravenous contrast.      FINDINGS:   The midline structures are nondisplaced. There is moderate cerebral and cerebellar volume loss, with an associated increase in the prominence of the ventricles and sulci. The basilar cisterns are normal in size and configuration. There is no evidence of intracranial hemorrhage or mass-effect. There is low attenuation in the periventricular white matter, consistent with chronic ischemic change. There are no abnormal extra-axial fluid collections. There is no evidence of tonsillar herniation.      The orbits are intact. The patient has undergone previous fixation for a left superior orbital rim fracture.. The visualized paranasal sinuses, mastoid air cells and middle ear cavities are clear. The visualized  osseous structures and overlying soft tissues of the skull and face are intact.        Impression:       Moderate cerebral and cerebellar volume loss with chronic microvascular disease but no evidence of acute intracranial process.     This report was finalized on 01/15/2017 14:51 by Dr. Otilio Garcia MD.          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 MRI scan of the brain  2. Neurology consultation pending  3. D/c continuous pulse ox, change O2 to PRN  4. PT/OT/ST eval/Tx  5. Daily BMP, CBC    OSCAR Marlow   01/16/17   8:36 AM    I personally evaluated and examined the patient in conjunction with OSCAR Urrutia and agree with the assessment, treatment plan, and disposition of the patient as recorded by her. My history, exam, and further recommendations are:     He complains of cough, congestion, postnasal drip, and sore throat for the last 4 days.  His sister, Drake, is present with him.  She says that she and her  have noticed that the patient has had more difficulty with swallowing and handling secretions for longer than that.  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  He tells me that he has had this \"his whole life.\"  It has never been worked up. His sister says that this has gotten more noticeable and worse over the last year or so.    On exam, his lungs are clear.  He has some erythema to his posterior oropharynx, but no tonsillar hypertrophy or exudate.    Repeat PA and lateral chest x-ray today.  Not currently on antibiotics.  Agree with neurologic consultation and MRI.  Speech therapy as well as physical and occupational therapy services to evaluate.    His sister, Drake, has left me her cell phone number for any updates.  She has to go to work.  Her telephone number is [phone].    Jose Manuel Joy,   01/16/17  9:11 AM                "

## 2017-01-16 NOTE — PLAN OF CARE
Problem: Patient Care Overview (Adult)  Goal: Plan of Care Review  Outcome: Ongoing (interventions implemented as appropriate)    01/16/17 0953   Coping/Psychosocial Response Interventions   Plan Of Care Reviewed With patient   Patient Care Overview   Progress progress toward functional goals as expected   Outcome Evaluation   Outcome Summary/Follow up Plan OT evaluation completed. Pt demonstrates need for skilled occupational therapy services due to the CGA/min A likely needed for lower body ADLs, toileting, and standing sink side for ADLs. It is expected for pt to also require min A for fine motor coordination tasks due to impaired BUE gross and fine motor coordination. Pt demonstrates a type of ataxia, writhing movements during gross body movment to get out of bed or adjust self in chair. OT will continue to work with pt to address these deficits, however it is recommended for pt to go to TCU prior to return home due to high risk for falling due to ataxia and impaired coordination as well and decreased insight into deficcit.         Problem: Inpatient Occupational Therapy  Goal: Transfer Training Goal 1 LTG- OT  Outcome: Ongoing (interventions implemented as appropriate)    01/16/17 0953   Transfer Training OT LTG   Transfer Training OT LTG, Date Established 01/16/17   Transfer Training OT LTG, Time to Achieve by discharge   Transfer Training OT LTG, Activity Type bed to chair /chair to bed;sit to stand/stand to sit;toilet   Transfer Training OT LTG, [place] Level conditional independence       Goal: Grooming Goal LTG- OT  Outcome: Ongoing (interventions implemented as appropriate)    01/16/17 0953   Grooming OT LTG   Grooming Goal OT LTG, Date Established 01/16/17   Grooming Goal OT LTG, Time to Achieve by discharge   Grooming Goal OT LTG, [place] Level conditional independence   Grooming Goal OT LTG, Position standing       Goal: Toileting Goal LTG- OT  Outcome: Ongoing (interventions implemented as  appropriate)    01/16/17 0953   Toileting OT LTG   Toileting Goal OT LTG, Date Established 01/16/17   Toileting Goal OT LTG, Time to Achieve by discharge   Toileting Goal OT LTG, [place] Level conditional independence       Goal: LB Dressing Goal LTG- OT  Outcome: Ongoing (interventions implemented as appropriate)    01/16/17 0953   LB Dressing OT LTG   LB Dressing Goal OT LTG, Date Established 01/16/17   LB Dressing Goal OT LTG, Time to Achieve by discharge   LB Dressing Goal OT LTG, [place] Level conditional independence       Goal: Coordination Goal LTG- OT  Outcome: Ongoing (interventions implemented as appropriate)    01/16/17 0953   Coordination OT LTG   Coordination OT LTG, Date Established 01/16/17   Coordination OT LTG, Time to Achieve by discharge   Coordination OT LTG, Activity Type FM task;GM task   Coordination OT LTG, [place] Level independent

## 2017-01-16 NOTE — PLAN OF CARE
Problem: Patient Care Overview (Adult)  Goal: Plan of Care Review  Outcome: Ongoing (interventions implemented as appropriate)    01/16/17 0515   Coping/Psychosocial Response Interventions   Plan Of Care Reviewed With patient   Patient Care Overview   Progress no change   Outcome Evaluation   Outcome Summary/Follow up Plan Pt reports fair appetite with difficulty swallowing. Is currently on Soft Ground PT liq diet. Ordered Magic Cup BID

## 2017-01-16 NOTE — PROGRESS NOTES
Acute Care - Occupational Therapy Initial Evaluation  [facility]     Patient Name: Clay Rivas  : 1940  MRN: [id number]  Today's Date: 2017  Onset of Illness/Injury or Date of Surgery Date: 01/15/17  Date of Referral to OT: 01/15/17  Referring Physician: Dr Pham    Admit Date: 1/15/2017       ICD-10-CM ICD-9-CM   1. Aspiration into airway, initial encounter T17.908A 934.9   2. Bronchitis J40 490   3. Nausea and vomiting, intractability of vomiting not specified, unspecified vomiting type R11.2 787.01   4. Dysphagia, unspecified type R13.10 787.20   5. Impaired functional mobility, balance, gait, and endurance Z74.09 V49.89   6. Impaired mobility and ADLs Z74.09 799.89     Patient Active Problem List   Diagnosis   • Aspiration into airway   • Hypertension   • Movement disorder     Past Medical History   Diagnosis Date   • Atypical chest pain    • Hypertension    • Sciatica      Past Surgical History   Procedure Laterality Date   • Cholecystectomy     • Hernia repair     • Calcaneous open reduction internal fixation Bilateral           OT ASSESSMENT FLOWSHEET (last 72 hours)      OT Evaluation       17 0830 17 0812 17 0800 01/15/17 2312 01/15/17 2300    Rehab Evaluation    Document Type evaluation  -TM evaluation   see MAR  -PB evaluation   co-eval with PT, see MAR  -CS      Subjective Information complains of;pain   However, pt stated this has improved since 01/15/17.  -TM agree to therapy;complains of;weakness;dizziness   Simultaneous filing. User may be unaware of other data.  -PB agree to therapy;complains of;pain;weakness;dizziness   sore throat Simultaneous filing. User may be unaware of other data.  -CS      Patient Effort, Rehab Treatment  good  -PB       General Information    Patient Profile Review  yes  -PB yes  -CS      Onset of Illness/Injury or Date of Surgery Date  01/15/17  -PB 01/15/17  -CS      Referring Physician  Dr Pham  -PB Dr. Chito Klein  -CS       General Observations  asleep in bed,on 2L O2/NC, easily aroused  -PB Upon entering room, pt supine in bed with HOB elevated, pt on 2L O2 via NC, cont pulse ox.  -CS      Pertinent History Of Current Problem  4 day history of cough and flu like symptoms, episode of aspiration and respiratory distress in ER, slurred speech, nausea  -PB 4 day history of cough and flu like symptoms, episode of aspiration and respiratory distress in ER, slurred speech, nausea  -CS      Precautions/Limitations  fall precautions  -PB fall precautions;oxygen therapy device and L/min  -CS      Prior Level of Function  independent:;all household mobility;community mobility;ADL's;driving;cleaning;cooking  -PB independent:;all household mobility;community mobility;ADL's;shopping;cooking;cleaning  -CS      Equipment Currently Used at Home  none  -PB none  -CS  none  -SC    Plans/Goals Discussed With  patient;agreed upon  -PB patient;agreed upon  -CS      Risks Reviewed  patient:;LOB;nausea/vomiting;dizziness;increased discomfort  -PB patient:;LOB;nausea/vomiting;dizziness;increased discomfort  -CS      Benefits Reviewed  patient:;improve function;increase independence;increase balance;increase strength  -PB patient:;improve function;increase independence;increase strength;increase balance  -CS      Barriers to Rehab  medically complex  -PB medically complex  -CS      Living Environment    Lives With  alone  -PB alone  -CS alone  -SC     Living Arrangements  mobile home  -PB mobile home  -CS mobile home  -SC     Home Accessibility  tub/shower is not walk in;bed and bath are not on the first floor  -PB tub/shower is not walk in;bed and bath on same level  -CS no concerns  -SC     Stair Railings at Home    outside, present at both sides  -SC     Type of Financial/Environmental Concern    none  -SC     Transportation Available    family or friend will provide  -SC     Clinical Impression    Date of Referral to OT   01/15/17  -CS      OT Diagnosis    Impaired ADLs and functional mobility  -CS      Impairments Found (describe specific impairments)   aerobic capacity/endurance;cranial and peripheral nerve integrity;ergonomics and body mechanics;gait, locomotion, and balance;motor function;joint integrity and mobility  -CS      Patient/Family Goals Statement   to go home  -CS      Criteria for Skilled Therapeutic Interventions Met   yes;treatment indicated  -CS      Rehab Potential   good, to achieve stated therapy goals  -CS      Therapy Frequency   3-5 times/wk  -CS      Predicted Duration of Therapy Intervention (days/wks)   10 days  -CS      Anticipated Equipment Needs At Discharge   --   continue to asses  -CS      Anticipated Discharge Disposition   home with home health   TCU vs  -CS      Functional Level Prior    Ambulation     0-->independent  -SC    Transferring     0-->independent  -SC    Toileting     0-->independent  -SC    Bathing     0-->independent  -SC    Dressing     0-->independent  -SC    Eating     0-->independent  -SC    Communication     0-->understands/communicates without difficulty  -SC    Swallowing     0-->swallows foods/liquids without difficulty  -SC    Vital Signs    Pretreatment Heart Rate (beats/min)  90  -PB 88  -CS      Pre SpO2 (%)  96  -PB 97  -CS      O2 Delivery Pre Treatment  supplemental O2   2L O2/NC  -PB supplemental O2   2L  -CS      Post SpO2 (%)  99  -PB 99  -CS      O2 Delivery Post Treatment  room air  -PB room air  -CS      Pre Patient Position  Supine  -PB       Post Patient Position  Sitting  -PB       Pain Assessment    Pain Assessment  0-10  -PB 0-10  -CS      Pain Score  5  -PB 5  -CS      Pain Type  Acute pain  -PB       Pain Location  Throat  -PB Throat  -CS      Pain Descriptors  Sore  -PB Sore  -CS      Pain Frequency  Constant/continuous  -PB Constant/continuous  -CS      Cognitive Assessment/Intervention    Current Cognitive/Communication Assessment  functional  -PB functional  -CS      Orientation  Status  oriented x 4  -PB oriented x 4  -CS      Follows Commands/Answers Questions  able to follow single-step instructions;100% of the time  -PB able to follow single-step instructions;100% of the time  -CS      Personal Safety  decreased awareness, need for safety;mild impairment;decreased insight to deficits;impulsive  -PB decreased awareness, need for assist;decreased awareness, need for safety;mild impairment;moderate impairment;decreased insight to deficits;impulsive  -CS      Personal Safety Interventions  fall prevention program maintained;gait belt;nonskid shoes/slippers when out of bed;supervised activity  -PB fall prevention program maintained;gait belt;nonskid shoes/slippers when out of bed;supervised activity  -CS      ROM (Range of Motion)    General ROM  no range of motion deficits identified  -PB no range of motion deficits identified  -CS      MMT (Manual Muscle Testing)    General MMT Assessment  no strength deficits identified  -PB no strength deficits identified  -CS      General MMT Assessment Detail   BUE gross strength: 5/5  -CS      Bed Mobility, Assessment/Treatment    Bed Mobility, Assistive Device  bed rails;head of bed elevated  -PB bed rails;head of bed elevated  -CS      Bed Mobility, Scoot/Bridge, [place]  supervision required  -PB supervision required  -CS      Bed Mob, Supine to Sit, [place]  supervision required;verbal cues required  -PB supervision required;verbal cues required  -CS      Bed Mobility, Impairments  impaired balance;coordination impaired  -PB impaired balance;coordination impaired;motor control impaired  -CS      Transfer Assessment/Treatment    Transfers, Bed-Chair [place]   contact guard assist;verbal cues required  -CS      Transfers, Sit-Stand [place]  contact guard assist;verbal cues required  -PB contact guard assist;verbal cues required  -CS      Transfers, Stand-Sit [place]  contact guard assist;verbal cues required  -PB contact  guard assist;verbal cues required  -CS      Transfer, Safety Issues  step length decreased;loses balance backward   1 LOB upon standing able to correct with CGA  -PB       Transfer, Impairments  impaired balance;coordination impaired  -PB       Functional Mobility    Functional Mobility- Ind. Level   contact guard assist;supervision required;verbal cues required  -CS      Functional Mobility- Safety Issues   supplemental O2;balance decreased during turns  -CS      Functional Mobility- Comment   Pt walked from room to end of hallway x3  -CS      Upper Body Bathing Assessment/Training    UB Bathing Assess/Train, Comment   Expected level of performance: mod I  -CS      Lower Body Bathing Assessment/Training    LB Bathing Assess/Train, Comment   Expected level of performance: SBA  -CS      Upper Body Dressing Assessment/Training    UB Dressing Assess/Train, Comment   Expected level of performance: mod I  -CS      Lower Body Dressing Assessment/Training    LB Dressing Assess/Train, Clothing Type   donning:  -CS      LB Dressing Assess/Train, Position   long sitting  -CS      LB Dressing Assess/Train, [place]   supervision required  -CS      LB Dressing Assess/Train, Impairments   coordination impaired;motor control impaired  -CS      Toileting Assessment/Training    Toileting Assess/Train, Comment   Expected level of performance: CGA  -CS      Grooming Assessment/Training    Grooming Assess/Train, Comment   Expected level of performance: CGA standing sink side  -CS      Motor Skills/Interventions    Motor Response Observations  ataxia  -PB ataxia  -CS      Additional Documentation  Balance Skills Training (Group);Gross Motor Coordination Training (Group)  -PB Gross Motor Coordination Training (Group);Fine Motor Coordination Training (Group);Balance Skills Training (Group)  -CS      Balance Skills Training    Sitting-Level of Assistance  Distant supervision  -PB Distant supervision  -CS      Sitting-Balance Support   Feet supported  -PB Feet supported  -CS      Standing-Level of Assistance  Contact guard;Close supervision  -PB Contact guard;Close supervision  -CS      Static Standing Balance Support  Left upper extremity supported  -PB Left upper extremity supported  -CS      Gait Balance-Level of Assistance  Contact guard;Close supervision   progressed to close supervision  -PB Contact guard  -CS      Gait Balance Support  Left upper extremity supported   progressed away from HHA  -PB Left upper extremity supported  -CS      Gross Motor Coordination Training    Gross Motor Skill, Impairments Detail  slight impairment BLE during transfers and gait  -PB Finger to nose testing impaired BUEs, gross BUE writhing movement noted throughout gross body movement  -CS      Fine Motor Coordination Training    Opposition   Right:;Left:;impaired  -CS      General Therapy Interventions    Planned Therapy Interventions   activity intolerance;ADL retraining;IADL retraining;balance training;bed mobility training;fine motor coordination training;motor coordination training;transfer training  -CS      Motor Coordination Training   BUE gross motor coordination  -CS      Positioning and Restraints    Pre-Treatment Position  in bed  -PB in bed  -CS      Post Treatment Position  chair  -PB chair  -CS      In Chair  sitting;call light within reach;encouraged to call for assist;with SLP;notified nsg  -PB sitting;encouraged to call for assist;call light within reach;notified nsg  -CS        User Key  (r) = Recorded By, (t) = Taken By, (c) = Cosigned By    Initials Name Effective Dates    TM Arpita Stallings CCC-SLP 08/02/16 -     CS Velma Melvin OTR/L 08/02/16 -     SC Elsa Ozuna RN 08/02/16 -     PB Ace Guy, PT DPT 08/02/16 -            Occupational Therapy Education     Title: PT OT SLP Therapies (Active)     Topic: Occupational Therapy (Active)     Point: ADL training (Active)    Description: Instruct learner(s) on proper safety  adaptation and remediation techniques during self care or transfers.   Instruct in proper use of assistive devices.    Learning Progress Summary    Learner Readiness Method Response Comment Documented by Status   Patient Acceptance E NR OT POC, benefits of activity, bed mobility, transfers  01/16/17 0952 Active                      User Key     Initials Effective Dates Name Provider Type Discipline     08/02/16 -  Velma Melvin, OTR/L Occupational Therapist OT                  OT Recommendation and Plan  Anticipated Discharge Disposition: home with home health (TCU vs)  Planned Therapy Interventions: activity intolerance, ADL retraining, IADL retraining, balance training, bed mobility training, fine motor coordination training, motor coordination training, transfer training  Therapy Frequency: 3-5 times/wk  Plan of Care Review  Plan Of Care Reviewed With: patient  Progress: progress toward functional goals as expected  Outcome Summary/Follow up Plan: OT evaluation completed. Pt demonstrates need for skilled occupational therapy services due to the CGA/min A likely needed for lower body ADLs, toileting, and standing sink side for ADLs.  It is expected for pt to also require min A for fine motor coordination tasks due to impaired BUE gross and fine motor coordination.  Pt demonstrates a type of ataxia, writhing movements during gross body movment to get out of bed or adjust self in chair.  OT will continue to work with pt to address these deficits, however it is recommended for pt to go to TCU prior to return home due to high risk for falling due to ataxia and impaired coordination as well and decreased insight into deficcit.          OT Goals       01/16/17 0953          Transfer Training OT LTG    Transfer Training OT LTG, Date Established 01/16/17  -CS      Transfer Training OT LTG, Time to Achieve by discharge  -CS      Transfer Training OT LTG, Activity Type bed to chair /chair to bed;sit to stand/stand to  sit;toilet  -CS      Transfer Training OT LTG, [place] Level conditional independence  -CS      Grooming OT LTG    Grooming Goal OT LTG, Date Established 01/16/17  -CS      Grooming Goal OT LTG, Time to Achieve by discharge  -CS      Grooming Goal OT LTG, [place] Level conditional independence  -CS      Grooming Goal OT LTG, Position standing  -CS      Toileting OT LTG    Toileting Goal OT LTG, Date Established 01/16/17  -CS      Toileting Goal OT LTG, Time to Achieve by discharge  -CS      Toileting Goal OT LTG, [place] Level conditional independence  -CS      LB Dressing OT LTG    LB Dressing Goal OT LTG, Date Established 01/16/17  -CS      LB Dressing Goal OT LTG, Time to Achieve by discharge  -CS      LB Dressing Goal OT LTG, [place] Level conditional independence  -CS      Coordination OT LTG    Coordination OT LTG, Date Established 01/16/17  -CS      Coordination OT LTG, Time to Achieve by discharge  -CS      Coordination OT LTG, Activity Type FM task;GM task  -CS      Coordination OT LTG, [place] Level independent  -CS        User Key  (r) = Recorded By, (t) = Taken By, (c) = Cosigned By    Initials Name Provider Type    CS Velma Melvin, OTR/L Occupational Therapist                Outcome Measures       01/16/17 0812 01/16/17 0800       How much help from another person do you currently need...    Turning from your back to your side while in flat bed without using bedrails? 4  -PB      Moving from lying on back to sitting on the side of a flat bed without bedrails? 3  -PB      Moving to and from a bed to a chair (including a wheelchair)? 3  -PB      Standing up from a chair using your arms (e.g., wheelchair, bedside chair)? 3  -PB      Climbing 3-5 steps with a railing? 3  -PB      To walk in hospital room? 3  -PB      AM-PAC 6 Clicks Score 19  -PB      How much help from another is currently needed...    Putting on and taking off regular lower body clothing?  3  -CS     Bathing  (including washing, rinsing, and drying)  3  -CS     Toileting (which includes using toilet bed pan or urinal)  3  -CS     Putting on and taking off regular upper body clothing  4  -CS     Taking care of personal grooming (such as brushing teeth)  4  -CS     Eating meals  4  -CS     Score  21  -CS     Functional Assessment    Outcome Measure Options AM-PAC 6 Clicks Basic Mobility (PT)  -PB AM-PAC 6 Clicks Daily Activity (OT)  -CS       User Key  (r) = Recorded By, (t) = Taken By, (c) = Cosigned By    Initials Name Provider Type    CS Velma Melvin OTR/L Occupational Therapist    PB Ace Guy, PT DPT Physical Therapist          Time Calculation:   OT Start Time: 0800  OT Stop Time: 0840  OT Time Calculation (min): 40 min    Therapy Charges for Today     Code Description Service Date Service Provider Modifiers Qty    [id number]  OT SELFCARE CURRENT 1/16/2017 Velma Melvin OTR/L PRITI, CJ 1    [id number] HC OT SELFCARE PROJECTED 1/16/2017 Velma Melvin OTR/L PRITI, CI 1    [id number]  OT EVAL MOD COMPLEXITY 3 1/16/2017 Velma Melvin OTR/L PRITI, KX 1          OT G-codes  OT Professional Judgement Used?: Yes  OT Functional Scales Options: AM-PAC 6 Clicks Daily Activity (OT)  Score: 21  Functional Limitation: Self care  Self Care Current Status (): At least 20 percent but less than 40 percent impaired, limited or restricted  Self Care Goal Status (): At least 1 percent but less than 20 percent impaired, limited or restricted    Velma Melvin OTR/L  1/16/2017

## 2017-01-16 NOTE — PLAN OF CARE
Problem: Patient Care Overview (Adult)  Goal: Plan of Care Review  Outcome: Ongoing (interventions implemented as appropriate)    01/16/17 0812   Coping/Psychosocial Response Interventions   Plan Of Care Reviewed With patient   Outcome Evaluation   Outcome Summary/Follow up Plan PT eval complete. Pt able to get OOB with CGA with HOB elevated, stand CGA, and ambulate CGA. Pt demonstrated some ataxia during transfers and gait. pt has decreased awareness of deficits. pt would benefit from continued skilled PT to address balance and coodination impairements, impaired functional mobility, and education to improve safety awareness. Anticipate D/C to home with HH.          Problem: Inpatient Physical Therapy  Goal: Bed Mobility Goal LTG- PT  Outcome: Ongoing (interventions implemented as appropriate)    01/16/17 0812   Bed Mobility PT LTG   Bed Mobility PT LTG, Date Established 01/16/17   Bed Mobility PT LTG, Time to Achieve by discharge   Bed Mobility PT LTG, Activity Type all bed mobility   Bed Mobility PT LTG, [place] Level independent       Goal: Transfer Training Goal 1 LTG- PT  Outcome: Ongoing (interventions implemented as appropriate)    01/16/17 0812   Transfer Training PT LTG   Transfer Training PT LTG, Date Established 01/16/17   Transfer Training PT LTG, Time to Achieve by discharge   Transfer Training PT LTG, Activity Type bed to chair /chair to bed;sit to stand/stand to sit   Transfer Training PT LTG, [place] Level independent       Goal: Gait Training Goal LTG- PT  Outcome: Ongoing (interventions implemented as appropriate)    01/16/17 0812   Gait Training PT LTG   Gait Training Goal PT LTG, Date Established 01/16/17   Gait Training Goal PT LTG, Time to Achieve by discharge   Gait Training Goal PT LTG, [place] Level independent   Gait Training Goal PT LTG, Distance to Achieve 500

## 2017-01-16 NOTE — PROGRESS NOTES
Acute Care - Speech Language Pathology   Swallow Dysphagia Study Note [facility]     Patient Name: Clay Rivas  : 1940  MRN: [id number]  Today's Date: 2017  Onset of Illness/Injury or Date of Surgery Date: 01/15/17            Admit Date: 1/15/2017  SPEECH-LANGUAGE PATHOLOGY EVALUTION - VFSS  Subjective: The patient was seen on this date for a VFSS(Videofluoroscopic Swallowing Study).  Patient was alert and cooperative.    The patient's history is significant for concerns with aspiration.  Pt has ataxic behavior, poor insight and concerns with aspiration at beside.   Objective: Risks/benefits were reviewed with the patient, and consent was obtained. The study was completed with SLP and Radiologist present. The patient was seen in lateral view(s). Textures given included thin liquid, nectar thick liquid, honey thick liquid, puree consistency, mechanical soft consistency and regular consistency.  Assessment: Difficulties were noted with thin liquid, nectar thick liquid and honey thick liquid, characterized by aspiration and risk of aspiration.  Patient displayed decreased laryngeal elevation and epiglottic inversion with all consistencies.  Patient also had poor bolus formation and difficulty with A-P movement of bolus with all consistencies.  Thin liquids resulted in premature spillage over tip of epiglottis and into laryngeal vestibule but no definite aspiration seen.  Severe pooling was seen in pyriform sinuses with thin liquids.  Moderate residue was seen with nectar thick consistency in entire pharyngeal area.  Initial trial of honey thick liquids resulted in spillage into laryngeal vestibule to the level of the vocal cords with eventually silent aspiration.  Unable to clear with cues .      Silent Thin Nectar Honey Puree Fork Mashed Mech Soft Regular Liquid Wash   Penetration  X X  X        Aspiration       X        Residue  X X X X  X X      SLP Findings: Patient presents with moderate  oropharyngeal dysphagia.   Comments: Results were discussed with patient and with RN.  Pt seems to have some poor insight as he is not aware of his dysarthric speech.  Voice is hoarse.  My understanding is patient has neuro consult.    Recommendations: Diet Textures: pudding thick liquid, mechanical soft consistency food. Medications should be taken crushed with puree. May have water and Ice between meals after oral care, under staff or family supervision and with the recommended strategies for safe swallowing.  Recommended Strategies: Upright for PO and small bites and sips. Oral care before breakfast, after all meals and PRN.  Other Recommended Evaluations: ENT consult for vocal quality if Neuro testing negative. and Speech-Language Evaluation    Dysphagia therapy is recommended.   Tabitha Rendon, MS CCC-SLP 1/16/2017 3:32 PM        Visit Dx:     ICD-10-CM ICD-9-CM   1. Aspiration into airway, initial encounter T17.908A 934.9   2. Bronchitis J40 490   3. Nausea and vomiting, intractability of vomiting not specified, unspecified vomiting type R11.2 787.01   4. Dysphagia, unspecified type R13.10 787.20   5. Impaired functional mobility, balance, gait, and endurance Z74.09 V49.89   6. Impaired mobility and ADLs Z74.09 799.89     Patient Active Problem List   Diagnosis   • Aspiration into airway   • Hypertension   • Movement disorder     Past Medical History   Diagnosis Date   • Atypical chest pain    • Hypertension    • Sciatica      Past Surgical History   Procedure Laterality Date   • Cholecystectomy     • Hernia repair     • Calcaneous open reduction internal fixation Bilateral           SWALLOW EVALUATION (last 72 hours)      Swallow Evaluation       01/16/17 1000 01/16/17 0830             Rehab Evaluation    Document Type evaluation  -KW evaluation  -TM       Subjective Information no complaints  -KW complains of;pain   However, pt stated this has improved since 01/15/17.  -TM       General Information     Patient Profile Review yes  -KW yes  -TM       Onset of Illness/Injury  01/15/17  -TM       Subjective Patient Observations  Alert, poor deficit awareness, significant hyponasality, coughing some productive with bile appearance.   -TM       Pertinent History Of Current Problem  CT Head 01/15/17 revealed moderate cerebral and cerebellar volume loss without acute infarct. CXR 01/15/17 showed no active diseases.    -TM       Current Diet Limitations NPO  -KW NPO  -TM       Precautions/Limitations, Vision WFL  -KW WNL  -TM       Precautions/Limitations, Hearing WFL  -KW WNL  -TM       Prior Level of Function- Communication functional in all spheres  -KW functional in all spheres  -TM       Prior Level of Function- Swallowing no diet consistency restrictions  -KW no diet consistency restrictions  -TM       Plans/Goals Discussed With patient  -KW patient  -TM       Barriers to Rehab cognitive status  -KW cognitive status   Decreased deficit awareness.  -TM       Clinical Impression    Patient's Goals For Discharge return to all previous roles/activities  -KW patient did not state  -TM       SLP Swallowing Diagnosis moderate dysphagia  -KW oral dysfunction;other (see comments)   R/O pharyngeal dysfunction.   -TM       Rehab Potential/Prognosis, Swallowing good, to achieve stated therapy goals  -KW good, to achieve stated therapy goals  -TM       Criteria for Skilled Therapeutic Interventions Met demonstrates skilled criteria for intervention  -KW skilled criteria for dysphagia intervention met  -TM       FCM, Swallowing 4-->Level 4  -KW        Therapy Frequency 3-5 times/wk  -KW PRN  -TM       Predicted Duration Therapy Interv (days) until discharge  -KW until discharge  -TM       Expected Duration Therapy Session (min) 15-30 minutes  -KW 15-30 minutes  -TM       SLP Diet Recommendation soft textures;ground;pudding-thick liquids  -KW NPO: unsafe for food/liquid intake  -TM       Recommended Diagnostics  VFSS (MBS)  -TM        SLP Rec. for Method of Medication Administration meds crushed in pudding/applesauce  -KW meds via alternate route  -TM       Monitor For Signs Of Aspiration cough;throat clearing  -KW cough;gurgly voice;throat clearing;pneumonia;right lower lobe infiltrates  -TM       Anticipated Discharge Disposition home with assist  -KW home with assist  -TM       Demonstrates Need for Referral to Another Service  neurology  -TM       Oral Motor Structure and Function    Dentition Assessment missing teeth  -KW missing teeth;teeth are in poor condition;other (see comments)   Pt only has two top teeth, does own dentures not used for PO  -TM       Secretion Management problems swallowing secretions;wet vocal quality  -KW problems swallowing secretions;gagging or choking on secretions  -TM       Mucosal Quality moist, healthy  -KW moist, healthy  -TM       Velar Elevation WFL (within functional limits)  -KW        Gag Response  WNL (within normal limits)  -TM       Volitional Swallow no difficulties initiating volitional swallow  -KW no difficulties initiating volitional swallow  -TM       Volitional Cough no difficulties initiating volitional cough  -KW no difficulties initiating volitional cough  -TM       Oral Musculature General Assessment lingual impairment  -KW mandibular impairment;lingual impairment  -TM       Oral Motor Assessment Comment generalized weakness and difficulty with A-P bolus movement.  -KW        Mandibular Strength and Mobility  reduced ROM;reduced strength bilaterally  -TM       Lingual Strength and Mobility  reduced ROM;reduced strength bilaterally;other (see comments)   Reduced lingual control and coordination.   -TM       General Feeding/Swallowing Observations    Current Feeding Method NPO  -KW NPO  -TM       Respiratory Support Currently in Use  nasal cannula in use  -TM       Observations of Self Feeding Skills  patient unaware of feeding safety concerns  -TM       Observations of Posture During  "Feeding  Upright in bed.   -TM       Clinical Swallow Exam    Mode of Presentation  fed by clinician;self fed;spoon;straw  -TM       Oral Preparation Concerns  cohesive solid:;excess chewing noted;increased prep time  -TM       Oral Transit Concerns  cohesive solid:;increased oral transit time  -TM       Oral Residue  residue throughout oral cavity;other (see comments)   Inconsistently throughout eval.   -TM       Oral Phase Results  impaired oral phase, signs of dysfunction present  -TM       Pharyngeal Phase Results  sign/symptoms of pharyngeal impairment;cough;multiple swallows  -TM       Summary of Clinical Exam  Bedside swallow eval completed. Full range of consistencies presented. Prior to PO, pt was noted to have frequent coughing instances with occasional mucus production, which appeared to be similar to bile in nature. Pt c/o throat pain since onset of current problem/\"cold\". Pt was noted to have a 1x delayed cough following pureed, as well as delayed mucus production post pureed. Pt was also noted to have 2x delayed cough following thin. Prolonged bolus manipulation with solid with lingual incoordination and observed fatigue with bolus manipulation. 2x cough with solid consistency. Cannot fully r/o aspiration at this time.   -TM       Videofluoroscopic Swallowing Exam    Risks/Benefits Reviewed risks/benefits explained;agreed to eval;patient  -KW        Radiologic Views Used for Examination left lateral view  -KW        VFSS    Mode of Presentation honey:;pudding:;mixed:;cohesive solid:;spoon;thin:;nectar:;straw  -KW        Oral Phase Results all consistencies:;incomplete bolus preparation  -KW        Pharyngeal Phase Physiologic Impairment all consistencies:;reduced laryngeal elevation;honey:;aspiration after from residue  -KW        Post Swallow Residue all consistencies:;unable to clear residue in valleculae;unable to clear residue pharyngeal walls;unable to clear residue in pyriform sinuses  -KW        " Response to Aspiration absent response, silent aspiration  -KW        Pharyngeal Phase Results impaired pharyngeal phase of swallowing  -KW        Summary of VFSS Poor laryngeal elevation and difficulty with A-P movement of bolus.  Aspiration of honey thick liquids is silent.  See top of report for full results.  -KW        VFSS Swallow Recommendations    Eating Assistance needs occasional supervision during self eating activity  -KW        Other Recommendations mechanical soft;pudding/spoon thick;water between meals  -KW        Swallow Recommendations    Oral Care  oral care with toothbrush and dentifrice BID and PRN  -TM       Modified Eating Strategies  upright positioning 90 degrees  -TM       Other Recommendations  water after oral care;ice chips after oral care  -TM       Recommended Diet  NPO: unsafe for food/liquid intake;other (see comments)   Nutritional recommendations pending VFSS today.  -TM       Dysphagia Treatment Objectives and Progress    Dysphagia Treatment Objectives Improve oral skills;Improve timing of pharyngeal response;Improve laryngeal elevation;Improve tongue base & pharyngeal wall squeeze  -KW        To Improve Oral Skills, patient will: Increase tongue tip elevation;Increase tongue lateralization;Increase tongue A-P movement;Increase back of tongue control;without cues;90%  -KW        Oral Skills Progress continue to adress  -KW        To improve timing of pharyngeal response, patient will: Swallow in timely way after sensory input;90%;without cues  -KW        Timing of Pharyngeal Response Progress continue to adress  -KW        To improve laryngeal elevation, patient will: Complete pitch-glide;Complete Mendelsohn maneuver;90%;without cues  -KW        Laryngeal Elevation Progress continue to adress  -KW        To improve tongue base & pharyngeal wall squeeze, patient will: Complete tongue hold swallow;Complete effortful swallow;90%;without cues  -KW        Tongue Base/Pharyngeal Wall  Squeeze Progress continue to adress  -KW          User Key  (r) = Recorded By, (t) = Taken By, (c) = Cosigned By    Initials Name Effective Dates    TM Arpita Stallings, CCC-SLP 08/02/16 -     KW Tabitha Rendon, MS [facility]-SLP 08/02/16 -         EDUCATION  The patient has been educated in the following areas:   Dysphagia (Swallowing Impairment).    SLP Recommendation and Plan  SLP Swallowing Diagnosis: moderate dysphagia  SLP Diet Recommendation: soft textures, ground, pudding-thick liquids     SLP Rec. for Method of Medication Administration: meds crushed in pudding/applesauce  Monitor For Signs Of Aspiration: cough, throat clearing  Recommended Diagnostics: VFSS ([facility])  Criteria for Skilled Therapeutic Interventions Met: demonstrates skilled criteria for intervention  Anticipated Discharge Disposition: home with assist  Rehab Potential/Prognosis, Swallowing: good, to achieve stated therapy goals  Therapy Frequency: 3-5 times/wk        Demonstrates Need for Referral to Another Service: neurology    Plan of Care Review  Plan Of Care Reviewed With: patient  Progress: improving  Outcome Summary/Follow up Plan: Silent aspiration of honey thick liquids.  Recommend mech soft with pudding thick liquis with sips of water between meals.            IP SLP Goals       01/16/17 1513 01/16/17 0941       Safely Consume Diet    Safely Consume Diet- SLP, Date Established 01/16/17  -KW 01/16/17  -TM     Safely Consume Diet- SLP, Time to Achieve by discharge  -KW by discharge  -TM     Safely Consume Diet- SLP, Activity Level Patient will improve oral skills for more efficient eating;Patient will improve timing of pharyngeal response for safer, more efficient swallow;Patient will improve laryngeal elevation for safer, more efficient swallow;Patient will improve tongue base/pharyngeal wall squeeze for safer, more efficient swallow  -KW      Safely Consume Diet- SLP, Additional Goal Pt will complete NMES paired with swallow exercises and  tolerate trials of PO with no overt s/s of aspiration.  -KW Pt will tolerate recommended and upgraded diet consistencies without overt s/s of aspiration.   -TM     Safely Consume Diet- SLP, Date Goal Reviewed 01/16/17  -KW 01/16/17  -TM     Safely Consume Diet- SLP, Outcome  goal ongoing  -TM       User Key  (r) = Recorded By, (t) = Taken By, (c) = Cosigned By    Initials Name Provider Type     Arpita Stallings CCC-SLP Speech and Language Pathologist    NICK Rendon MS CCC-SLP Speech and Language Pathologist             SLP Outcome Measures (last 72 hours)      SLP Outcome Measures       01/16/17 1500 01/16/17 0946       SLP Outcome Measures    Outcome Measure Used? Adult NOMS  -KW --  -TM     FCM Scores    FCM Chosen Swallowing  -KW      Swallowing FCM Score 4  -KW        User Key  (r) = Recorded By, (t) = Taken By, (c) = Cosigned By    Initials Name Effective Dates    TM DAVIAN Le 08/02/16 -     MS DAVIAN Dawn 08/02/16 -            Time Calculation:         Time Calculation- SLP       01/16/17 1521 01/16/17 0947       Time Calculation- SLP    SLP Start Time 1000  -KW 0830  -     SLP Stop Time 1130  -KW 0955  -     SLP Time Calculation (min) 90 min  -KW 85 min  -     SLP Received On 01/16/17  -KW 01/16/17  -     SLP Goal Re-Cert Due Date 01/26/17  -KW        User Key  (r) = Recorded By, (t) = Taken By, (c) = Cosigned By    Initials Name Provider Type     Arpita Stallings CCC-SLP Speech and Language Pathologist    NICK Rendon MS CCC-MANISH Speech and Language Pathologist          Therapy Charges for Today     Code Description Service Date Service Provider Modifiers Qty    11870476769 HC ST SWALLOWING CURRENT STATUS 1/16/2017 MS DAVIAN Malloy, CK 1    66110980756 HC ST SWALLOWING PROJECTED 1/16/2017 MS DAVIAN Malloy CK 1    [id number] HC ST MOTION FLUORO EVAL SWALLOW 6 1/16/2017 MS DAVIAN Malloy, KX 1          SLP G-Codes  SLP  NOMS Used?: Yes  Functional Limitations: Swallowing  Swallow Current Status (): At least 40 percent but less than 60 percent impaired, limited or restricted  Swallow Goal Status (): At least 40 percent but less than 60 percent impaired, limited or restricted    Tabitha Rendon MS CCC-SLP  1/16/2017

## 2017-01-16 NOTE — CONSULTS
Neurology Consult Note    Referring Provider: Dr. Joy  Reason for Consultation: Neurologic symptoms    History of present illness:    Per admit note  This 76-year-old white male patient has a four-day history of a cough with flulike symptoms. The patient is very tangential in answers to questions. Family members complaining of slurred speech and difficulty with articulation. I will in the emergency room he had an episode consistent with aspiration with respiratory distress. He appears be having difficulty handling oral secretions.     Patient reports that he walks without an assistive device.  He denies any problems with gait or balance.  Family members are not present for further history but did report to admitting physician that his speech had been changing.  The patient has long-standing involuntary movements that he reports have not changed recently.  He reports that he is the only family member with these involuntary movements.  He does have 2 children who are unaffected.       Past Medical History  Hypertension, sciatica    Past Surgical History  cholecystectomy, hernia repair, calcaneous ORIF    No Known Allergies    Social History   Patient is single and does not use tobacco or alcohol, is a former smoker      Review of Systems  A 14 point review of systems was reviewed and was negative except for upper respiratory symptoms    Vital Signs   Temp:  [97.8 °F (36.6 °C)-99.6 °F (37.6 °C)] 99.6 °F (37.6 °C)  Heart Rate:  [] 76  Resp:  [13-25] 16  BP: (128-205)/(60-98) 136/60    General Exam:  General: Thin  Head:  Decreased saliva control  HEENT:  Neck supple  CV:  Regular rate and rhythm.  Extremities:  No signs of peripheral edema  Skin:  No rashes    Neurologic Exam:  Alert, oriented to January, 2017 but not to exact day of the week or date  Oriented to hospital, not to remember  Attention is intact  Concentration slightly decreased  Recall 3/3  Mild dysarthria  Extraocular movements are intact  including vertical movements and without nystagmus  Face is symmetric  Hearing is intact to finger movement  Pupils are symmetric and equally reactive  No ptosis  Tongue movement is normal and without fasciculations, palate elevates symmetrically  Constant involuntary movements are present of BUE >LUE >trunk >face  These movements are consistent with chorea  No dystonia, no athetosis, no hemiballismus  Power is 5/5 in all extremities  Sensation is intact to light touch with decreased vibratory sensation at the knees  Standing balance is fair  Coordination is intact in the upper extremities  Mild BLE dysmetria        Results Review:  Lab Results (last 7 days)     Procedure Component Value Units Date/Time    POC Troponin, Rapid [[id number]]  (Normal) Collected:  01/15/17 1222    Specimen:  Blood Updated:  01/15/17 1235     Troponin I 0.00 ng/mL       Serial Number: [id number]    : [id number]       Comprehensive Metabolic Panel [[id number]]  (Abnormal) Collected:  01/15/17 1208    Specimen:  Blood from Arm, Right Updated:  01/15/17 1246     Glucose 105 (H) mg/dL      BUN 17 mg/dL      Creatinine 1.04 mg/dL      Sodium 139 mmol/L      Potassium 4.1 mmol/L      Chloride 100 mmol/L      CO2 26.0 mmol/L      Calcium 8.4 mg/dL      Total Protein 8.2 g/dL      Albumin 4.50 g/dL      ALT (SGPT) 36 U/L      AST (SGOT) 30 U/L      Alkaline Phosphatase 64 U/L      Total Bilirubin 0.7 mg/dL      eGFR Non African Amer 70 mL/min/1.73      Globulin 3.7 gm/dL      A/G Ratio 1.2 g/dL      BUN/Creatinine Ratio 16.3      Anion Gap 13.0 mmol/L     Narrative:       The MDRD GFR formula is only valid for adults with stable renal function between ages 18 and 70.    Amylase [[id number]]  (Abnormal) Collected:  01/15/17 1208    Specimen:  Blood from Arm, Right Updated:  01/15/17 1246     Amylase 127 (H) U/L     Lipase [[id number]]  (Normal) Collected:  01/15/17 1208    Specimen:  Blood from Arm, Right Updated:  01/15/17 1246     Lipase 121 U/L      Influenza Antigen [[id number]]  (Normal) Collected:  01/15/17 1203    Specimen:  Swab from Nasopharynx Updated:  01/15/17 1254     Influenza A Ag, EIA Negative      Influenza B Ag, EIA Negative     Narrative:         Recommend confirmation of negative results by viral culture or molecular assay.    CBC & Differential [[id number]] Collected:  01/15/17 1208    Specimen:  Blood Updated:  01/15/17 1258    Narrative:       The following orders were created for panel order CBC & Differential.  Procedure                               Abnormality         Status                     ---------                               -----------         ------                     Scan Slide[[id number]]                                        Final result               CBC Auto Differential[[id number]]         Abnormal            Final result                 Please view results for these tests on the individual orders.    CBC Auto Differential [[id number]]  (Abnormal) Collected:  01/15/17 1208    Specimen:  Blood from Arm, Right Updated:  01/15/17 1258     WBC 9.28 10*3/mm3      RBC 4.69 (L) 10*6/mm3      Hemoglobin 14.3 g/dL      Hematocrit 41.2 %      MCV 87.8 fL      MCH 30.5 pg      MCHC 34.7 g/dL      RDW 14.0 %      RDW-SD 45.0 fl      MPV 11.0 fL      Platelets 120 (L) 10*3/mm3      Neutrophil % 40.0 %      Lymphocyte % 12.8 (L) %      Monocyte % 46.1 (H) %      Eosinophil % 0.1 %      Basophil % 0.1 %      Immature Grans % 0.9 %      Neutrophils, Absolute 3.71 10*3/mm3      Lymphocytes, Absolute 1.19 10*3/mm3      Monocytes, Absolute 4.28 (H) 10*3/mm3      Eosinophils, Absolute 0.01 10*3/mm3      Basophils, Absolute 0.01 10*3/mm3      Immature Grans, Absolute 0.08 (H) 10*3/mm3     Scan Slide [[id number]] Collected:  01/15/17 1208    Specimen:  Blood from Arm, Right Updated:  01/15/17 1258     RBC Morphology Normal      WBC Morphology Normal      Platelet Estimate Decreased     Urinalysis With / Culture If Indicated [[id number]]  (Normal)  Collected:  01/15/17 1326    Specimen:  Urine from Urine, Clean Catch Updated:  01/15/17 1340     Color, UA Yellow      Appearance, UA Clear      pH, UA <=5.0      Specific Gravity, UA 1.018      Glucose, UA Negative      Ketones, UA Negative      Bilirubin, UA Negative      Blood, UA Negative      Protein, UA Negative      Leuk Esterase, UA Negative      Nitrite, UA Negative      Urobilinogen, UA 0.2 E.U./dL     Narrative:       Urine microscopic not indicated.    Light Blue Top [[id number]] Collected:  01/15/17 1208    Specimen:  Blood from Arm, Right Updated:  01/15/17 1701     Extra Tube hold for add-on       Auto resulted       Green Top (Gel) [[id number]] Collected:  01/15/17 1208    Specimen:  Blood from Arm, Right Updated:  01/15/17 1701     Extra Tube Hold for add-ons.       Auto resulted.       Lavender Top [[id number]] Collected:  01/15/17 1208    Specimen:  Blood from Arm, Right Updated:  01/15/17 1701     Extra Tube hold for add-on       Auto resulted       Red Top [[id number]] Collected:  01/15/17 1208    Specimen:  Blood from Arm, Right Updated:  01/15/17 1701     Extra Tube Hold for add-ons.       Auto resulted.       Protime-INR [[id number]]  (Normal) Collected:  01/15/17 1917    Specimen:  Blood Updated:  01/15/17 1944     Protime 14.5 Seconds      INR 1.09     Basic Metabolic Panel [[id number]]  (Abnormal) Collected:  01/15/17 1913    Specimen:  Blood Updated:  01/15/17 2007     Glucose 111 (H) mg/dL      BUN 15 mg/dL      Creatinine 1.02 mg/dL      Sodium 137 mmol/L      Potassium 4.0 mmol/L      Chloride 97 (L) mmol/L      CO2 26.0 mmol/L      Calcium 8.4 mg/dL      eGFR Non African Amer 71 mL/min/1.73      BUN/Creatinine Ratio 14.7      Anion Gap 14.0 (H) mmol/L     Narrative:       The MDRD GFR formula is only valid for adults with stable renal function between ages 18 and 70.    CBC Auto Differential [[id number]]  (Abnormal) Collected:  01/15/17 1913    Specimen:  Blood Updated:  01/15/17 2007     WBC  16.11 (H) 10*3/mm3      RBC 4.59 (L) 10*6/mm3      Hemoglobin 14.0 g/dL      Hematocrit 40.2 %      MCV 87.6 fL      MCH 30.5 pg      MCHC 34.8 g/dL      RDW 14.1 %      RDW-SD 44.8 fl      MPV 10.9 fL      Platelets 123 (L) 10*3/mm3     CBC & Differential [[id number]] Collected:  01/15/17 1913    Specimen:  Blood Updated:  01/15/17 2007    Narrative:       The following orders were created for panel order CBC & Differential.  Procedure                               Abnormality         Status                     ---------                               -----------         ------                     Manual Differential[[id number]]           Abnormal            Final result               Scan Slide[[id number]]                                        Final result               CBC Auto Differential[[id number]]         Abnormal            Final result                 Please view results for these tests on the individual orders.    Scan Slide [[id number]] Collected:  01/15/17 1913    Specimen:  Blood Updated:  01/15/17 2007     Scan Slide --       See Manual Differential Results       Manual Differential [[id number]]  (Abnormal) Collected:  01/15/17 1913    Specimen:  Blood Updated:  01/15/17 2007     Neutrophil % 55.0 %      Lymphocyte % 1.0 (L) %      Monocyte % 20.0 (H) %      Bands %  12.0 (H) %      Metamyelocyte % 0.0 %      Atypical Lymphocyte % 12.0 (H) %      Neutrophils Absolute 10.79 (H) 10*3/mm3      Lymphocytes Absolute 0.16 (L) 10*3/mm3      Monocytes Absolute 3.22 (H) 10*3/mm3      Poikilocytes Slight/1+      WBC Morphology Normal      Platelet Morphology Normal      Platelet Estimate --       SLIGHTLY DECREASED       [place] Draw [[id number]] Collected:  01/15/17 1208    Specimen:  Blood Updated:  01/15/17 2201    Narrative:       The following orders were created for panel order [place] Draw.  Procedure                               Abnormality         Status                     ---------                                -----------         ------                     Light Blue Top[[id number]]                                    Final result               Green Top (Gel)[[id number]]                                   Final result               Lavender Top[[id number]]                                      Final result               Red Top[[id number]]                                           Final result               Green Top (No Gel)[[id number]]                                                             Please view results for these tests on the individual orders.    Basic Metabolic Panel [[id number]]  (Normal) Collected:  01/16/17 0511    Specimen:  Blood Updated:  01/16/17 0549     Glucose 100 mg/dL      BUN 16 mg/dL      Creatinine 1.04 mg/dL      Sodium 140 mmol/L      Potassium 4.0 mmol/L      Chloride 101 mmol/L      CO2 26.0 mmol/L      Calcium 8.4 mg/dL      eGFR Non African Amer 70 mL/min/1.73      BUN/Creatinine Ratio 15.4      Anion Gap 13.0 mmol/L     Narrative:       The MDRD GFR formula is only valid for adults with stable renal function between ages 18 and 70.    CBC & Differential [[id number]] Collected:  01/16/17 0511    Specimen:  Blood Updated:  01/16/17 0616    Narrative:       The following orders were created for panel order CBC & Differential.  Procedure                               Abnormality         Status                     ---------                               -----------         ------                     Manual Differential[[id number]]           Abnormal            Final result               Scan Slide[[id number]]                                        Final result               CBC Auto Differential[[id number]]         Abnormal            Final result                 Please view results for these tests on the individual orders.    CBC Auto Differential [[id number]]  (Abnormal) Collected:  01/16/17 0511    Specimen:  Blood Updated:  01/16/17 0616     WBC 14.12 (H) 10*3/mm3      RBC 4.62 (L) 10*6/mm3      Hemoglobin  13.7 (L) g/dL      Hematocrit 40.7 %      MCV 88.1 fL      MCH 29.7 pg      MCHC 33.7 g/dL      RDW 14.1 %      RDW-SD 45.3 fl      MPV 10.9 fL      Platelets 118 (L) 10*3/mm3     Scan Slide [[id number]] Collected:  01/16/17 0511    Specimen:  Blood Updated:  01/16/17 0616     Scan Slide --       See Manual Differential Results       Manual Differential [[id number]]  (Abnormal) Collected:  01/16/17 0511    Specimen:  Blood Updated:  01/16/17 0616     Neutrophil % 52.0 %      Lymphocyte % 11.0 (L) %      Monocyte % 32.0 (H) %      Bands %  1.0 %      Atypical Lymphocyte % 4.0 %      Neutrophils Absolute 7.48 10*3/mm3      Lymphocytes Absolute 1.55 10*3/mm3      Monocytes Absolute 4.52 (H) 10*3/mm3      Poikilocytes Slight/1+      WBC Morphology Normal      Platelet Morphology Normal           .  Imaging Results (last 24 hours)     Procedure Component Value Units Date/Time    XR Abdomen Flat & Upright [[id number]] Collected:  01/15/17 1340     Updated:  01/15/17 1343    Narrative:       ABDOMEN FLAT AND UPRIGHT 1/15/2017 1:20 PM CST     HISTORY: Nausea     COMPARISON: 01/19/2012     FINDINGS:  The lung bases are clear. There is a nonspecific bowel gas pattern with  gas in both small bowel and colon. No evidence of mechanical  obstruction.. No free intraperitoneal air is present. No pathologic  calcification is seen.     The osseous structures are normal in appearance.       Impression:       Nonspecific bowel gas pattern. No evidence of mechanical obstruction or  pneumoperitoneum..     This report was finalized on 01/15/2017 13:41 by Dr. Otilio Garcia MD.    XR Chest 1 View [[id number]] Collected:  01/15/17 1400     Updated:  01/15/17 1403    Narrative:       HISTORY: Cough.     FINDINGS: Today's exam is compared to previous study dated 01/19/2012.  There are emphysematous changes within the upper lobes. There is no  evidence of acute lobar pneumonia or effusion. There is some tortuosity  of the thoracic aorta. There is  an old chronic right a.c. separation.       Impression:       . No active disease.  This report was finalized on 01/15/2017 14:01 by Dr. Otilio Garcia MD.    CT Head Without Contrast [[id number]] Collected:  01/15/17 1448     Updated:  01/15/17 1453    Narrative:       CT BRAIN without contrast 1/15/2017 2:35 PM CST     HISTORY: Speech issues. Involuntary movement.     COMPARISON: 03/16/2016      DLP: 1330 mGy cm. Automated exposure control was utilized to diminish  patient radiation dose.     TECHNIQUE: Serial axial tomographic images of the brain were obtained  without the use of intravenous contrast.      FINDINGS:   The midline structures are nondisplaced. There is moderate cerebral and  cerebellar volume loss, with an associated increase in the prominence of  the ventricles and sulci. The basilar cisterns are normal in size and  configuration. There is no evidence of intracranial hemorrhage or  mass-effect. There is low attenuation in the periventricular white  matter, consistent with chronic ischemic change. There are no abnormal  extra-axial fluid collections. There is no evidence of tonsillar  herniation.      The orbits are intact. The patient has undergone previous fixation for a  left superior orbital rim fracture.. The visualized paranasal sinuses,  mastoid air cells and middle ear cavities are clear. The visualized  osseous structures and overlying soft tissues of the skull and face are  intact.        Impression:       Moderate cerebral and cerebellar volume loss with chronic microvascular  disease but no evidence of acute intracranial process.        This report was finalized on 01/15/2017 14:51 by Dr. Otilio Garcia MD.    XR Chest PA & Lateral [[id number]] Collected:  01/16/17 1014     Updated:  01/16/17 1017    Narrative:       XR CHEST PA AND LATERAL- 1/16/2017 10:02 CST     HISTORY: cough; T17.908A-Unspecified foreign body in respiratory tract,  part unspecified causing other injury, initial  encounter;  J40-Bronchitis, not specified as acute or chronic; R11.2-Nausea with  vomiting, unspecified; R13.10-Dysphagia, unspecified; Z74.09-Other  reduced mobility      COMPARISON: 01/19/2012     FINDINGS:   The lungs are slightly emphysematous but clear. The cardiomediastinal  silhouette and pulmonary vascularity are unchanged. The osseous  structures and surrounding soft tissues demonstrate no acute  abnormality.       Impression:       1. Emphysematous changes without evidence of acute cardiopulmonary  process.           Electronically Signed By-Dr. Fredy Prince MD On:1/16/2017 11:15 AM  EST  This report was finalized on 01/16/2017 10:15 by Dr. Fredy Prince MD.    FL Video Swallow With Speech [[id number]]      Updated:  01/16/17 1019              Impression  Dysarthria and involuntary movements may represent a benign condition such as benign essential chorea, or could be due to a progressive neurodegenerative condition.  Differential diagnosis includes Calvin's disease, vasculitis, paraneoplastic condition, neuroacanthocytosis.  Given his age and long-standing nature of involuntary movements, a benign condition is most likely.  Harney's disease is not likely given lack of family history and spared cognition.  At most he may have mild cognitive impairment.    Plan  ammonia level, transaminases, ESR, CHRISTIANO, B12, TSH  Brain MRI  Swallow evaluation    Camilla Jones MD  01/16/17  10:53 AM

## 2017-01-16 NOTE — PLAN OF CARE
Problem: Patient Care Overview (Adult)  Goal: Plan of Care Review  Outcome: Ongoing (interventions implemented as appropriate)    01/16/17 0533   Coping/Psychosocial Response Interventions   Plan Of Care Reviewed With patient   Patient Care Overview   Progress no change   Outcome Evaluation   Outcome Summary/Follow up Plan Patient is NPO this shift. Patient has Neuro. consult, Neuro contacted yesterday has not seen patient yet. Patient VSS. Patient stable on o2 at 2NC.          Problem: Respiratory Insufficiency (Adult)  Goal: Identify Related Risk Factors and Signs and Symptoms  Outcome: Ongoing (interventions implemented as appropriate)  Goal: Acid/Base Balance  Outcome: Ongoing (interventions implemented as appropriate)  Goal: Effective Ventilation  Outcome: Ongoing (interventions implemented as appropriate)    Problem: Fall Risk (Adult)  Goal: Identify Related Risk Factors and Signs and Symptoms  Outcome: Ongoing (interventions implemented as appropriate)  Goal: Absence of Falls  Outcome: Ongoing (interventions implemented as appropriate)

## 2017-01-16 NOTE — PROGRESS NOTES
Discharge Planning Assessment  [facility]     Patient Name: Clay Rivas  MRN: [id number]  Today's Date: 1/16/2017    Admit Date: 1/15/2017          Discharge Needs Assessment       01/16/17 1440    Living Environment    Lives With alone    Living Arrangements mobile home    Provides Primary Care For no one    Quality Of Family Relationships supportive    Able to Return to Prior Living Arrangements yes    Discharge Needs Assessment    Concerns To Be Addressed no discharge needs identified    Anticipated Changes Related to Illness none    Equipment Currently Used at Home none    Equipment Needed After Discharge none    Transportation Available car    Discharge Planning Comments Spoke with pt to assess for home needs. Pt lives alone and plans same. Pt is independent and denies home needs.              Discharge Plan     None        Discharge Placement     No information found                Demographic Summary     None            Functional Status     None            Psychosocial     None            Abuse/Neglect     None            Legal     None            Substance Abuse     None            Patient Forms     None          BRIANA Wang

## 2017-01-16 NOTE — PLAN OF CARE
Problem: Patient Care Overview (Adult)  Goal: Plan of Care Review  Outcome: Ongoing (interventions implemented as appropriate)    01/16/17 1513   Coping/Psychosocial Response Interventions   Plan Of Care Reviewed With patient   Patient Care Overview   Progress improving   Outcome Evaluation   Outcome Summary/Follow up Plan Silent aspiration of honey thick liquids. Recommend mech soft with pudding thick liquis with sips of water between meals.          Problem: Inpatient SLP  Goal: Dysphagia- Patient will safely consume diet as per recommendation with no signs/symptoms of aspiration  Outcome: Revised    01/16/17 1513   Safely Consume Diet   Safely Consume Diet- SLP, Date Established 01/16/17   Safely Consume Diet- SLP, Time to Achieve by discharge   Safely Consume Diet- SLP, Activity Level Patient will improve oral skills for more efficient eating;Patient will improve timing of pharyngeal response for safer, more efficient swallow;Patient will improve laryngeal elevation for safer, more efficient swallow;Patient will improve tongue base/pharyngeal wall squeeze for safer, more efficient swallow   Safely Consume Diet- SLP, Additional Goal Pt will complete NMES paired with swallow exercises and tolerate trials of PO with no overt s/s of aspiration.   Safely Consume Diet- SLP, Date Goal Reviewed 01/16/17

## 2017-01-16 NOTE — PROGRESS NOTES
"Acute Care - Speech Language Pathology   Swallow Initial Evaluation [facility]     Patient Name: Clay Rivas  : 1940  MRN: [id number]  Today's Date: 2017  Onset of Illness/Injury or Date of Surgery Date: 01/15/17            Admit Date: 1/15/2017     SPEECH-LANGUAGE PATHOLOGY EVALUATION - SWALLOW  Subjective: The patient was seen on this date for a Clinical Swallow evaluation.  Patient was alert and cooperative.    Objective: Textures given included thin liquid, nectar thick liquid, honey thick liquid, puree consistency and regular consistency.  Assessment: Bedside swallow eval completed. Full range of consistencies presented. Prior to PO, pt was noted to have frequent coughing instances with occasional mucus production, which appeared to be similar to bile in nature. Pt c/o throat pain since onset of current problem/\"cold\". Pt was noted to have a 1x delayed cough following pureed, as well as delayed mucus production post pureed. Pt was also noted to have 2x delayed cough following thin. Prolonged bolus manipulation with solid with lingual incoordination and observed fatigue with bolus manipulation. 2x cough with solid consistency. Cannot fully r/o aspiration at this time.   SLP Findings:  Patient presents with moderate to severe oropharyngeal dysphagia, without esophageal component.   Recommendations: Diet Textures: NPO.  Medications should be taken by alternate means. May have water and ice after oral care, under staff or family supervision and with the recommended strategies for safe swallowing.   Recommended Strategies: Upright for PO. Oral care PRN.  Other Recommended Evaluations: VFSS    Dysphagia therapy is recommended. Rationale: See above. Further nutritional recommendations pending results of VFSS to be completed today. ST to follow up. Thanks!   Arpita Stallings, CCC-SLP 2017 9:51 AM      Visit Dx:     ICD-10-CM ICD-9-CM   1. Aspiration into airway, initial encounter T17.908A 934.9 "   2. Bronchitis J40 490   3. Nausea and vomiting, intractability of vomiting not specified, unspecified vomiting type R11.2 787.01   4. Dysphagia, unspecified type R13.10 787.20     Patient Active Problem List   Diagnosis   • Aspiration into airway   • Hypertension   • Movement disorder     Past Medical History   Diagnosis Date   • Atypical chest pain    • Hypertension    • Sciatica      Past Surgical History   Procedure Laterality Date   • Cholecystectomy     • Hernia repair     • Calcaneous open reduction internal fixation Bilateral           SWALLOW EVALUATION (last 72 hours)      Swallow Evaluation       01/16/17 0830                Rehab Evaluation    Document Type evaluation  -TM        Subjective Information complains of;pain   However, pt stated this has improved since 01/15/17.  -TM        General Information    Patient Profile Review yes  -TM        Onset of Illness/Injury 01/15/17  -TM        Subjective Patient Observations Alert, poor deficit awareness, significant hyponasality, coughing some productive with bile appearance.   -TM        Pertinent History Of Current Problem CT Head 01/15/17 revealed moderate cerebral and cerebellar volume loss without acute infarct. CXR 01/15/17 showed no active diseases.    -TM        Current Diet Limitations NPO  -TM        Precautions/Limitations, Vision WNL  -TM        Precautions/Limitations, Hearing WNL  -TM        Prior Level of Function- Communication functional in all spheres  -TM        Prior Level of Function- Swallowing no diet consistency restrictions  -TM        Plans/Goals Discussed With patient  -TM        Barriers to Rehab cognitive status   Decreased deficit awareness.  -        Clinical Impression    Patient's Goals For Discharge patient did not state  -TM        SLP Swallowing Diagnosis mild dysphagia;moderate dysphagia;oral dysfunction;other (see comments)   R/O pharyngeal dysfunction.   -TM        Rehab Potential/Prognosis, Swallowing good, to  achieve stated therapy goals  -TM        Criteria for Skilled Therapeutic Interventions Met skilled criteria for dysphagia intervention met  -TM        Therapy Frequency PRN  -TM        Predicted Duration Therapy Interv (days) until discharge  -TM        Expected Duration Therapy Session (min) 15-30 minutes  -TM        SLP Diet Recommendation NPO: unsafe for food/liquid intake  -TM        [place] [place]  [place]        SLP Rec. for Method of Medication Administration meds via alternate route  -TM        Monitor For Signs Of Aspiration cough;gurgly voice;throat clearing;pneumonia;right lower lobe infiltrates  -TM        Anticipated Discharge Disposition home with assist  -TM        Demonstrates Need for Referral to Another Service neurology  -TM        Oral Motor Structure and Function    Dentition Assessment missing teeth;teeth are in poor condition;other (see comments)   Pt only has two top teeth, does own dentures not used for PO  -TM        Secretion Management problems swallowing secretions;gagging or choking on secretions  -TM        Mucosal Quality moist, healthy  -TM        Gag Response WNL (within normal limits)  -TM        Volitional Swallow no difficulties initiating volitional swallow  -TM        Volitional Cough no difficulties initiating volitional cough  -TM        Oral Musculature General Assessment mandibular impairment;lingual impairment  -TM        Mandibular Strength and Mobility reduced ROM;reduced strength bilaterally  -TM        Lingual Strength and Mobility reduced ROM;reduced strength bilaterally;other (see comments)   Reduced lingual control and coordination.   -TM        General Feeding/Swallowing Observations    Current Feeding Method NPO  -TM        Respiratory Support Currently in Use nasal cannula in use  -TM        Observations of Self Feeding Skills patient unaware of feeding safety concerns  -TM        Observations of Posture During Feeding Upright in bed.   -TM      "   Clinical Swallow Exam    Mode of Presentation fed by clinician;self fed;spoon;straw  -TM        Oral Preparation Concerns cohesive solid:;excess chewing noted;increased prep time  -TM        Oral Transit Concerns cohesive solid:;increased oral transit time  -TM        Oral Residue residue throughout oral cavity;other (see comments)   Inconsistently throughout eval.   -TM        Oral Phase Results impaired oral phase, signs of dysfunction present  -TM        Pharyngeal Phase Results sign/symptoms of pharyngeal impairment;cough;multiple swallows  -TM        Summary of Clinical Exam Bedside swallow eval completed. Full range of consistencies presented. Prior to PO, pt was noted to have frequent coughing instances with occasional mucus production, which appeared to be similar to bile in nature. Pt c/o throat pain since onset of current problem/\"cold\". Pt was noted to have a 1x delayed cough following pureed, as well as delayed mucus production post pureed. Pt was also noted to have 2x delayed cough following thin. Prolonged bolus manipulation with solid with lingual incoordination and observed fatigue with bolus manipulation. 2x cough with solid consistency. Cannot fully r/o aspiration at this time.   -TM        Swallow Recommendations    Oral Care oral care with toothbrush and dentifrice BID and PRN  -TM        Modified Eating Strategies upright positioning 90 degrees  -TM        Other Recommendations water after oral care;ice chips after oral care  -TM        Recommended Diet NPO: unsafe for food/liquid intake;other (see comments)   Nutritional recommendations pending VFSS today.  -TM          User Key  (r) = Recorded By, (t) = Taken By, (c) = Cosigned By    Initials Name Effective Dates    TM Arpita Stallings CCC-SLP 08/02/16 -         EDUCATION  The patient has been educated in the following areas:   Dysphagia (Swallowing Impairment).    SLP Recommendation and Plan  SLP Swallowing Diagnosis: oral dysfunction, " other (see comments) (R/O pharyngeal dysfunction. )  SLP Diet Recommendation: NPO: unsafe for food/liquid intake     SLP Rec. for Method of Medication Administration: meds via alternate route  Monitor For Signs Of Aspiration: cough, gurgly voice, throat clearing, pneumonia, right lower lobe infiltrates  Recommended Diagnostics: VFSS ([facility] [place]  Criteria for Skilled Therapeutic Interventions Met: skilled criteria for dysphagia intervention met  Anticipated Discharge Disposition: home with assist  Rehab Potential/Prognosis, Swallowing: good, to achieve stated therapy goals  Therapy Frequency: PRN        Demonstrates Need for Referral to Another Service: neurology    Plan of Care Review  Plan Of Care Reviewed With: patient, other (see comments) (Reinforcements needed. )  Progress: no change  Outcome Summary/Follow up Plan: Bedside swallow eval completed by SLP. Concerns with upper extremity and oral motor movements appearing ataxic and with a component of dyskinesia in nature. Severe hyponasality. Coughing and mucus production at times prior to PO. Inconsistent overt s/s aspiration with PO intake, yet difficulty with lingual coordination and strength noted. ST recommends to continue NPO status, with plan for VFSS to be completed today in Radiology to r/o aspiration.           IP SLP Goals       01/16/17 0941          Safely Consume Diet    Safely Consume Diet- SLP, Date Established 01/16/17  -TM      Safely Consume Diet- SLP, Time to Achieve by discharge  -TM      Safely Consume Diet- SLP, Additional Goal Pt will tolerate recommended and upgraded diet consistencies without overt s/s of aspiration.   -TM      Safely Consume Diet- SLP, Date Goal Reviewed 01/16/17  -TM      Safely Consume Diet- SLP, Outcome goal ongoing  -TM        User Key  (r) = Recorded By, (t) = Taken By, (c) = Cosigned By    Initials Name Provider Type    TM Arpita Stallings CCC-SLP Speech and Language Pathologist             SLP Outcome Measures (last 72  hours)      SLP Outcome Measures       01/16/17 0946          SLP Outcome Measures    Outcome Measure Used? --  -TM        User Key  (r) = Recorded By, (t) = Taken By, (c) = Cosigned By    Initials Name Effective Dates     DAVIAN Le 08/02/16 -            Time Calculation:         Time Calculation- SLP       01/16/17 0947          Time Calculation- SLP    SLP Start Time 0830  -TM      SLP Stop Time 0955  -TM      SLP Time Calculation (min) 85 min  -TM      SLP Received On 01/16/17  -TM        User Key  (r) = Recorded By, (t) = Taken By, (c) = Cosigned By    Initials Name Provider Type     MONSE LeSLP Speech and Language Pathologist          Therapy Charges for Today     Code Description Service Date Service Provider Modifiers Qty    [id number]  ST EVAL ORAL PHARYNG SWALLOW 6 1/16/2017 DAVIAN Le GN 1               DAVIAN Zurita  1/16/2017

## 2017-01-16 NOTE — PLAN OF CARE
Problem: Patient Care Overview (Adult)  Goal: Plan of Care Review  Outcome: Ongoing (interventions implemented as appropriate)    01/16/17 0941   Coping/Psychosocial Response Interventions   Plan Of Care Reviewed With patient;other (see comments)  (Reinforcements needed. )   Patient Care Overview   Progress (Initial eval. )   Outcome Evaluation   Outcome Summary/Follow up Plan Bedside swallow eval completed by SLP. Concerns with upper extremity and oral motor movements appearing ataxic and with a component of dyskinesia in nature. Severe hyponasality. Coughing and mucus production at times prior to PO. Inconsistent overt s/s aspiration with PO intake, yet difficulty with lingual coordination and strength noted. ST recommends to continue NPO status, with plan for VFSS to be completed today in Radiology to r/o aspiration.          Problem: Inpatient SLP  Goal: Dysphagia- Patient will safely consume diet as per recommendation with no signs/symptoms of aspiration  Outcome: Ongoing (interventions implemented as appropriate)    01/16/17 0941   Safely Consume Diet   Safely Consume Diet- SLP, Date Established 01/16/17   Safely Consume Diet- SLP, Time to Achieve by discharge   Safely Consume Diet- SLP, Additional Goal Pt will tolerate recommended and upgraded diet consistencies without overt s/s of aspiration.    Safely Consume Diet- SLP, Date Goal Reviewed 01/16/17   Safely Consume Diet- SLP, Outcome goal ongoing

## 2017-01-17 VITALS
HEART RATE: 84 BPM | OXYGEN SATURATION: 95 % | DIASTOLIC BLOOD PRESSURE: 88 MMHG | RESPIRATION RATE: 20 BRPM | BODY MASS INDEX: 23.35 KG/M2 | HEIGHT: 71 IN | SYSTOLIC BLOOD PRESSURE: 176 MMHG | TEMPERATURE: 98 F | WEIGHT: 166.8 LBS

## 2017-01-17 LAB
ANION GAP SERPL CALCULATED.3IONS-SCNC: 13 MMOL/L (ref 4–13)
BASOPHILS # BLD AUTO: 0.01 10*3/MM3 (ref 0–0.2)
BASOPHILS NFR BLD AUTO: 0.1 % (ref 0–2)
BUN BLD-MCNC: 15 MG/DL (ref 5–21)
BUN/CREAT SERPL: 15.8 (ref 7–25)
CALCIUM SPEC-SCNC: 8.3 MG/DL (ref 8.4–10.4)
CENTROMERE B AB SER-ACNC: <0.2 AI (ref 0–0.9)
CHLORIDE SERPL-SCNC: 101 MMOL/L (ref 98–110)
CHROMATIN AB SERPL-ACNC: <0.2 AI (ref 0–0.9)
CO2 SERPL-SCNC: 24 MMOL/L (ref 24–31)
CREAT BLD-MCNC: 0.95 MG/DL (ref 0.5–1.4)
DEPRECATED RDW RBC AUTO: 43.5 FL (ref 40–54)
DSDNA AB SER-ACNC: 1 IU/ML (ref 0–9)
ENA JO1 AB SER-ACNC: <0.2 AI (ref 0–0.9)
ENA RNP AB SER-ACNC: 0.3 AI (ref 0–0.9)
ENA SCL70 AB SER-ACNC: <0.2 AI (ref 0–0.9)
ENA SM AB SER-ACNC: <0.2 AI (ref 0–0.9)
ENA SS-A AB SER-ACNC: <0.2 AI (ref 0–0.9)
ENA SS-B AB SER-ACNC: <0.2 AI (ref 0–0.9)
EOSINOPHIL # BLD AUTO: 0.01 10*3/MM3 (ref 0–0.7)
EOSINOPHIL NFR BLD AUTO: 0.1 % (ref 0–4)
ERYTHROCYTE [DISTWIDTH] IN BLOOD BY AUTOMATED COUNT: 13.9 % (ref 12–15)
ERYTHROCYTE [SEDIMENTATION RATE] IN BLOOD: 6 MM/HR (ref 0–15)
GFR SERPL CREATININE-BSD FRML MDRD: 77 ML/MIN/1.73
GLUCOSE BLD-MCNC: 96 MG/DL (ref 70–100)
HCT VFR BLD AUTO: 36.9 % (ref 40–52)
HGB BLD-MCNC: 12.8 G/DL (ref 14–18)
IMM GRANULOCYTES # BLD: 0.04 10*3/MM3 (ref 0–0.03)
IMM GRANULOCYTES NFR BLD: 0.4 % (ref 0–5)
LYMPHOCYTES # BLD AUTO: 1.26 10*3/MM3 (ref 0.72–4.86)
LYMPHOCYTES NFR BLD AUTO: 13.7 % (ref 15–45)
Lab: NORMAL
MCH RBC QN AUTO: 30 PG (ref 28–32)
MCHC RBC AUTO-ENTMCNC: 34.7 G/DL (ref 33–36)
MCV RBC AUTO: 86.4 FL (ref 82–95)
MONOCYTES # BLD AUTO: 4.5 10*3/MM3 (ref 0.19–1.3)
MONOCYTES NFR BLD AUTO: 48.8 % (ref 4–12)
NEUTROPHILS # BLD AUTO: 3.4 10*3/MM3 (ref 1.87–8.4)
NEUTROPHILS NFR BLD AUTO: 36.9 % (ref 39–78)
PLAT MORPH BLD: NORMAL
PLATELET # BLD AUTO: 114 10*3/MM3 (ref 130–400)
PMV BLD AUTO: 11 FL (ref 6–12)
POTASSIUM BLD-SCNC: 3.7 MMOL/L (ref 3.5–5.3)
RBC # BLD AUTO: 4.27 10*6/MM3 (ref 4.8–5.9)
RBC MORPH BLD: NORMAL
SODIUM BLD-SCNC: 138 MMOL/L (ref 135–145)
T3FREE SERPL-MCNC: 3.27 PG/ML (ref 2.77–5.27)
T4 FREE SERPL-MCNC: 1.36 NG/DL (ref 0.78–2.19)
TSH SERPL DL<=0.05 MIU/L-ACNC: 4.97 MIU/ML (ref 0.47–4.68)
VIT B12 BLD-MCNC: 458 PG/ML (ref 239–931)
WBC MORPH BLD: NORMAL
WBC NRBC COR # BLD: 9.22 10*3/MM3 (ref 4.8–10.8)

## 2017-01-17 PROCEDURE — 85007 BL SMEAR W/DIFF WBC COUNT: CPT | Performed by: FAMILY MEDICINE

## 2017-01-17 PROCEDURE — 84443 ASSAY THYROID STIM HORMONE: CPT | Performed by: PSYCHIATRY & NEUROLOGY

## 2017-01-17 PROCEDURE — 84481 FREE ASSAY (FT-3): CPT | Performed by: NURSE PRACTITIONER

## 2017-01-17 PROCEDURE — 92526 ORAL FUNCTION THERAPY: CPT | Performed by: SPEECH-LANGUAGE PATHOLOGIST

## 2017-01-17 PROCEDURE — 82607 VITAMIN B-12: CPT | Performed by: PSYCHIATRY & NEUROLOGY

## 2017-01-17 PROCEDURE — 80048 BASIC METABOLIC PNL TOTAL CA: CPT | Performed by: FAMILY MEDICINE

## 2017-01-17 PROCEDURE — 84439 ASSAY OF FREE THYROXINE: CPT | Performed by: NURSE PRACTITIONER

## 2017-01-17 PROCEDURE — 85025 COMPLETE CBC W/AUTO DIFF WBC: CPT | Performed by: FAMILY MEDICINE

## 2017-01-17 PROCEDURE — 85651 RBC SED RATE NONAUTOMATED: CPT | Performed by: PSYCHIATRY & NEUROLOGY

## 2017-01-17 RX ORDER — GUAIFENESIN, PSEUDOEPHEDRINE HYDROCHLORIDE 600; 60 MG/1; MG/1
1 TABLET, EXTENDED RELEASE ORAL EVERY 12 HOURS
Status: DISCONTINUED | OUTPATIENT
Start: 2017-01-17 | End: 2017-01-17 | Stop reason: HOSPADM

## 2017-01-17 RX ORDER — LISINOPRIL 5 MG/1
5 TABLET ORAL DAILY
Status: DISCONTINUED | OUTPATIENT
Start: 2017-01-17 | End: 2017-01-17 | Stop reason: HOSPADM

## 2017-01-17 RX ORDER — ASPIRIN 81 MG/1
81 TABLET ORAL DAILY
Status: DISCONTINUED | OUTPATIENT
Start: 2017-01-17 | End: 2017-01-17 | Stop reason: HOSPADM

## 2017-01-17 RX ORDER — DOXYCYCLINE 100 MG/1
100 CAPSULE ORAL 2 TIMES DAILY
Qty: 14 CAPSULE | Refills: 0 | Status: SHIPPED | OUTPATIENT
Start: 2017-01-17 | End: 2017-01-24

## 2017-01-17 RX ORDER — METHYLPREDNISOLONE 4 MG/1
TABLET ORAL
Qty: 21 TABLET | Refills: 0 | Status: SHIPPED | OUTPATIENT
Start: 2017-01-17

## 2017-01-17 RX ORDER — DOXYCYCLINE HYCLATE 100 MG
100 TABLET ORAL EVERY 12 HOURS SCHEDULED
Status: DISCONTINUED | OUTPATIENT
Start: 2017-01-17 | End: 2017-01-17 | Stop reason: HOSPADM

## 2017-01-17 RX ADMIN — SODIUM CHLORIDE 125 ML/HR: 9 INJECTION, SOLUTION INTRAVENOUS at 02:14

## 2017-01-17 RX ADMIN — GUAIFENESIN AND PSEUDOEPHEDRINE HYDROCHLORIDE 1 TABLET: 600; 60 TABLET, EXTENDED RELEASE ORAL at 11:17

## 2017-01-17 RX ADMIN — LISINOPRIL 5 MG: 5 TABLET ORAL at 11:17

## 2017-01-17 RX ADMIN — DOXYCYCLINE 100 MG: 100 TABLET, FILM COATED ORAL at 11:17

## 2017-01-17 RX ADMIN — ASPIRIN 81 MG: 81 TABLET ORAL at 11:17

## 2017-01-17 NOTE — THERAPY DISCHARGE NOTE
Acute Care - Physical Therapy Discharge Summary  [facility]       Patient Name: Clay Rivas  : 1940  MRN: [id number]    Today's Date: 2017  Onset of Illness/Injury or Date of Surgery Date: 01/15/17    Date of Referral to PT: 17  Referring Physician: Dr Pham      Admit Date: 1/15/2017      PT Recommendation and Plan    Visit Dx:    ICD-10-CM ICD-9-CM   1. Aspiration into airway, initial encounter T17.908A 934.9   2. Bronchitis J40 490   3. Nausea and vomiting, intractability of vomiting not specified, unspecified vomiting type R11.2 787.01   4. Dysphagia, unspecified type R13.10 787.20   5. Impaired functional mobility, balance, gait, and endurance Z74.09 V49.89   6. Impaired mobility and ADLs Z74.09 799.89             Outcome Measures       17 0812 17 0800       How much help from another person do you currently need...    Turning from your back to your side while in flat bed without using bedrails? 4  -PB      Moving from lying on back to sitting on the side of a flat bed without bedrails? 3  -PB      Moving to and from a bed to a chair (including a wheelchair)? 3  -PB      Standing up from a chair using your arms (e.g., wheelchair, bedside chair)? 3  -PB      Climbing 3-5 steps with a railing? 3  -PB      To walk in hospital room? 3  -PB      AM-PAC 6 Clicks Score 19  -PB      How much help from another is currently needed...    Putting on and taking off regular lower body clothing?  3  -CS     Bathing (including washing, rinsing, and drying)  3  -CS     Toileting (which includes using toilet bed pan or urinal)  3  -CS     Putting on and taking off regular upper body clothing  4  -CS     Taking care of personal grooming (such as brushing teeth)  4  -CS     Eating meals  4  -CS     Score  21  -CS     Functional Assessment    Outcome Measure Options AM-PAC 6 Clicks Basic Mobility (PT)  -PB AM-PAC 6 Clicks Daily Activity (OT)  -CS       User Key  (r) = Recorded By, (t) = Taken  By, (c) = Cosigned By    Initials Name Provider Type     Velma Melvin, OTR/L Occupational Therapist    PB Ace Guy, PT DPT Physical Therapist                      IP PT Goals       01/17/17 1545 01/16/17 0812       Bed Mobility PT LTG    Bed Mobility PT LTG, Date Established  01/16/17  -PB     Bed Mobility PT LTG, Time to Achieve  by discharge  -PB     Bed Mobility PT LTG, Activity Type  all bed mobility  -PB     Bed Mobility PT LTG, [place] Level  independent  -PB     Bed Mobility PT LTG, Date Goal Reviewed 01/17/17  -      Bed Mobility PT LTG, Outcome goal not met  -      Bed Mobility PT LTG, Reason Goal Not Met discharged from facility  -      Transfer Training PT LTG    Transfer Training PT LTG, Date Established  01/16/17  -PB     Transfer Training PT LTG, Time to Achieve  by discharge  -PB     Transfer Training PT LTG, Activity Type  bed to chair /chair to bed;sit to stand/stand to sit  -PB     Transfer Training PT LTG, [place] Level  independent  -PB     Transfer Training PT  LTG, Date Goal Reviewed 01/17/17  -MF      Transfer Training PT LTG, Outcome goal not met  -MF      Transfer Training PT LTG, Reason Goal Not Met discharged from facility  -      Gait Training PT LTG    Gait Training Goal PT LTG, Date Established  01/16/17  -PB     Gait Training Goal PT LTG, Time to Achieve  by discharge  -PB     Gait Training Goal PT LTG, [place] Level  independent  -PB     Gait Training Goal PT LTG, Distance to Achieve  500  -PB     Gait Training Goal PT LTG, Date Goal Reviewed 01/17/17  -      Gait Training Goal PT LTG, Outcome goal not met  -MF      Gait Training Goal PT LTG, Reason Goal Not Met discharged from facility  -        User Key  (r) = Recorded By, (t) = Taken By, (c) = Cosigned By    Initials Name Provider Type    ARELY Smith, PTA Physical Therapy Assistant    PB Ace Guy, PT DPT Physical Therapist              PT Discharge Summary  Anticipated  Discharge Disposition: home  Reason for Discharge: Discharge from facility  Outcomes Achieved: Unable to make functional progress toward goals at this time  Discharge Destination: Home      Riya Smith, PTA   1/17/2017

## 2017-01-17 NOTE — THERAPY DISCHARGE NOTE
Acute Care - Occupational Therapy Discharge Summary  [facility]     Patient Name: Clay Rivas  : 1940  MRN: [id number]    Today's Date: 2017  Onset of Illness/Injury or Date of Surgery Date: 01/15/17    Date of Referral to OT: 01/15/17  Referring Physician: Dr Pham      Admit Date: 1/15/2017        OT Recommendation and Plan    Visit Dx:    ICD-10-CM ICD-9-CM   1. Aspiration into airway, initial encounter T17.908A 934.9   2. Bronchitis J40 490   3. Nausea and vomiting, intractability of vomiting not specified, unspecified vomiting type R11.2 787.01   4. Dysphagia, unspecified type R13.10 787.20   5. Impaired functional mobility, balance, gait, and endurance Z74.09 V49.89   6. Impaired mobility and ADLs Z74.09 799.89                     OT Goals       17 1202 17 0953       Transfer Training OT LTG    Transfer Training OT LTG, Date Established  17  -CS     Transfer Training OT LTG, Time to Achieve  by discharge  -CS     Transfer Training OT LTG, Activity Type  bed to chair /chair to bed;sit to stand/stand to sit;toilet  -CS     Transfer Training OT LTG, [place] Level  conditional independence  -CS     Transfer Training OT LTG, Date Goal Reviewed 17  -TS      Transfer Training OT LTG, Outcome goal not met  -TS      Transfer Training OT LTG, Reason Goal Not Met discharged from facility  -TS      Grooming OT LTG    Grooming Goal OT LTG, Date Established  17  -CS     Grooming Goal OT LTG, Time to Achieve  by discharge  -CS     Grooming Goal OT LTG, [place] Level  conditional independence  -CS     Grooming Goal OT LTG, Position  standing  -CS     Grooming Goal OT LTG, Date Goal Reviewed 17  -TS      Grooming Goal OT LTG, Outcome goal not met  -TS      Grooming Goal OT LTG, Reason Goal Not Met discharged from facility  -TS      Toileting OT LTG    Toileting Goal OT LTG, Date Established  17  -CS     Toileting Goal OT LTG, Time to Achieve  by  discharge  -CS     Toileting Goal OT LTG, [place] Level  conditional independence  -CS     Toileting Goal OT LTG, Date Goal Reviewed 01/17/17  -TS      Toileting Goal OT LTG, Outcome goal not met  -TS      Toileting Goal OT LTG, Reason Goal Not Met discharged from facility  -TS      LB Dressing OT LTG    LB Dressing Goal OT LTG, Date Established  01/16/17  -CS     LB Dressing Goal OT LTG, Time to Achieve  by discharge  -CS     LB Dressing Goal OT LTG, [place] Level  conditional independence  -CS     LB Dressing Goal OT LTG, Date Goal Reviewed 01/17/17  -TS      LB Dressing Goal OT LTG, Outcome goal not met  -TS      LB Dressing Goal OT LTG, Reason Goal Not Met discharged from facility  -TS      Coordination OT LTG    Coordination OT LTG, Date Established  01/16/17  -CS     Coordination OT LTG, Time to Achieve  by discharge  -CS     Coordination OT LTG, Activity Type  FM task;GM task  -CS     Coordination OT LTG, [place] Level  independent  -CS     Coordination OT LTG, Date Goal Reviewed 01/17/17  -TS      Coordination OT LTG, Outcome goal not met  -TS      Coordination OT LTG, Reason Goal Not Met discharged from facility  -TS        User Key  (r) = Recorded By, (t) = Taken By, (c) = Cosigned By    Initials Name Provider Type    TS ADAM Hardwick/L Occupational Therapy Assistant    RICHARD Bird/L Occupational Therapist                Outcome Measures       01/16/17 0812 01/16/17 0800       How much help from another person do you currently need...    Turning from your back to your side while in flat bed without using bedrails? 4  -PB      Moving from lying on back to sitting on the side of a flat bed without bedrails? 3  -PB      Moving to and from a bed to a chair (including a wheelchair)? 3  -PB      Standing up from a chair using your arms (e.g., wheelchair, bedside chair)? 3  -PB      Climbing 3-5 steps with a railing? 3  -PB      To walk in hospital room? 3  -PB      AM-PAC 6  Clicks Score 19  -PB      How much help from another is currently needed...    Putting on and taking off regular lower body clothing?  3  -CS     Bathing (including washing, rinsing, and drying)  3  -CS     Toileting (which includes using toilet bed pan or urinal)  3  -CS     Putting on and taking off regular upper body clothing  4  -CS     Taking care of personal grooming (such as brushing teeth)  4  -CS     Eating meals  4  -CS     Score  21  -CS     Functional Assessment    Outcome Measure Options AM-PAC 6 Clicks Basic Mobility (PT)  -PB AM-PAC 6 Clicks Daily Activity (OT)  -CS       User Key  (r) = Recorded By, (t) = Taken By, (c) = Cosigned By    Initials Name Provider Type    CS Velma Melvin, OTR/L Occupational Therapist    CARLOS Guy, PT DPT Physical Therapist              OT Discharge Summary  Anticipated Discharge Disposition: home with home health (TCU vs)  Reason for Discharge: Discharge from facility  Outcomes Achieved: Refer to plan of care for updates on goals achieved  Discharge Destination: Home with assist      LINDA Miller  1/17/2017

## 2017-01-17 NOTE — PLAN OF CARE
Problem: Inpatient Physical Therapy  Goal: Bed Mobility Goal LTG- PT  Outcome: Unable to achieve outcome(s) by discharge Date Met:  01/17/17 01/16/17 0812 01/17/17 1545   Bed Mobility PT LTG   Bed Mobility PT LTG, Date Established 01/16/17 --    Bed Mobility PT LTG, Time to Achieve by discharge --    Bed Mobility PT LTG, Activity Type all bed mobility --    Bed Mobility PT LTG, [place] Level independent --    Bed Mobility PT LTG, Date Goal Reviewed --  01/17/17   Bed Mobility PT LTG, Outcome --  goal not met   Bed Mobility PT LTG, Reason Goal Not Met --  discharged from facility       Goal: Transfer Training Goal 1 LTG- PT  Outcome: Unable to achieve outcome(s) by discharge Date Met:  01/17/17 01/16/17 0812 01/17/17 1545   Transfer Training PT LTG   Transfer Training PT LTG, Date Established 01/16/17 --    Transfer Training PT LTG, Time to Achieve by discharge --    Transfer Training PT LTG, Activity Type bed to chair /chair to bed;sit to stand/stand to sit --    Transfer Training PT LTG, [place] Level independent --    Transfer Training PT LTG, Date Goal Reviewed --  01/17/17   Transfer Training PT LTG, Outcome --  goal not met   Transfer Training PT LTG, Reason Goal Not Met --  discharged from facility       Goal: Gait Training Goal LTG- PT  Outcome: Unable to achieve outcome(s) by discharge Date Met:  01/17/17 01/16/17 0812 01/17/17 1545   Gait Training PT LTG   Gait Training Goal PT LTG, Date Established 01/16/17 --    Gait Training Goal PT LTG, Time to Achieve by discharge --    Gait Training Goal PT LTG, [place] Level independent --    Gait Training Goal PT LTG, Distance to Achieve 500 --    Gait Training Goal PT LTG, Date Goal Reviewed --  01/17/17   Gait Training Goal PT LTG, Outcome --  goal not met   Gait Training Goal PT LTG, Reason Goal Not Met --  discharged from facility

## 2017-01-17 NOTE — PLAN OF CARE
Problem: Patient Care Overview (Adult)  Goal: Plan of Care Review  Outcome: Ongoing (interventions implemented as appropriate)    01/17/17 0409   Coping/Psychosocial Response Interventions   Plan Of Care Reviewed With patient   Patient Care Overview   Progress progress toward functional goals is gradual       Goal: Discharge Needs Assessment  Outcome: Ongoing (interventions implemented as appropriate)    01/17/17 0409   Discharge Needs Assessment   Concerns To Be Addressed no discharge needs identified         Problem: Respiratory Insufficiency (Adult)  Goal: Identify Related Risk Factors and Signs and Symptoms  Outcome: Ongoing (interventions implemented as appropriate)    01/17/17 0409   Respiratory Insufficiency   Related Risk Factors (Respiratory Insufficiency) activity intolerance;knowledge deficit;psychosocial factor   Signs and Symptoms (Respiratory Insufficiency) abnormal breath sounds         01/17/17 0409   Respiratory Insufficiency   Related Risk Factors (Respiratory Insufficiency) activity intolerance;knowledge deficit;psychosocial factor   Signs and Symptoms (Respiratory Insufficiency) abnormal breath sounds       Goal: Acid/Base Balance  Outcome: Ongoing (interventions implemented as appropriate)    01/17/17 0409   Respiratory Insufficiency (Adult)   Acid/Base Balance making progress toward outcome       Goal: Effective Ventilation  Outcome: Ongoing (interventions implemented as appropriate)    01/17/17 0409   Respiratory Insufficiency (Adult)   Effective Ventilation making progress toward outcome         Problem: Fall Risk (Adult)  Goal: Identify Related Risk Factors and Signs and Symptoms  Outcome: Ongoing (interventions implemented as appropriate)    01/17/17 0409   Fall Risk   Fall Risk: Related Risk Factors age-related changes;history of falls;gait/mobility problems   Fall Risk: Signs and Symptoms presence of risk factors       Goal: Absence of Falls  Outcome: Ongoing (interventions implemented as  appropriate)    01/17/17 0409   Fall Risk (Adult)   Absence of Falls achieves outcome

## 2017-01-17 NOTE — PROGRESS NOTES
"Acute Care - Speech Language Pathology   Swallow Treatment Note [facility]     Patient Name: Clay Rivas  : 1940  MRN: [id number]  Today's Date: 2017  Onset of Illness/Injury or Date of Surgery Date: 01/15/17            Admit Date: 1/15/2017    Visit Dx:      ICD-10-CM ICD-9-CM   1. Aspiration into airway, initial encounter T17.908A 934.9   2. Bronchitis J40 490   3. Nausea and vomiting, intractability of vomiting not specified, unspecified vomiting type R11.2 787.01   4. Dysphagia, unspecified type R13.10 787.20   5. Impaired functional mobility, balance, gait, and endurance Z74.09 V49.89   6. Impaired mobility and ADLs Z74.09 799.89     Patient Active Problem List   Diagnosis   • Aspiration into airway   • Hypertension   • Movement disorder             Adult Rehabilitation Note       17 1145 17 1550 17 1000    Rehab Assessment/Intervention    Discipline speech language pathologist  -PH physical therapy assistant  -MF     Document Type therapy note (daily note)  -PH      Patient Effort, Rehab Treatment poor  -PH      Patient Effort, Rehab Treatment Comment Patient refused any speech intervention and is being DCed and further OP therapy.   -PH      Treatment Not Performed  patient/family declined treatment  -MF     Treatment Not Performed, Comment  Pt. states he has already walked \" a long way today\" and does not wish to do so again.  -MF     Recorded by [PH] Luana Reno CCC-SLP [MF] Riya Smith, PTA     Pain Assessment    Pain Assessment No/denies pain  -PH      Recorded by [PH] Luana Reno CCC-SLP      Cognitive Assessment/Intervention    Current Cognitive/Communication Assessment did not assess  -PH      Orientation Status unable/difficult to assess  -PH      Recorded by [PH] Luana Reno CCC-SLP      Swallow Assessment/Intervention    Additional Documentation --   Pt ed about where to buy thickener& how to thicken liquids.   -PH      Recorded by [PH] Luana " WOODROW Reno, [facility]SLP      Dysphagia/Swallow Intervention    Dysphagia/Swallow Intervention   Pt will complete NMES for 30 minutes while completing swallow exercise 90%MODESTO.  -KW    Dysphagia/Swallow Therapeutic Feed   Pt will tolerate recommended diet and upgraded consistecies with no overt s/s of aspirtion.    -KW    Dysphagia/Swallow Caregiver Training --   ST reviewed where to buy/how to thicken liquids prior DC.   -PH      Recorded by [PH] Luana Reno, CCC-SLP  [KW] Tabitha Rendon, MS CCC-SLP    Dysphagia Treatment Objectives and Progress    Dysphagia Treatment Objectives Improve oral skills;Improve timing of pharyngeal response;Improve laryngeal elevation;Improve tongue base & pharyngeal wall squeeze  -PH      To Improve Oral Skills, patient will: Increase tongue tip elevation;Increase tongue lateralization;Increase tongue A-P movement;Increase back of tongue control;without cues;90%  -PH      Oral Skills Progress continue to adress   Unable to complete  -PH      To improve timing of pharyngeal response, patient will: Swallow in timely way after sensory input;90%;without cues  -PH      Timing of Pharyngeal Response Progress continue to adress   Unable to complete  -PH      To improve laryngeal elevation, patient will: Complete pitch-glide;Complete Mendelsohn maneuver;90%;without cues  -PH      Laryngeal Elevation Progress continue to adress   Unable to complete.   -PH      To improve tongue base & pharyngeal wall squeeze, patient will: Complete tongue hold swallow;Complete effortful swallow;90%;without cues  -PH      Tongue Base/Pharyngeal Wall Squeeze Progress continue to adress   UNable to complete  -PH      Recorded by [PH] Luana Reno, CCC-SLP      Bed Mobility, Assessment/Treatment    Bed Mobility, Assistive Device bed rails;head of bed elevated  -PH      Bed Mobility, Scoot/Bridge, [place] supervision required  -PH      Bed Mob, Supine to Sit, [place] supervision required;verbal cues  required  -PH      Bed Mobility, Impairments impaired balance;coordination impaired  -PH      Recorded by [PH] DAVIAN Beach      Transfer Assessment/Treatment    Transfers, Bed-Chair [place] contact guard assist;verbal cues required  -PH      Transfers, Sit-Stand [place] contact guard assist;verbal cues required  -PH      Transfers, Stand-Sit [place] contact guard assist;verbal cues required  -PH      Transfer, Safety Issues step length decreased;loses balance backward   1 LOB upon standing able to correct with CGA  -PH      Transfer, Impairments impaired balance;coordination impaired  -PH      Recorded by [PH] Luana Reno CCC-SLP      Gait Assessment/Treatment    Gait, [place] Level contact guard assist;hand held assist;stand by assist   CGA with HHA improved to SBA  -PH      Gait, Gait Pattern Analysis swing-through gait  -PH      Gait, Impairments impaired balance;coordination impaired  -PH      Recorded by [PH] Luana Reno CCC-SLP      Functional Mobility    Functional Mobility- Ind. Level contact guard assist;supervision required;verbal cues required  -PH      Recorded by [PH] Luana Reno CCC-SLP      Lower Body Dressing Assessment/Training    LB Dressing Assess/Train, Clothing Type donning:  -PH      Recorded by [PH] Luana Reno CCC-SLP      Motor Skills/Interventions    Motor Response Observations ataxia  -PH      Recorded by [PH] Luana Reno CCC-SLP      Balance Skills Training    Sitting-Level of Assistance Distant supervision  -PH      Sitting-Balance Support Feet supported  -PH      Standing-Level of Assistance Contact guard;Close supervision  -PH      Static Standing Balance Support Left upper extremity supported  -PH      Gait Balance-Level of Assistance Contact guard;Close supervision   progressed to close supervision  -PH      Gait Balance Support Left upper extremity supported   progressed away from HHA  -PH      Recorded by [PH] Luana TROY  [place], CCC-SLP      Gross Motor Coordination Training    Gross Motor Skill, Impairments Detail slight impairment BLE during transfers and gait  -PH      Recorded by [PH] Luana Reno CCC-SLP        01/16/17 0830          Swallow Assessment/Intervention    Additional Documentation Dysphagia/Swallow Intervention (Group)  -TM      Recorded by [TM] DAVIAN Le      Dysphagia/Swallow Intervention    Dysphagia/Swallow Therapeutic Feed Pt will tolerate recommended and upgraded diet consistencies without overt s/s of aspiration.   -TM      Recorded by [TM] DAVIAN Le        User Key  (r) = Recorded By, (t) = Taken By, (c) = Cosigned By    Initials Name Effective Dates    PH Luana Reno, CCC-SLP 08/02/16 -     TM DAVIAN Le 08/02/16 -     KW Tabitha Rendon, MS CCC-SLP 08/02/16 -     MF Riya Smith, PTA 08/02/16 -                   IP SLP Goals       01/17/17 1208 01/16/17 1513 01/16/17 0941    Safely Consume Diet    Safely Consume Diet- SLP, Date Established  01/16/17  -KW 01/16/17  -TM    Safely Consume Diet- SLP, Time to Achieve by discharge  -PH by discharge  -KW by discharge  -TM    Safely Consume Diet- SLP, Activity Level  Patient will improve oral skills for more efficient eating;Patient will improve timing of pharyngeal response for safer, more efficient swallow;Patient will improve laryngeal elevation for safer, more efficient swallow;Patient will improve tongue base/pharyngeal wall squeeze for safer, more efficient swallow  -KW     Safely Consume Diet- SLP, Additional Goal  Pt will complete NMES paired with swallow exercises and tolerate trials of PO with no overt s/s of aspiration.  -KW Pt will tolerate recommended and upgraded diet consistencies without overt s/s of aspiration.   -TM    Safely Consume Diet- SLP, Date Goal Reviewed 01/17/17  -PH 01/16/17  -KW 01/16/17  -TM    Safely Consume Diet- SLP, Outcome   goal ongoing  -TM      User Key  (r) = Recorded  By, (t) = Taken By, (c) = Cosigned By    Initials Name Provider Type     Luana Reno, CCC-SLP Speech and Language Pathologist    ABDIEL Stallings, CCC-SLP Speech and Language Pathologist    NICK Rendon, MS CCC-SLP Speech and Language Pathologist          EDUCATION  The patient has been educated in the following areas:   Dysphagia (Swallowing Impairment).    SLP Recommendation and Plan  SLP Swallowing Diagnosis: moderate dysphagia  SLP Diet Recommendation: soft textures, ground, pudding-thick liquids     SLP Rec. for Method of Medication Administration: meds crushed in pudding/applesauce  Monitor For Signs Of Aspiration: cough, throat clearing  Recommended Diagnostics: [facility]SS ([facility])  Criteria for Skilled Therapeutic Interventions Met: demonstrates skilled criteria for intervention  Anticipated Discharge Disposition: home with assist  Rehab Potential/Prognosis, Swallowing: good, to achieve stated therapy goals  Therapy Frequency: 3-5 times/wk        Demonstrates Need for Referral to Another Service: neurology    Plan of Care Review  Plan Of Care Reviewed With: patient  Progress: progress toward functional goals is gradual  Outcome Summary/Follow up Plan: mechanical soft; pudding thick       SLP Outcome Measures (last 72 hours)      SLP Outcome Measures       01/16/17 1500 01/16/17 0946       SLP Outcome Measures    Outcome Measure Used? Adult NOMS  -KW --  -TM     FCM Scores    FCM Chosen Swallowing  -KW      Swallowing FCM Score 4  -KW        User Key  (r) = Recorded By, (t) = Taken By, (c) = Cosigned By    Initials Name Effective Dates     Arpita Stallings CCC-SLP 08/02/16 -     NICK Rendon, MS [facility]-SLP 08/02/16 -              Time Calculation:         Time Calculation- SLP       01/17/17 1255          Time Calculation- SLP    SLP Start Time 1130  -PH      SLP Stop Time 1145  -PH      SLP Time Calculation (min) 15 min  -PH      SLP Received On 01/17/17  -PH        User Key  (r) = Recorded By, (t) =  Taken By, (c) = Cosigned By    Initials Name Provider Type    PH Luana Reno CCC-SLP Speech and Language Pathologist          Therapy Charges for Today     Code Description Service Date Service Provider Modifiers Qty    [id number] HC ST TREATMENT SWALLOW 1 1/17/2017 DAVIAN Beach GN, KX 1          SLP G-Codes  SLP NOMS Used?: Yes  Functional Limitations: Swallowing  Swallow Current Status (): At least 40 percent but less than 60 percent impaired, limited or restricted  Swallow Goal Status (): At least 40 percent but less than 60 percent impaired, limited or restricted      DAVIAN Wing  1/17/2017

## 2017-01-17 NOTE — PLAN OF CARE
Problem: Inpatient Occupational Therapy  Goal: Transfer Training Goal 1 LTG- OT  Outcome: Unable to achieve outcome(s) by discharge Date Met:  01/17/17 01/16/17 0953 01/17/17 1202   Transfer Training OT LTG   Transfer Training OT LTG, Date Established 01/16/17 --    Transfer Training OT LTG, Time to Achieve by discharge --    Transfer Training OT LTG, Activity Type bed to chair /chair to bed;sit to stand/stand to sit;toilet --    Transfer Training OT LTG, [place] Level conditional independence --    Transfer Training OT LTG, Date Goal Reviewed --  01/17/17   Transfer Training OT LTG, Outcome --  goal not met   Transfer Training OT LTG, Reason Goal Not Met --  discharged from facility       Goal: Grooming Goal LTG- OT  Outcome: Unable to achieve outcome(s) by discharge Date Met:  01/17/17 01/16/17 0953 01/17/17 1202   Grooming OT LTG   Grooming Goal OT LTG, Date Established 01/16/17 --    Grooming Goal OT LTG, Time to Achieve by discharge --    Grooming Goal OT LTG, [place] Level conditional independence --    Grooming Goal OT LTG, Position standing --    Grooming Goal OT LTG, Date Goal Reviewed --  01/17/17   Grooming Goal OT LTG, Outcome --  goal not met   Grooming Goal OT LTG, Reason Goal Not Met --  discharged from facility       Goal: Toileting Goal LTG- OT  Outcome: Unable to achieve outcome(s) by discharge Date Met:  01/17/17 01/16/17 0953 01/17/17 1202   Toileting OT LTG   Toileting Goal OT LTG, Date Established 01/16/17 --    Toileting Goal OT LTG, Time to Achieve by discharge --    Toileting Goal OT LTG, [place] Level conditional independence --    Toileting Goal OT LTG, Date Goal Reviewed --  01/17/17   Toileting Goal OT LTG, Outcome --  goal not met   Toileting Goal OT LTG, Reason Goal Not Met --  discharged from facility       Goal: LB Dressing Goal LTG- OT  Outcome: Unable to achieve outcome(s) by discharge Date Met:  01/17/17 01/16/17 0953 01/17/17 1202   LB Dressing OT LTG    LB Dressing Goal OT LTG, Date Established 01/16/17 --    LB Dressing Goal OT LTG, Time to Achieve by discharge --    LB Dressing Goal OT LTG, [place] Level conditional independence --    LB Dressing Goal OT LTG, Date Goal Reviewed --  01/17/17   LB Dressing Goal OT LTG, Outcome --  goal not met   LB Dressing Goal OT LTG, Reason Goal Not Met --  discharged from facility       Goal: Coordination Goal LTG- OT  Outcome: Unable to achieve outcome(s) by discharge Date Met:  01/17/17 01/16/17 0953 01/17/17 1202   Coordination OT LTG   Coordination OT LTG, Date Established 01/16/17 --    Coordination OT LTG, Time to Achieve by discharge --    Coordination OT LTG, Activity Type FM task;GM task --    Coordination OT LTG, [place] Level independent --    Coordination OT LTG, Date Goal Reviewed --  01/17/17   Coordination OT LTG, Outcome --  goal not met   Coordination OT LTG, Reason Goal Not Met --  discharged from facility

## 2017-01-17 NOTE — PLAN OF CARE
Problem: Inpatient SLP  Goal: Dysphagia- Patient will safely consume diet as per recommendation with no signs/symptoms of aspiration  Outcome: Unable to achieve outcome(s) by discharge Date Met:  01/17/17 01/16/17 1513 01/17/17 1208 01/17/17 1611   Safely Consume Diet   Safely Consume Diet- SLP, Date Established 01/16/17 --  --    Safely Consume Diet- SLP, Time to Achieve --  by discharge --    Safely Consume Diet- SLP, Activity Level Patient will improve oral skills for more efficient eating;Patient will improve timing of pharyngeal response for safer, more efficient swallow;Patient will improve laryngeal elevation for safer, more efficient swallow;Patient will improve tongue base/pharyngeal wall squeeze for safer, more efficient swallow --  --    Safely Consume Diet- SLP, Additional Goal Pt will complete NMES paired with swallow exercises and tolerate trials of PO with no overt s/s of aspiration. --  --    Safely Consume Diet- SLP, Date Goal Reviewed --  01/17/17 --    Safely Consume Diet- SLP, Outcome --  --  goal not met   Safely Consume Diet- SLP, Reason Goal Not Met --  --  discharged from facility

## 2017-01-17 NOTE — PLAN OF CARE
Problem: Inpatient SLP  Goal: Dysphagia- Patient will safely consume diet as per recommendation with no signs/symptoms of aspiration  Outcome: Ongoing (interventions implemented as appropriate)    01/16/17 0941 01/16/17 1513 01/17/17 1208   Safely Consume Diet   Safely Consume Diet- SLP, Date Established --  01/16/17 --    Safely Consume Diet- SLP, Time to Achieve --  --  by discharge   Safely Consume Diet- SLP, Activity Level --  Patient will improve oral skills for more efficient eating;Patient will improve timing of pharyngeal response for safer, more efficient swallow;Patient will improve laryngeal elevation for safer, more efficient swallow;Patient will improve tongue base/pharyngeal wall squeeze for safer, more efficient swallow --    Safely Consume Diet- SLP, Additional Goal --  Pt will complete NMES paired with swallow exercises and tolerate trials of PO with no overt s/s of aspiration. --    Safely Consume Diet- SLP, Date Goal Reviewed --  --  01/17/17   Safely Consume Diet- SLP, Outcome goal ongoing --  --

## 2017-01-17 NOTE — PROGRESS NOTES
Continued Stay Note   [place]     Patient Name: Clay Rivas  MRN: [id number]  Today's Date: 1/17/2017    Admit Date: 1/15/2017          Discharge Plan       01/17/17 1104    Case Management/Social Work Plan    Plan Home    Final Note    Final Note Pt is discharging home today. Pt has HH orders, but absolutely refuses this to be set up. Family in room could not convince him either.                Discharge Codes     None        Expected Discharge Date and Time     Expected Discharge Date Expected Discharge Time    Jan 17, 2017             BRIANA Wang

## 2017-01-17 NOTE — THERAPY DISCHARGE NOTE
Acute Care - Speech Language Pathology Discharge Summary  [facility]       Patient Name: Clay Rivas  : 1940  MRN: [id number]    Today's Date: 2017  Onset of Illness/Injury or Date of Surgery Date: 01/15/17                Admit Date: 1/15/2017      SLP Recommendation and Plan  Mechanical soft solids and pudding thick liquids    Visit Dx:    ICD-10-CM ICD-9-CM   1. Aspiration into airway, initial encounter T17.908A 934.9   2. Bronchitis J40 490   3. Nausea and vomiting, intractability of vomiting not specified, unspecified vomiting type R11.2 787.01   4. Dysphagia, unspecified type R13.10 787.20   5. Impaired functional mobility, balance, gait, and endurance Z74.09 V49.89   6. Impaired mobility and ADLs Z74.09 799.89               Time Calculation- SLP       17 1255          Time Calculation- SLP    SLP Start Time 1130  -PH      SLP Stop Time 1145  -PH      SLP Time Calculation (min) 15 min  -PH      SLP Received On 17  -PH        User Key  (r) = Recorded By, (t) = Taken By, (c) = Cosigned By    Initials Name Provider Type    PH Luana Reno CCC-SLP Speech and Language Pathologist                  IP SLP Goals       17 1611 17 1208 17 1513    Safely Consume Diet    Safely Consume Diet- SLP, Date Established   17  -KW    Safely Consume Diet- SLP, Time to Achieve  by discharge  -PH by discharge  -KW    Safely Consume Diet- SLP, Activity Level   Patient will improve oral skills for more efficient eating;Patient will improve timing of pharyngeal response for safer, more efficient swallow;Patient will improve laryngeal elevation for safer, more efficient swallow;Patient will improve tongue base/pharyngeal wall squeeze for safer, more efficient swallow  -KW    Safely Consume Diet- SLP, Additional Goal   Pt will complete NMES paired with swallow exercises and tolerate trials of PO with no overt s/s of aspiration.  -KW    Safely Consume Diet- SLP, Date Goal Reviewed   01/17/17  -PH 01/16/17  -KW    Safely Consume Diet- SLP, Outcome (P)  goal not met  -MB      Safely Consume Diet- SLP, Reason Goal Not Met (P)  discharged from facility  -MB        01/16/17 0941          Safely Consume Diet    Safely Consume Diet- SLP, Date Established 01/16/17  -TM      Safely Consume Diet- SLP, Time to Achieve by discharge  -TM      Safely Consume Diet- SLP, Additional Goal Pt will tolerate recommended and upgraded diet consistencies without overt s/s of aspiration.   -TM      Safely Consume Diet- SLP, Date Goal Reviewed 01/16/17  -TM      Safely Consume Diet- SLP, Outcome goal ongoing  -TM        User Key  (r) = Recorded By, (t) = Taken By, (c) = Cosigned By    Initials Name Provider Type    DOUGLAS Charles, CCC-SLP Speech and Language Pathologist    PH Luana Reno, CCC-SLP Speech and Language Pathologist    TM Arpita Stallings, CCC-SLP Speech and Language Pathologist    KW Tabitha Rendon, MS CCC-SLP Speech and Language Pathologist                  SLP Discharge Summary  Anticipated Discharge Disposition: home with assist  Reason for Discharge: Discharge from facility  Outcomes Achieved: Unable to make functional progress toward goals at this time  Discharge Destination: Home      Zacarias Charles CCC-SLP  1/17/2017

## 2017-01-17 NOTE — DISCHARGE SUMMARY
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)  Leukocytosis - resolved  Elevated TSH  Moderate oropharyngeal dysphagia    Procedures Performed: None    Pertinent Test Results:   Lab Results (last 72 hours)     Procedure Component Value Units Date/Time    POC Troponin, Rapid [[id number]]  (Normal) Collected:  01/15/17 1222    Specimen:  Blood Updated:  01/15/17 1235     Troponin I 0.00 ng/mL       Serial Number: [id number]    : [id number]       Comprehensive Metabolic Panel [[id number]]  (Abnormal) Collected:  01/15/17 1208    Specimen:  Blood from Arm, Right Updated:  01/15/17 1246     Glucose 105 (H) mg/dL      BUN 17 mg/dL      Creatinine 1.04 mg/dL      Sodium 139 mmol/L      Potassium 4.1 mmol/L      Chloride 100 mmol/L      CO2 26.0 mmol/L      Calcium 8.4 mg/dL      Total Protein 8.2 g/dL      Albumin 4.50 g/dL      ALT (SGPT) 36 U/L      AST (SGOT) 30 U/L      Alkaline Phosphatase 64 U/L      Total Bilirubin 0.7 mg/dL      eGFR Non African Amer 70 mL/min/1.73      Globulin 3.7 gm/dL      A/G Ratio 1.2 g/dL      BUN/Creatinine Ratio 16.3      Anion Gap 13.0 mmol/L     Narrative:       The MDRD GFR formula is only valid for adults with stable renal function between ages 18 and 70.    Amylase [[id number]]  (Abnormal) Collected:  01/15/17 1208    Specimen:  Blood from Arm, Right Updated:  01/15/17 1246     Amylase 127 (H) U/L     Lipase [[id number]]  (Normal) Collected:  01/15/17 1208    Specimen:  Blood from Arm, Right Updated:  01/15/17 1246     Lipase 121 U/L     Influenza Antigen [[id number]]  (Normal) Collected:  01/15/17 1203    Specimen:  Swab from Nasopharynx Updated:  01/15/17 1254     Influenza A Ag, EIA  Negative      Influenza B Ag, EIA Negative     CBC Auto Differential [[id number]]  (Abnormal) Collected:  01/15/17 1208    Specimen:  Blood from Arm, Right Updated:  01/15/17 1258     WBC 9.28 10*3/mm3      RBC 4.69 (L) 10*6/mm3      Hemoglobin 14.3 g/dL      Hematocrit 41.2 %      MCV 87.8 fL      MCH 30.5 pg      MCHC 34.7 g/dL      RDW 14.0 %      RDW-SD 45.0 fl      MPV 11.0 fL      Platelets 120 (L) 10*3/mm3      Neutrophil % 40.0 %      Lymphocyte % 12.8 (L) %      Monocyte % 46.1 (H) %      Eosinophil % 0.1 %      Basophil % 0.1 %      Immature Grans % 0.9 %      Neutrophils, Absolute 3.71 10*3/mm3      Lymphocytes, Absolute 1.19 10*3/mm3      Monocytes, Absolute 4.28 (H) 10*3/mm3      Eosinophils, Absolute 0.01 10*3/mm3      Basophils, Absolute 0.01 10*3/mm3      Immature Grans, Absolute 0.08 (H) 10*3/mm3     Scan Slide [[id number]] Collected:  01/15/17 1208    Specimen:  Blood from Arm, Right Updated:  01/15/17 1258     RBC Morphology Normal      WBC Morphology Normal      Platelet Estimate Decreased     Urinalysis With / Culture If Indicated [[id number]]  (Normal) Collected:  01/15/17 1326    Specimen:  Urine from Urine, Clean Catch Updated:  01/15/17 1340     Color, UA Yellow      Appearance, UA Clear      pH, UA <=5.0      Specific Gravity, UA 1.018      Glucose, UA Negative      Ketones, UA Negative      Bilirubin, UA Negative      Blood, UA Negative      Protein, UA Negative      Leuk Esterase, UA Negative      Nitrite, UA Negative      Urobilinogen, UA 0.2 E.U./dL     Protime-INR [[id number]]  (Normal) Collected:  01/15/17 1917    Specimen:  Blood Updated:  01/15/17 1944     Protime 14.5 Seconds      INR 1.09     Basic Metabolic Panel [[id number]]  (Abnormal) Collected:  01/15/17 1913    Specimen:  Blood Updated:  01/15/17 2007     Glucose 111 (H) mg/dL      BUN 15 mg/dL      Creatinine 1.02 mg/dL      Sodium 137 mmol/L      Potassium 4.0 mmol/L      Chloride 97 (L) mmol/L      CO2 26.0 mmol/L      Calcium 8.4  mg/dL      eGFR Non African Amer 71 mL/min/1.73      BUN/Creatinine Ratio 14.7      Anion Gap 14.0 (H) mmol/L     Narrative:       The MDRD GFR formula is only valid for adults with stable renal function between ages 18 and 70.    CBC Auto Differential [[id number]]  (Abnormal) Collected:  01/15/17 1913    Specimen:  Blood Updated:  01/15/17 2007     WBC 16.11 (H) 10*3/mm3      RBC 4.59 (L) 10*6/mm3      Hemoglobin 14.0 g/dL      Hematocrit 40.2 %      MCV 87.6 fL      MCH 30.5 pg      MCHC 34.8 g/dL      RDW 14.1 %      RDW-SD 44.8 fl      MPV 10.9 fL      Platelets 123 (L) 10*3/mm3     Manual Differential [[id number]]  (Abnormal) Collected:  01/15/17 1913    Specimen:  Blood Updated:  01/15/17 2007     Neutrophil % 55.0 %      Lymphocyte % 1.0 (L) %      Monocyte % 20.0 (H) %      Bands %  12.0 (H) %      Metamyelocyte % 0.0 %      Atypical Lymphocyte % 12.0 (H) %      Neutrophils Absolute 10.79 (H) 10*3/mm3      Lymphocytes Absolute 0.16 (L) 10*3/mm3      Monocytes Absolute 3.22 (H) 10*3/mm3      Poikilocytes Slight/1+      WBC Morphology Normal      Platelet Morphology Normal      Platelet Estimate --       SLIGHTLY DECREASED       Basic Metabolic Panel [[id number]]  (Normal) Collected:  01/16/17 0511    Specimen:  Blood Updated:  01/16/17 0549     Glucose 100 mg/dL      BUN 16 mg/dL      Creatinine 1.04 mg/dL      Sodium 140 mmol/L      Potassium 4.0 mmol/L      Chloride 101 mmol/L      CO2 26.0 mmol/L      Calcium 8.4 mg/dL      eGFR Non African Amer 70 mL/min/1.73      BUN/Creatinine Ratio 15.4      Anion Gap 13.0 mmol/L     CBC Auto Differential [[id number]]  (Abnormal) Collected:  01/16/17 0511    Specimen:  Blood Updated:  01/16/17 0616     WBC 14.12 (H) 10*3/mm3      RBC 4.62 (L) 10*6/mm3      Hemoglobin 13.7 (L) g/dL      Hematocrit 40.7 %      MCV 88.1 fL      MCH 29.7 pg      MCHC 33.7 g/dL      RDW 14.1 %      RDW-SD 45.3 fl      MPV 10.9 fL      Platelets 118 (L) 10*3/mm3     Scan Slide [[id number]] Collected:   01/16/17 0511    Specimen:  Blood Updated:  01/16/17 0616     Scan Slide --       See Manual Differential Results       Manual Differential [[id number]]  (Abnormal) Collected:  01/16/17 0511    Specimen:  Blood Updated:  01/16/17 0616     Neutrophil % 52.0 %      Lymphocyte % 11.0 (L) %      Monocyte % 32.0 (H) %      Bands %  1.0 %      Atypical Lymphocyte % 4.0 %      Neutrophils Absolute 7.48 10*3/mm3      Lymphocytes Absolute 1.55 10*3/mm3      Monocytes Absolute 4.52 (H) 10*3/mm3      Poikilocytes Slight/1+      WBC Morphology Normal      Platelet Morphology Normal     CHRISTIANO Comprehensive Panel [[id number]] Collected:  01/16/17 1304    Specimen:  Blood Updated:  01/16/17 1307    Ammonia [[id number]]  (Abnormal) Collected:  01/16/17 1304    Specimen:  Blood Updated:  01/16/17 1324     Ammonia <9 (L) umol/L     AST [[id number]]  (Normal) Collected:  01/16/17 1304    Specimen:  Blood Updated:  01/16/17 1328     AST (SGOT) 36 U/L     ALT [[id number]]  (Normal) Collected:  01/16/17 1304    Specimen:  Blood Updated:  01/16/17 1328     ALT (SGPT) 34 U/L     Basic Metabolic Panel [[id number]]  (Abnormal) Collected:  01/17/17 0550    Specimen:  Blood Updated:  01/17/17 0641     Glucose 96 mg/dL      BUN 15 mg/dL      Creatinine 0.95 mg/dL      Sodium 138 mmol/L      Potassium 3.7 mmol/L      Chloride 101 mmol/L      CO2 24.0 mmol/L      Calcium 8.3 (L) mg/dL      eGFR Non African Amer 77 mL/min/1.73      BUN/Creatinine Ratio 15.8      Anion Gap 13.0 mmol/L     Narrative:       The MDRD GFR formula is only valid for adults with stable renal function between ages 18 and 70.    Sedimentation Rate [[id number]]  (Normal) Collected:  01/17/17 0550    Specimen:  Blood Updated:  01/17/17 0658     Sed Rate 6 mm/hr     CBC Auto Differential [[id number]]  (Abnormal) Collected:  01/17/17 0550    Specimen:  Blood Updated:  01/17/17 0702     WBC 9.22 10*3/mm3      RBC 4.27 (L) 10*6/mm3      Hemoglobin 12.8 (L) g/dL      Hematocrit 36.9 (L) %      MCV  86.4 fL      MCH 30.0 pg      MCHC 34.7 g/dL      RDW 13.9 %      RDW-SD 43.5 fl      MPV 11.0 fL      Platelets 114 (L) 10*3/mm3      Neutrophil % 36.9 (L) %      Lymphocyte % 13.7 (L) %      Monocyte % 48.8 (H) %      Eosinophil % 0.1 %      Basophil % 0.1 %      Immature Grans % 0.4 %      Neutrophils, Absolute 3.40 10*3/mm3      Lymphocytes, Absolute 1.26 10*3/mm3      Monocytes, Absolute 4.50 (H) 10*3/mm3      Eosinophils, Absolute 0.01 10*3/mm3      Basophils, Absolute 0.01 10*3/mm3      Immature Grans, Absolute 0.04 (H) 10*3/mm3     Narrative:       Appended report.  These results have been appended to a previously verified report.    Scan Slide [[id number]]  (Normal) Collected:  01/17/17 0550    Specimen:  Blood Updated:  01/17/17 0702     RBC Morphology Normal      WBC Morphology Normal      Platelet Morphology Normal     TSH [[id number]]  (Abnormal) Collected:  01/17/17 0550    Specimen:  Blood Updated:  01/17/17 0712     TSH 4.970 (H) mIU/mL     Vitamin B12 [[id number]]  (Normal) Collected:  01/17/17 0550    Specimen:  Blood Updated:  01/17/17 0730     Vitamin B-12 458 pg/mL     T3, Free [[id number]]  (Normal) Collected:  01/17/17 0550    Specimen:  Blood Updated:  01/17/17 0829     T3, Free 3.27 pg/mL     T4, Free [[id number]]  (Normal) Collected:  01/17/17 0550    Specimen:  Blood Updated:  01/17/17 0829     Free T4 1.36 ng/dL         Imaging Results (all)     Procedure Component Value Units Date/Time    XR Abdomen Flat & Upright [[id number]] Collected:  01/15/17 1340     Updated:  01/15/17 1343    Narrative:       ABDOMEN FLAT AND UPRIGHT 1/15/2017 1:20 PM CST     HISTORY: Nausea     COMPARISON: 01/19/2012     FINDINGS:  The lung bases are clear. There is a nonspecific bowel gas pattern with  gas in both small bowel and colon. No evidence of mechanical  obstruction.. No free intraperitoneal air is present. No pathologic  calcification is seen.     The osseous structures are normal in appearance.       Impression:        Nonspecific bowel gas pattern. No evidence of mechanical obstruction or  pneumoperitoneum..     This report was finalized on 01/15/2017 13:41 by Dr. Otilio Garcia MD.    XR Chest 1 View [[id number]] Collected:  01/15/17 1400     Updated:  01/15/17 1403    Narrative:       HISTORY: Cough.     FINDINGS: Today's exam is compared to previous study dated 01/19/2012.  There are emphysematous changes within the upper lobes. There is no  evidence of acute lobar pneumonia or effusion. There is some tortuosity  of the thoracic aorta. There is an old chronic right a.c. separation.       Impression:       . No active disease.  This report was finalized on 01/15/2017 14:01 by Dr. Otilio Garcia MD.    CT Head Without Contrast [[id number]] Collected:  01/15/17 1448     Updated:  01/15/17 1453    Narrative:       CT BRAIN without contrast 1/15/2017 2:35 PM CST     HISTORY: Speech issues. Involuntary movement.     COMPARISON: 03/16/2016      DLP: 1330 mGy cm. Automated exposure control was utilized to diminish  patient radiation dose.     TECHNIQUE: Serial axial tomographic images of the brain were obtained  without the use of intravenous contrast.      FINDINGS:   The midline structures are nondisplaced. There is moderate cerebral and  cerebellar volume loss, with an associated increase in the prominence of  the ventricles and sulci. The basilar cisterns are normal in size and  configuration. There is no evidence of intracranial hemorrhage or  mass-effect. There is low attenuation in the periventricular white  matter, consistent with chronic ischemic change. There are no abnormal  extra-axial fluid collections. There is no evidence of tonsillar  herniation.      The orbits are intact. The patient has undergone previous fixation for a  left superior orbital rim fracture.. The visualized paranasal sinuses,  mastoid air cells and middle ear cavities are clear. The visualized  osseous structures and overlying soft tissues of  the skull and face are  intact.        Impression:       Moderate cerebral and cerebellar volume loss with chronic microvascular  disease but no evidence of acute intracranial process.        This report was finalized on 01/15/2017 14:51 by Dr. Otilio Garcia MD.    XR Chest PA & Lateral [[id number]] Collected:  01/16/17 1014     Updated:  01/16/17 1017    Narrative:       XR CHEST PA AND LATERAL- 1/16/2017 10:02 CST     HISTORY: cough; T17.908A-Unspecified foreign body in respiratory tract,  part unspecified causing other injury, initial encounter;  J40-Bronchitis, not specified as acute or chronic; R11.2-Nausea with  vomiting, unspecified; R13.10-Dysphagia, unspecified; Z74.09-Other  reduced mobility      COMPARISON: 01/19/2012     FINDINGS:   The lungs are slightly emphysematous but clear. The cardiomediastinal  silhouette and pulmonary vascularity are unchanged. The osseous  structures and surrounding soft tissues demonstrate no acute  abnormality.       Impression:       1. Emphysematous changes without evidence of acute cardiopulmonary process.        Electronically Signed By-Dr. Fredy Prince MD On:1/16/2017 11:15 AM EST  This report was finalized on 01/16/2017 10:15 by Dr. Fredy Prince MD.    FL Video Swallow With Speech [[id number]] Collected:  01/16/17 1330     Updated:  01/16/17 1335    Narrative:       EXAMINATION: FL VIDEO SWALLOW W SPEECH-     1/16/2017 10:06 AM CST     HISTORY: Dysphagia; T17.908A-Unspecified foreign body in respiratory  tract, part unspecified causing other injury, initial encounter;  J40-Bronchitis, not specified as acute or chronic; R11.2-Nausea with  vomiting, unspecified; R13.10-Dysphagia, unspecified; Z74.09-Other  reduced mobility     Routine Dysphagia exam under fluoroscopy with the patient seated.   A speech therapist conducted the exam.   The exam was recorded on DVD.     Fluoroscopy time = 2.8 minutes.  A single image was obtained for documentation.  Consistent non  inversion of the epiglottis.  Consistently delayed and somewhat non coordinated oral phase.     Honey consistency:  Moderate vallecular and performed residual. Silent aspiration of  residue.     Pudding consistency:  Adequate handling. Moderate vallecular residual.     Repeat honey:  Adequate handling no aspiration.     Nectar consistency:  Adequate handling. Mild residual.     Thin liquid consistency:  Laryngeal penetration. No aspiration.     Fruit:  Adequate handling.     Cracker:  Moderate vallecular and piriform residual.     Electronically Signed By-Dr. Andres Ferreira MD On:1/16/2017 2:33 PM EST  This report was finalized on 01/16/2017 13:33 by Dr. Andres Ferreira MD.    MRI Brain With & Without Contrast [[id number]] Collected:  01/16/17 1339     Updated:  01/16/17 1346    Narrative:       MRI BRAIN W WO CONTRAST- 1/16/2017 11:46 CST     HISTORY: [place] for movements, weakness, slurred speech;  T17.908A-Unspecified foreign body in respiratory tract, part unspecified  causing other injury, initial encounter; J40-Bronchitis, not specified  as acute or chronic; R11.2-Nausea with vomiting, unspecified;  R13.10-Dysphagia, unspecified; Z74.09-Other reduced mobility;  Z74.09-Other reduced mobility     COMPARISON: CT brain 01/15/2017       TECHNIQUE: Multiplanar imaging of the brain was performed in a routine  fashion before and after the intravenous injection of gadolinium  contrast.     FINDINGS:   Diffusion: No restriction of diffusion to suggest acute ischemia.     Midline structures: Nondisplaced.     Ventricles: Mild prominence of the ventricles without mass effect or a  symmetry.     Masses: No masses or mass effect.     Basilar cisterns: Maintained.     Extra axial space: No abnormal extra-axial fluid.     Gray-white matter signal: Normal signal and differentiation. The heads  of the caudate nuclei are maintained. 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 Hypointense signal is seen on  the  gradient echo images within the caudate nuclei.     Cerebellum: Normal.     Brainstem: Normal.     Enhancement: No abnormal enhancement.     Other: Proximal cervical spinal cord is normal. Bilateral globes and  orbits are normal in appearance. Normal cerebrovascular flow voids  noted. Moderate paranasal sinus disease is present.       Impression:       1. Generalized volume loss.  2. No focal atrophy of the caudate nuclei.        Electronically Signed By-Dr. Fredy Prince MD On:1/16/2017 2:44 PM  EST  This report was finalized on 01/16/2017 13:44 by Dr. Fredy Prince MD.          Consults: Camilla Jones MD Neurology    Chief Complaint on Day of Discharge: None    Hospital Course  Patient is a 76 y.o. male presented with complaints of cough, choking and nausea.  Patient reported flulike symptoms for approximately 4 days prior to admission.  Family members in attendance reported slurred speech and difficulty with articulation.  The emergency Department he did have an episode consistent with aspiration and respiratory distress.  He was having overt symptoms of difficulty handling his oral secretions.  Speech therapy has evaluated and recommended pudding thick liquid, mechanical soft consistency food and safety measures.  Neurology did see and assess the patient, ordered various labs all have returned except for a CHRISTIANO panel.  MRI of the brain was negative.  Patient is refusing follow-up with neurology stating he has Liz care provider, Dr. Edwards, can take care of him.  He is also refusing home health.  Due to complaints of cough, congestion sore throat and postnasal nasal drip, patient will be discharged home with antibiotic and Medrol Dosepak.  The chest x-ray was positive for emphysema type changes no acute findings.  Today the patient is stable for discharge.  I do have concerns regarding his refusal for home health to follow with speech therapy.  Diet instructions have been provided.    Condition on  "Discharge:  Unable    Physical Exam on Discharge:  Visit Vitals   • /88   • Pulse 84   • Temp 98 °F (36.7 °C)   • Resp 20   • Ht 71\" (180.3 cm)   • Wt 166 lb 12.8 oz (75.7 kg)   • SpO2 95%   • BMI 23.26 kg/m2     Physical Exam  Constitutional: He is oriented to person, place, and time. He appears well-developed and well-nourished. No distress.   HENT:   Head: Normocephalic and atraumatic.   Poor dentation   Eyes: Conjunctivae and EOM are normal. Pupils are equal, round, and reactive to light. No scleral icterus.   Neck: Normal range of motion. Neck supple. No JVD present. No tracheal deviation present.   Cardiovascular: Normal rate, regular rhythm, normal heart sounds and intact distal pulses. Exam reveals no gallop.   No murmur heard.  Pulmonary/Chest: Effort normal and breath sounds normal. No respiratory distress. He has no wheezes. He has no rales.   Abdominal: Soft. Bowel sounds are normal. He exhibits no distension. There is no tenderness. There is no guarding.   Musculoskeletal: Normal range of motion. He exhibits no edema.   Neurological: He is alert and oriented to person, place, and time.   Constant involuntary movements   Skin: Skin is warm and dry. No rash noted. He is not diaphoretic. No erythema. No pallor.   Psychiatric: He has a normal mood and affect. His behavior is normal.   Vitals reviewed  Discharge Disposition:  Home or Self Care    Discharge Medications:   Clay Rivas   Home Medication Instructions ARMAAN:[id number]    Printed on:01/17/17 1013   Medication Information                      aspirin 81 MG EC tablet  Take 81 mg by mouth Daily.             doxycycline (MONODOX) 100 MG capsule  Take 1 capsule by mouth 2 (Two) Times a Day for 7 days.             lisinopril (PRINIVIL,ZESTRIL) 10 MG tablet  Take 5 mg by mouth Daily.             MethylPREDNISolone (MEDROL, OWEN,) 4 MG tablet  Take as directed on package instructions.             pseudoephedrine-guaifenesin (MUCINEX D)  " MG per 12 hr tablet  Take 1 tablet by mouth Every 12 (Twelve) Hours.                 Discharge Diet:   Diet Instructions     Diet: Consistent Carbohydrate, Cardiac, Soft Texture; Pudding Thick Liquids; Ground       Discharge Diet:   Consistent Carbohydrate  Cardiac  Soft Texture      Fluid Consistency:  Pudding Thick Liquids   Soft Options:  Ground   Diet: ground, soft, pudding thick                 Discharge Care Plan / Instructions: Home health for continued speech therapy recommended however patient refuses.  ST rec: Medications should be taken crushed with puree. May have water and Ice between meals after oral care, under staff or family supervision and with the recommended strategies for safe swallowing. Upright for PO and small bites and sips. Oral care before breakfast, after all meals and PRN    Activity at Discharge:   Activity Instructions     Activity as Tolerated                   Follow-up Appointments: Dr MARINE Pollard 2 days    Test Results Pending at Discharge: CHRISTIANO panel     Plan discussed with Dr. Jose Manuel Joy.     Time spent in face-to-face evaluation, chart review, planning and education 45 minutes.    OSCAR Marlow  01/17/17  10:18 AM    I personally evaluated and examined the patient in conjunction with OSCAR Urrutia and agree with the assessment, treatment plan, and disposition of the patient as recorded by her. My history, exam, and further recommendations are:     His MRI was negative.  Neurology is seeing him and thinks that he has benign essential chorea vs progressive neurodegenerative condition.  This can be further worked up on an outpatient basis with [facility]    The patient is adamant that he only came to the hospital because his throat was sore and he was congested.  He is convinced that he has an upper respiratory infection and bronchitis.  He says his hoarseness is acute.  It persisted today.  We will treat him with oral  doxycycline and a Medrol Dosepak.  He also has dysphasia which was evaluated by speech therapy.  They recommended a modified diet.  He is unlikely to comply with this.  He does not think that he has a problem.  We offered outpatient speech therapy or home health to work on this.  He declined.  He believes once his throat gets better he will be better.    I explained to him that if his hoarseness persists and he continues to have symptoms of dysphagia that he needs a more aggressive outpatient workup for difficulty with swallowing and hoarseness.  I would like to try and conduct some of this on an inpatient basis, but he declines.  His hoarseness and dysphagia are obviously concerning as they accompany a significant smoking history.  He and I discussed this.  He tells me that he feels better and there is no reason that he can't go home today.  He says that he has numerous things to do.  He assures me that he will [facility].    Jose Manuel Joy DO  01/17/17  10:56 AM

## 2017-06-27 ENCOUNTER — APPOINTMENT (OUTPATIENT)
Dept: CT IMAGING | Facility: HOSPITAL | Age: 77
End: 2017-06-27

## 2017-06-27 ENCOUNTER — HOSPITAL ENCOUNTER (EMERGENCY)
Facility: HOSPITAL | Age: 77
Discharge: HOME | End: 2017-06-27
Attending: EMERGENCY MEDICINE | Admitting: EMERGENCY MEDICINE

## 2017-06-27 VITALS
BODY MASS INDEX: 23.8 KG/M2 | DIASTOLIC BLOOD PRESSURE: 83 MMHG | TEMPERATURE: 98 F | HEART RATE: 63 BPM | OXYGEN SATURATION: 100 % | HEIGHT: 71 IN | WEIGHT: 170 LBS | SYSTOLIC BLOOD PRESSURE: 143 MMHG | RESPIRATION RATE: 16 BRPM

## 2017-06-27 DIAGNOSIS — R20.2 PARESTHESIAS: Primary | ICD-10-CM

## 2017-06-27 DIAGNOSIS — M54.2 NECK PAIN: ICD-10-CM

## 2017-06-27 LAB
ACANTHOCYTES BLD QL SMEAR: NORMAL
ALBUMIN SERPL-MCNC: 4.5 G/DL (ref 3.5–5)
ALBUMIN/GLOB SERPL: 1.3 G/DL (ref 1.1–2.5)
ALP SERPL-CCNC: 53 U/L (ref 24–120)
ALT SERPL W P-5'-P-CCNC: 39 U/L (ref 0–54)
ANION GAP SERPL CALCULATED.3IONS-SCNC: 12 MMOL/L (ref 4–13)
APTT PPP: 32.1 SECONDS (ref 24.1–34.8)
AST SERPL-CCNC: 26 U/L (ref 7–45)
BASOPHILS # BLD AUTO: 0.01 10*3/MM3 (ref 0–0.2)
BASOPHILS NFR BLD AUTO: 0.2 % (ref 0–2)
BILIRUB SERPL-MCNC: 0.9 MG/DL (ref 0.1–1)
BUN BLD-MCNC: 18 MG/DL (ref 5–21)
BUN/CREAT SERPL: 15 (ref 7–25)
BURR CELLS BLD QL SMEAR: NORMAL
CALCIUM SPEC-SCNC: 9.8 MG/DL (ref 8.4–10.4)
CHLORIDE SERPL-SCNC: 103 MMOL/L (ref 98–110)
CO2 SERPL-SCNC: 25 MMOL/L (ref 24–31)
CREAT BLD-MCNC: 1.2 MG/DL (ref 0.5–1.4)
DEPRECATED RDW RBC AUTO: 43.9 FL (ref 40–54)
ELLIPTOCYTES BLD QL SMEAR: NORMAL
EOSINOPHIL # BLD AUTO: 0.01 10*3/MM3 (ref 0–0.7)
EOSINOPHIL NFR BLD AUTO: 0.2 % (ref 0–4)
ERYTHROCYTE [DISTWIDTH] IN BLOOD BY AUTOMATED COUNT: 14.1 % (ref 12–15)
GFR SERPL CREATININE-BSD FRML MDRD: 59 ML/MIN/1.73
GLOBULIN UR ELPH-MCNC: 3.5 GM/DL
GLUCOSE BLD-MCNC: 99 MG/DL (ref 70–100)
HCT VFR BLD AUTO: 41.9 % (ref 40–52)
HGB BLD-MCNC: 14.5 G/DL (ref 14–18)
IMM GRANULOCYTES # BLD: 0.06 10*3/MM3 (ref 0–0.03)
IMM GRANULOCYTES NFR BLD: 1 % (ref 0–5)
INR PPP: 1.07 (ref 0.91–1.09)
LYMPHOCYTES # BLD AUTO: 1.34 10*3/MM3 (ref 0.72–4.86)
LYMPHOCYTES NFR BLD AUTO: 22.8 % (ref 15–45)
MCH RBC QN AUTO: 29.5 PG (ref 28–32)
MCHC RBC AUTO-ENTMCNC: 34.6 G/DL (ref 33–36)
MCV RBC AUTO: 85.2 FL (ref 82–95)
MONOCYTES # BLD AUTO: 2.46 10*3/MM3 (ref 0.19–1.3)
MONOCYTES NFR BLD AUTO: 41.8 % (ref 4–12)
NEUTROPHILS # BLD AUTO: 2 10*3/MM3 (ref 1.87–8.4)
NEUTROPHILS NFR BLD AUTO: 34 % (ref 39–78)
PLAT MORPH BLD: NORMAL
PLATELET # BLD AUTO: 127 10*3/MM3 (ref 130–400)
PMV BLD AUTO: 10.5 FL (ref 6–12)
POIKILOCYTOSIS BLD QL SMEAR: NORMAL
POTASSIUM BLD-SCNC: 4.5 MMOL/L (ref 3.5–5.3)
PROT SERPL-MCNC: 8 G/DL (ref 6.3–8.7)
PROTHROMBIN TIME: 14.2 SECONDS (ref 11.9–14.6)
RBC # BLD AUTO: 4.92 10*6/MM3 (ref 4.8–5.9)
SODIUM BLD-SCNC: 140 MMOL/L (ref 135–145)
WBC MORPH BLD: NORMAL
WBC NRBC COR # BLD: 5.88 10*3/MM3 (ref 4.8–10.8)

## 2017-06-27 PROCEDURE — 99284 EMERGENCY DEPT VISIT MOD MDM: CPT | Performed by: PSYCHIATRY & NEUROLOGY

## 2017-06-27 PROCEDURE — 85610 PROTHROMBIN TIME: CPT | Performed by: EMERGENCY MEDICINE

## 2017-06-27 PROCEDURE — 85025 COMPLETE CBC W/AUTO DIFF WBC: CPT | Performed by: EMERGENCY MEDICINE

## 2017-06-27 PROCEDURE — 72125 CT NECK SPINE W/O DYE: CPT

## 2017-06-27 PROCEDURE — 85730 THROMBOPLASTIN TIME PARTIAL: CPT | Performed by: EMERGENCY MEDICINE

## 2017-06-27 PROCEDURE — 80053 COMPREHEN METABOLIC PANEL: CPT | Performed by: EMERGENCY MEDICINE

## 2017-06-27 PROCEDURE — 85007 BL SMEAR W/DIFF WBC COUNT: CPT | Performed by: EMERGENCY MEDICINE

## 2017-06-27 PROCEDURE — 36415 COLL VENOUS BLD VENIPUNCTURE: CPT

## 2017-06-27 PROCEDURE — 70450 CT HEAD/BRAIN W/O DYE: CPT

## 2017-06-27 PROCEDURE — 99284 EMERGENCY DEPT VISIT MOD MDM: CPT

## 2017-06-27 NOTE — CONSULTS
Neurology Consult Note    Referring Provider: Dr. Socorro Masterson  Reason for Consultation: paresthesias      History of present illness:      76-year-old male who presents with full body paresthesias.  This morning he was laying in his recliner when he heard a pop in his neck.  He felt paresthesias from the tip of his head to the tip of his feet bilaterally.  He denies weakness.  He initially had some mild neck pain although that resolved quickly.  He denies bowel or bladder incontinence.  He denies vision changes.  He called EMS and was transferred to the emergency department.  Here is paresthesias have almost completely resolved.  He does have some residual paresthesias but no numbness in his bilateral face, arms, and legs.  On exam he is found to have some dysarthric speech and atypical movements.  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  He was offered workup for this at that point in time.  An MR of the brain was performed and negative.  Also basic lab work was done.  These were unremarkable.  He refused outpatient follow-up.  He currently denies any neck pain or back pain.  He denies any weakness.  He denies any dis coordination.  Past Medical History:   Diagnosis Date   • Atypical chest pain    • Hypertension    • Sciatica        No Known Allergies  No current facility-administered medications on file prior to encounter.      Current Outpatient Prescriptions on File Prior to Encounter   Medication Sig   • aspirin 81 MG EC tablet Take 81 mg by mouth Daily.   • lisinopril (PRINIVIL,ZESTRIL) 10 MG tablet Take 5 mg by mouth Daily.   • MethylPREDNISolone (MEDROL, OWEN,) 4 MG tablet Take as directed on package instructions.   • pseudoephedrine-guaifenesin (MUCINEX D)  MG per 12 hr tablet Take 1 tablet by mouth Every 12 (Twelve) Hours.       Social History     Social History    • Marital status: Single     Spouse name: N/A   • Number of children: N/A   • Years of education: N/A     Occupational History   • Not on file.     Social History Main Topics   • Smoking status: Former Smoker     Packs/day: 1.00     Years: 30.00   • Smokeless tobacco: Not on file   • Alcohol use No   • Drug use: No   • Sexual activity: Defer     Other Topics Concern   • Not on file     Social History Narrative     Family History   Problem Relation Age of Onset   • Cancer Mother    • No Known Problems Father    • No Known Problems Sister    • No Known Problems Brother    • No Known Problems Sister    • No Known Problems Brother        Review of Systems  A 14 point review of systems was reviewed and was negative except for paresthesias    Vital Signs   Temp:  [98.5 °F (36.9 °C)] 98.5 °F (36.9 °C)  Heart Rate:  [54-77] 54  Resp:  [20] 20  BP: (146-157)/(79-99) 150/95    General Exam:  Head:  Normal cephalic, atraumatic  HEENT:  Neck supple  Fundoscopic Exam:  No signs of disc edema  CVS:  Regular rate and rhythm.  No murmurs  Carotid Examination:  No bruits  Lungs:  Clear to auscultation  Abdomen:  Non-tender, Non-distended  Extremities:  No signs of peripheral edema  Skin:  No rashes    Neurologic Exam:    Mental Status:    -Awake, Alert, Oriented X 3  -No word finding difficulties  -No aphasia  -Dysarthric speech; however, he states that he does not have his dentures in the bottom and they are loosefitting in the top of examining him and atypical speech.  -Follows simple and complex commands    CN II:  Visual fields full.  Pupils equally reactive to light  CN III, IV, VI:  Extraocular Muscles full with no signs of nystagmus  CN V:  Facial sensory is symmetric with no asymetries.  CN VII:  Facial motor symmetric  CN VIII:  Gross hearing intact bilaterally  CN IX:  Palate elevates symmetrically  CN X:  Palate elevates symmetrically  CN XI:  Shoulder shrug symmetric  CN XII:  Tongue is midline on protrusion    Motor:  (strength out of 5:  1= minimal movement, 2 = movement in plane of gravity, 3 = movement against gravity, 4 = movement against some resistance, 5 = full strength)    -Right Upper Ext: Proximal: 5 Distal: 5  -Left Upper Ext: Proximal: 5 Distal: 5    -Right Lower Ext: Proximal: 5 Distal: 5  -Left Lower Ext: Proximal: 5 Distal: 5    He does have some mild cogwheel rigidity    DTR:  -Right   Bicep: 2+ Tricep: 2+ Brachoradialis: 2+   Patella: 2+ Ankle: 2+ Neg Babinski  -Left   Bicep: 2+ Tricep: 2+ Brachoradialis: 2+   Patella: 2+ Ankle: 2+ Neg Babinski    Sensory:  -Intact to light touch, pinprick, temperature, pain, and proprioception    Coordination:  -Finger to nose intact  -Heel to shin intact  -No ataxia  --Very subtle choreic movements in the bilateral upper extreme ease.  No active tremors.    Gait  -No signs of ataxia  -ambulates unassisted      Results Review:  Lab Results (last 24 hours)     Procedure Component Value Units Date/Time    Comprehensive Metabolic Panel [[id number]]  (Abnormal) Collected:  06/27/17 1129    Specimen:  Blood Updated:  06/27/17 1150     Glucose 99 mg/dL      BUN 18 mg/dL      Creatinine 1.20 mg/dL      Sodium 140 mmol/L      Potassium 4.5 mmol/L      Chloride 103 mmol/L      CO2 25.0 mmol/L      Calcium 9.8 mg/dL      Total Protein 8.0 g/dL      Albumin 4.50 g/dL      ALT (SGPT) 39 U/L      AST (SGOT) 26 U/L      Alkaline Phosphatase 53 U/L      Total Bilirubin 0.9 mg/dL      eGFR Non African Amer 59 (L) mL/min/1.73      Globulin 3.5 gm/dL      A/G Ratio 1.3 g/dL      BUN/Creatinine Ratio 15.0     Anion Gap 12.0 mmol/L     Narrative:       The MDRD GFR formula is only valid for adults with stable renal function between ages 18 and 70.    CBC Auto Differential [[id number]]  (Abnormal) Collected:  06/27/17 1129    Specimen:  Blood Updated:  06/27/17 1210     WBC 5.88 10*3/mm3      RBC 4.92 10*6/mm3      Hemoglobin 14.5 g/dL      Hematocrit 41.9 %      MCV 85.2 fL      MCH 29.5 pg      MCHC  34.6 g/dL      RDW 14.1 %      RDW-SD 43.9 fl      MPV 10.5 fL      Platelets 127 (L) 10*3/mm3      Neutrophil % 34.0 (L) %      Lymphocyte % 22.8 %      Monocyte % 41.8 (H) %      Eosinophil % 0.2 %      Basophil % 0.2 %      Immature Grans % 1.0 %      Neutrophils, Absolute 2.00 10*3/mm3      Lymphocytes, Absolute 1.34 10*3/mm3      Monocytes, Absolute 2.46 (H) 10*3/mm3      Eosinophils, Absolute 0.01 10*3/mm3      Basophils, Absolute 0.01 10*3/mm3      Immature Grans, Absolute 0.06 (H) 10*3/mm3     CBC & Differential [[id number]] Collected:  06/27/17 1129    Specimen:  Blood Updated:  06/27/17 1210    Narrative:       The following orders were created for panel order CBC & Differential.  Procedure                               Abnormality         Status                     ---------                               -----------         ------                     Scan Slide[[id number]]                                       Final result               CBC Auto Differential[[id number]]         Abnormal            Final result                 Please view results for these tests on the individual orders.    Scan Slide [[id number]] Collected:  06/27/17 1129    Specimen:  Blood Updated:  06/27/17 1210     Acanthocytes Slight/1+     Crenated RBC's Slight/1+     Elliptocytes Slight/1+     Poikilocytes Slight/1+     WBC Morphology Normal     Platelet Morphology Normal    Protime-INR [[id number]]  (Normal) Collected:  06/27/17 1329    Specimen:  Blood from Arm, Left Updated:  06/27/17 1349     Protime 14.2 Seconds      INR 1.07    aPTT [[id number]]  (Normal) Collected:  06/27/17 1329    Specimen:  Blood from Arm, Left Updated:  06/27/17 1349     PTT 32.1 seconds           .  Imaging Results (last 24 hours)     Procedure Component Value Units Date/Time    CT Head Without Contrast [[id number]] Collected:  06/27/17 1213     Updated:  06/27/17 1218    Narrative:       EXAMINATION:  CT HEAD WO CONTRAST-  6/27/2017 10:59 CST     HISTORY:  Weakness and tingling. Rule out stroke.     TECHNIQUE: Multiple axial images were obtained through the brain without  contrast infusion. Multiplanar images were reconstructed.     DLP: 634 mGy-cm. Automated dosage control was utilized.     COMPARISON: 01/15/2017.     FINDINGS: There are no hemorrhage, edema or mass effect. There is mild  atrophy. The ventricular system is nondilated. The visualized paranasal  sinuses and mastoid air cells are clear. No calvarial fracture is seen.  There has been prior plate fixation along the roof and lateral wall of  the left orbit.       Impression:       1. No hemorrhage, edema or mass effect. No acute findings.  2. Mild atrophy.     The full report of this exam was immediately signed and available to the  emergency room. The patient is currently in the emergency room.        This report was finalized on 06/27/2017 12:15 by Dr. Good Carrillo MD.    CT Cervical Spine Without Contrast [[id number]] Collected:  06/27/17 1215     Updated:  06/27/17 1222    Narrative:       EXAMINATION:  CT CERVICAL SPINE WO CONTRAST-  6/27/2017 10:59 CST     HISTORY: Weakness and tingling. No trauma.     TECHNIQUE: Spiral CT was performed of the cervical spine. Sagittal and  coronal images were reconstructed.     DLP: 308 mGy-cm. Automated dosage control was utilized.     COMPARISON: No comparison study.     FINDINGS: No cervical spine fracture is identified.     At C2-3, there is disc narrowing with minimal spondylosis. There is no  significant spinal or foraminal narrowing.     At C3-4, there is disc narrowing with diffuse spondylitic and uncinate  spurring. The uncinate spurring is greater on the right side. There is  severe right-sided foraminal stenosis. There is mild to moderate central  spinal canal narrowing due to spondylitic and uncinate spurring. This is  greater on the right side.     At C4-5, there is disc narrowing with spondylitic and uncinate spurring.  There is mild foraminal narrowing  bilaterally at this level. There is  only minimal central spinal canal narrowing.     At C5-6, there is mild disc narrowing. There is minimal spondylosis.  There is no significant spinal or foraminal narrowing.     At C6/7, there is disc narrowing with spondylitic and uncinate spurring.  There is moderate bilateral foraminal narrowing. There is no significant  central spinal canal stenosis.     At C7-T1, there is facet arthropathy right greater than left. There is  no significant spinal or foraminal stenosis.       Impression:       1. No evidence of cervical spine fracture.  2. Degenerative changes, as described above.  3. 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     The full report of this exam was immediately signed and available to the  emergency room. The patient is currently in the emergency room.     This report was finalized on 06/27/2017 12:19 by Dr. Good Carrillo MD.              Impression    1.  Full body paresthesias  2.  Benign movement disorder    Plan    Overall the patient has a normal neurologic exam.  His history of full body paresthesias after a neck pop would not localize well.  There would be no pathology occurring in the cervical spine that could cause facial paresthesias.  This is more likely consistent with an anxiety disorder and possibly some hyperventilation causing a low CO2 which could in turn cause full body paresthesias.  He has no neurologic deficits on examination.  There is no evidence of a spinal level on examination.  At this point in time no further neurologic workup is suggested.  He is instructed to go home and rest.  If any progression occurs he is to return immediately.  In regards to his movement disorder he refuses further outpatient neurology workup.    I discussed the patients findings and my recommendations with patient and consulting provider    Edgar Mccord MD  06/27/17  1:53 PM

## 2017-06-27 NOTE — ED PROVIDER NOTES
Subjective numbness and tingling all over  History of Present Illness  The pt presents today with complaint of numbness and tingling.   He states the symptoms started following his popping his neck.  He tells me that following that he developed neck pain.  Since that time he also has had numbness and tingling all over his body.  The patient has some kind of movement disorder that he cannot characterize.  He decided he would come in today because he was concerned that he had had a stroke.  He thereby decided to come to the ER for evaluation. He denies that he has ever had any similar symptoms to this in the past.  He has not had any fevers chills or diffuse arthralgias or myalgias.  Review of Systems   Constitutional: Negative.    HENT: Negative.    Eyes: Negative.    Respiratory: Negative.    Cardiovascular: Negative.    Gastrointestinal: Negative.    Endocrine: Negative.    Genitourinary: Negative.    Musculoskeletal: Negative.    Skin: Negative.    Allergic/Immunologic: Negative.    Neurological: Positive for numbness.   Hematological: Negative.    Psychiatric/Behavioral: Negative.    All other systems reviewed and are negative.      Past Medical History:   Diagnosis Date   • Atypical chest pain    • Hypertension    • Sciatica        No Known Allergies    Past Surgical History:   Procedure Laterality Date   • CALCANEOUS OPEN REDUCTION INTERNAL FIXATION Bilateral    • CHOLECYSTECTOMY     • HERNIA REPAIR         Family History   Problem Relation Age of Onset   • Cancer Mother    • No Known Problems Father    • No Known Problems Sister    • No Known Problems Brother    • No Known Problems Sister    • No Known Problems Brother        Social History     Social History   • Marital status: Single     Spouse name: N/A   • Number of children: N/A   • Years of education: N/A     Social History Main Topics   • Smoking status: Former Smoker     Packs/day: 1.00     Years: 30.00   • Smokeless tobacco: None   • Alcohol use No    • Drug use: No   • Sexual activity: Defer     Other Topics Concern   • None     Social History Narrative           Objective   Physical Exam   Constitutional: He is oriented to person, place, and time. He appears well-developed and well-nourished.   HENT:   Head: Normocephalic and atraumatic.   Right Ear: External ear normal.   Left Ear: External ear normal.   Nose: Nose normal.   Mouth/Throat: Oropharynx is clear and moist.   Eyes: Conjunctivae and EOM are normal. Pupils are equal, round, and reactive to light. Right eye exhibits no discharge. Left eye exhibits no discharge.   Neck: Normal range of motion. Neck supple. No thyromegaly present.   Cardiovascular: Normal rate, regular rhythm, normal heart sounds and intact distal pulses.  Exam reveals no friction rub.    No murmur heard.  Pulmonary/Chest: Effort normal and breath sounds normal. No respiratory distress.   Abdominal: Soft. Bowel sounds are normal. He exhibits no distension. There is no tenderness.   Musculoskeletal: Normal range of motion. He exhibits no edema or deformity.   Neurological: He is alert and oriented to person, place, and time. He has normal reflexes. No cranial nerve deficit.   Movement disorder question chorea   Skin: Skin is warm and dry. No rash noted.   Psychiatric: Judgment normal.   Nursing note and vitals reviewed.      Procedures         ED Course  ED Course   Comment By Time   The patient has been apprised of the findings.  I also spoke with Dr. Easley who declined to admit the patient this time because he felt that he needed the MRI MRI was not available he preferred that neuro see the patient first.  Dr. Mccord has come to see the patient and feels that he does need MRI at this point he has not feel that this is a neurological issue.  He feels that it was anxiety related.  I did discuss the findings with the patient as well as what Dr. Mccord discussed with me.  He will comfortable going home and tells me that he will  return if there are any further problems.  Patient will be discharged to home in good condition care of his brother. Elsa Masterson MD 06/27 1512                  MDM  Number of Diagnoses or Management Options  Neck pain: new and requires workup  Paresthesias: new and requires workup     Amount and/or Complexity of Data Reviewed  Clinical lab tests: ordered and reviewed  Tests in the radiology section of CPT®: ordered and reviewed  Discuss the patient with other providers: yes    Risk of Complications, Morbidity, and/or Mortality  Presenting problems: high  Diagnostic procedures: high  Management options: high    Patient Progress  Patient progress: stable      Final diagnoses:   Paresthesias   Neck pain            Elsa Masterson MD  06/27/17 1516

## 2017-06-27 NOTE — DISCHARGE INSTRUCTIONS
Recommend you follow with your own primary care physician of course if your symptoms worsen or you are unable to follow-up please feel free to return to the emergency room at any time.

## 2017-08-11 ENCOUNTER — HOSPITAL ENCOUNTER (EMERGENCY)
Facility: HOSPITAL | Age: 77
Discharge: HOME | End: 2017-08-11
Admitting: EMERGENCY MEDICINE

## 2017-08-11 ENCOUNTER — APPOINTMENT (OUTPATIENT)
Dept: CT IMAGING | Facility: HOSPITAL | Age: 77
End: 2017-08-11

## 2017-08-11 VITALS
HEART RATE: 65 BPM | TEMPERATURE: 98.9 F | HEIGHT: 71 IN | DIASTOLIC BLOOD PRESSURE: 97 MMHG | WEIGHT: 175 LBS | OXYGEN SATURATION: 96 % | SYSTOLIC BLOOD PRESSURE: 121 MMHG | RESPIRATION RATE: 16 BRPM | BODY MASS INDEX: 24.5 KG/M2

## 2017-08-11 DIAGNOSIS — I71.43 ANEURYSM OF INFRARENAL ABDOMINAL AORTA (HCC): ICD-10-CM

## 2017-08-11 DIAGNOSIS — R20.2 PARESTHESIA: ICD-10-CM

## 2017-08-11 DIAGNOSIS — D72.821 MONOCYTOSIS: Primary | ICD-10-CM

## 2017-08-11 LAB
ACANTHOCYTES BLD QL SMEAR: ABNORMAL
ALBUMIN SERPL-MCNC: 4.5 G/DL (ref 3.5–5)
ALBUMIN/GLOB SERPL: 1.3 G/DL (ref 1.1–2.5)
ALP SERPL-CCNC: 51 U/L (ref 24–120)
ALT SERPL W P-5'-P-CCNC: 41 U/L (ref 0–54)
AMPHET+METHAMPHET UR QL: NEGATIVE
ANION GAP SERPL CALCULATED.3IONS-SCNC: 10 MMOL/L (ref 4–13)
APTT PPP: 33.1 SECONDS (ref 24.1–34.8)
AST SERPL-CCNC: 26 U/L (ref 7–45)
BARBITURATES UR QL SCN: NEGATIVE
BENZODIAZ UR QL SCN: NEGATIVE
BILIRUB SERPL-MCNC: 0.7 MG/DL (ref 0.1–1)
BILIRUB UR QL STRIP: NEGATIVE
BUN BLD-MCNC: 20 MG/DL (ref 5–21)
BUN/CREAT SERPL: 19 (ref 7–25)
BURR CELLS BLD QL SMEAR: ABNORMAL
CALCIUM SPEC-SCNC: 9.3 MG/DL (ref 8.4–10.4)
CANNABINOIDS SERPL QL: NEGATIVE
CHLORIDE SERPL-SCNC: 105 MMOL/L (ref 98–110)
CLARITY UR: CLEAR
CO2 SERPL-SCNC: 24 MMOL/L (ref 24–31)
COCAINE UR QL: NEGATIVE
COLOR UR: YELLOW
CREAT BLD-MCNC: 1.05 MG/DL (ref 0.5–1.4)
CRP SERPL-MCNC: <0.5 MG/DL (ref 0–0.99)
CYTOLOGIST CVX/VAG CYTO: NORMAL
DEPRECATED RDW RBC AUTO: 45.4 FL (ref 40–54)
ERYTHROCYTE [DISTWIDTH] IN BLOOD BY AUTOMATED COUNT: 14.2 % (ref 12–15)
ERYTHROCYTE [SEDIMENTATION RATE] IN BLOOD: <1 MM/HR (ref 0–15)
GFR SERPL CREATININE-BSD FRML MDRD: 69 ML/MIN/1.73
GLOBULIN UR ELPH-MCNC: 3.4 GM/DL
GLUCOSE BLD-MCNC: 96 MG/DL (ref 70–100)
GLUCOSE UR STRIP-MCNC: NEGATIVE MG/DL
HCT VFR BLD AUTO: 41.1 % (ref 40–52)
HGB BLD-MCNC: 14 G/DL (ref 14–18)
HGB UR QL STRIP.AUTO: NEGATIVE
INR PPP: 1.05 (ref 0.91–1.09)
KETONES UR QL STRIP: NEGATIVE
LEUKOCYTE ESTERASE UR QL STRIP.AUTO: NEGATIVE
LYMPHOCYTES # BLD MANUAL: 1.25 10*3/MM3 (ref 0.72–4.86)
LYMPHOCYTES NFR BLD MANUAL: 25 % (ref 15–45)
LYMPHOCYTES NFR BLD MANUAL: 39 % (ref 4–12)
MCH RBC QN AUTO: 29.7 PG (ref 28–32)
MCHC RBC AUTO-ENTMCNC: 34.1 G/DL (ref 33–36)
MCV RBC AUTO: 87.1 FL (ref 82–95)
METHADONE UR QL SCN: NEGATIVE
MONOCYTES # BLD AUTO: 1.94 10*3/MM3 (ref 0.19–1.3)
NEUTROPHILS # BLD AUTO: 1.79 10*3/MM3 (ref 1.87–8.4)
NEUTROPHILS NFR BLD MANUAL: 34 % (ref 39–78)
NEUTS BAND NFR BLD MANUAL: 2 % (ref 0–10)
NITRITE UR QL STRIP: NEGATIVE
OPIATES UR QL: NEGATIVE
PATH INTERP BLD-IMP: NORMAL
PCP UR QL SCN: NEGATIVE
PH UR STRIP.AUTO: 5.5 [PH] (ref 5–8)
PLATELET # BLD AUTO: 124 10*3/MM3 (ref 130–400)
PMV BLD AUTO: 10.7 FL (ref 6–12)
POTASSIUM BLD-SCNC: 4.5 MMOL/L (ref 3.5–5.3)
PROT SERPL-MCNC: 7.9 G/DL (ref 6.3–8.7)
PROT UR QL STRIP: NEGATIVE
PROTHROMBIN TIME: 14 SECONDS (ref 11.9–14.6)
RBC # BLD AUTO: 4.72 10*6/MM3 (ref 4.8–5.9)
SCAN SLIDE: NORMAL
SMALL PLATELETS BLD QL SMEAR: ABNORMAL
SODIUM BLD-SCNC: 139 MMOL/L (ref 135–145)
SP GR UR STRIP: 1.01 (ref 1–1.03)
UROBILINOGEN UR QL STRIP: NORMAL
WBC MORPH BLD: NORMAL
WBC NRBC COR # BLD: 4.98 10*3/MM3 (ref 4.8–10.8)

## 2017-08-11 PROCEDURE — 72128 CT CHEST SPINE W/O DYE: CPT

## 2017-08-11 PROCEDURE — 85025 COMPLETE CBC W/AUTO DIFF WBC: CPT | Performed by: NURSE PRACTITIONER

## 2017-08-11 PROCEDURE — 80053 COMPREHEN METABOLIC PANEL: CPT | Performed by: NURSE PRACTITIONER

## 2017-08-11 PROCEDURE — 80307 DRUG TEST PRSMV CHEM ANLYZR: CPT | Performed by: NURSE PRACTITIONER

## 2017-08-11 PROCEDURE — 93005 ELECTROCARDIOGRAM TRACING: CPT | Performed by: NURSE PRACTITIONER

## 2017-08-11 PROCEDURE — 86140 C-REACTIVE PROTEIN: CPT | Performed by: NURSE PRACTITIONER

## 2017-08-11 PROCEDURE — 93010 ELECTROCARDIOGRAM REPORT: CPT | Performed by: INTERNAL MEDICINE

## 2017-08-11 PROCEDURE — 81003 URINALYSIS AUTO W/O SCOPE: CPT | Performed by: NURSE PRACTITIONER

## 2017-08-11 PROCEDURE — 25010000002 LORAZEPAM PER 2 MG: Performed by: NURSE PRACTITIONER

## 2017-08-11 PROCEDURE — 85730 THROMBOPLASTIN TIME PARTIAL: CPT | Performed by: NURSE PRACTITIONER

## 2017-08-11 PROCEDURE — 96374 THER/PROPH/DIAG INJ IV PUSH: CPT

## 2017-08-11 PROCEDURE — 72131 CT LUMBAR SPINE W/O DYE: CPT

## 2017-08-11 PROCEDURE — 85060 BLOOD SMEAR INTERPRETATION: CPT | Performed by: NURSE PRACTITIONER

## 2017-08-11 PROCEDURE — 85651 RBC SED RATE NONAUTOMATED: CPT | Performed by: NURSE PRACTITIONER

## 2017-08-11 PROCEDURE — 85007 BL SMEAR W/DIFF WBC COUNT: CPT | Performed by: NURSE PRACTITIONER

## 2017-08-11 PROCEDURE — 85610 PROTHROMBIN TIME: CPT | Performed by: NURSE PRACTITIONER

## 2017-08-11 PROCEDURE — 99284 EMERGENCY DEPT VISIT MOD MDM: CPT

## 2017-08-11 RX ORDER — LORAZEPAM 2 MG/ML
0.5 INJECTION INTRAMUSCULAR ONCE
Status: COMPLETED | OUTPATIENT
Start: 2017-08-11 | End: 2017-08-11

## 2017-08-11 RX ORDER — SODIUM CHLORIDE 0.9 % (FLUSH) 0.9 %
10 SYRINGE (ML) INJECTION AS NEEDED
Status: DISCONTINUED | OUTPATIENT
Start: 2017-08-11 | End: 2017-08-11 | Stop reason: HOSPADM

## 2017-08-11 RX ADMIN — SODIUM CHLORIDE 500 ML: 9 INJECTION, SOLUTION INTRAVENOUS at 09:37

## 2017-08-11 RX ADMIN — LORAZEPAM 0.5 MG: 2 INJECTION INTRAMUSCULAR; INTRAVENOUS at 09:50

## 2017-08-11 NOTE — ED PROVIDER NOTES
"Subjective   HPI Comments: Patient is a 76-year-old  male who presents ER today with complaint of tingling.  The patient denies any pain.  He reports that his last known well time was last evening before bedtime.  The patient reports that he woke up this morning and had tingling the waist to the top of his head.  He should states that the tingling starts in his low back and radiates up to his head.  The patient denies any involvement of the lower extremities although he does states that he can feel it in the bottom of his feet.  The patient describes this as a feeling such as \"when my hand while sleeping that starts like a \".  He also reports a tingling feeling to his bilateral feet.  The patient denies any injury.  He denies any pain.  The patient specifically denies any chest or abdominal, he denies any abdominal pain.  Denies any known injury.  Patient reports that he has recently started when the triceps strength area states he has not injured himself doing so.  The patient denies any loss of bowel or bladder control, he denies any saddle anesthesias.  Patient was seen at this ER at the end of June similar symptoms after his neck pop.  Patient had a CT scan of the cervical spine of the head was normal.  He was seen by neurology at that time and was diagnosed with diabetes.  He reports that his symptoms have did resolve after that time.  He states that when he woke up this morning they have returned.  He presents ER today for further evaluation.  She does have a history of a movement disorder which she is unable to characterize.  He states \"I have always been this way\".    Patient is a 76 y.o. male presenting with neurologic complaint.   History provided by:  Patient   used: No    Neurologic Problem   The patient's primary symptoms include focal sensory loss. The patient's pertinent negatives include no altered mental status, clumsiness, focal weakness, loss of balance, memory loss, " near-syncope, slurred speech, syncope, visual change or weakness. This is a recurrent problem. The current episode started today. The neurological problem developed suddenly. Last Known Well Instant: last evening.  The problem is unchanged. Affected Side: from jo hips to top of head (tingling), bottoms of feet. Pertinent negatives include no abdominal pain, auditory change, aura, back pain, bladder incontinence, bowel incontinence, chest pain, confusion, diaphoresis, dizziness, fatigue, fever, headaches, light-headedness, nausea, neck pain, palpitations, shortness of breath, vertigo or vomiting. Past treatments include nothing. There is no history of a bleeding disorder, a clotting disorder, a CVA, dementia, head trauma, liver disease, mood changes or seizures.       Review of Systems   Constitutional: Negative for diaphoresis, fatigue and fever.   Respiratory: Negative for shortness of breath.    Cardiovascular: Negative for chest pain, palpitations and near-syncope.   Gastrointestinal: Negative for abdominal pain, bowel incontinence, nausea and vomiting.   Genitourinary: Negative for bladder incontinence.   Musculoskeletal: Negative for back pain and neck pain.   Neurological: Negative for dizziness, vertigo, focal weakness, syncope, weakness, light-headedness, headaches and loss of balance.   Psychiatric/Behavioral: Negative for confusion and memory loss.   All other systems reviewed and are negative.      Past Medical History:   Diagnosis Date   • Atypical chest pain    • Hypertension    • Sciatica        No Known Allergies    Past Surgical History:   Procedure Laterality Date   • CALCANEOUS OPEN REDUCTION INTERNAL FIXATION Bilateral    • CHOLECYSTECTOMY     • HERNIA REPAIR         Family History   Problem Relation Age of Onset   • Cancer Mother    • No Known Problems Father    • No Known Problems Sister    • No Known Problems Brother    • No Known Problems Sister    • No Known Problems Brother        Social  History     Social History   • Marital status: Single     Spouse name: N/A   • Number of children: N/A   • Years of education: N/A     Social History Main Topics   • Smoking status: Former Smoker     Packs/day: 1.00     Years: 30.00   • Smokeless tobacco: None   • Alcohol use No   • Drug use: No   • Sexual activity: Defer     Other Topics Concern   • None     Social History Narrative           Objective   Physical Exam   Constitutional: He is oriented to person, place, and time.   Neurological: He is alert and oriented to person, place, and time. He has normal strength. No cranial nerve deficit or sensory deficit. GCS eye subscore is 4. GCS verbal subscore is 5. GCS motor subscore is 6.       Procedures         ED Course  ED Course   Comment By Time   Patient's labs reviewed at this time.  The patient's CBC shows a monocytosis.  He has had this his previous CBCs in the past for the past year.  The patient's CT scans were reviewed as well.  There have of present patient of these findings.  Advised him that he will need to follow-up with his primary care provider for further evaluation of this.  The patient was advised that he does have some.  Deformities of L1 and L2 that do appear to be chronic.  I advised him that he does have significant degenerative disc disease with some mild spinal canal stenosis.  I will also advise patient if he does have an infrarenal abdominal aortic aneurysm with a maximum diameter 3.1 cm.  Advised again to follow up his primary care for this. Airam Smith, APRN 08/11 3997   Patient has not had any back pain abdominal pain or chest pain since his arrival to the ER today.  The patient was also advised of the findings of the thoracic CT scan including that he may have an old right rib fracture.  Patient reports that his symptoms have mostly resolved after having the Ativan.  He previously was diagnosed with anxiety reaction with similar symptoms approximately one month ago at this ER.   At this time patient will be discharged home in stable condition.  He is in no distress.  The patient has 2+ pulses.  Sensation is intact.  Neurological exam at this time.  Time the patient will be discharged home in stable condition with his brother to drive him home. Airam ROGERS Smith, APRN 08/11 1259   Case discussed with Dr. España who agrees with the plan of care. Airam ROGERS Smith, APRN 08/11 1304        CT Thoracic Spine Without Contrast   Final Result   Impression:   No acute osseous injury of the thoracic spine.           This report was finalized on 08/11/2017 11:49 by  Vin Medrano, .      CT Lumbar Spine Without Contrast   Final Result   Impression:    1. No evidence of acute osseous injury in the lumbar spine.   2. Superior endplate deformities of L1 and L2 appear to be chronic and   degenerative in nature.   3. Degenerative disc disease at L4-L5 results in mild spinal canal   stenosis. Neuroforaminal narrowing is noted throughout the lumbar spine.   4. Infrarenal abdominal aortic aneurysm, with maximum diameter measuring   3.1 cm..           This report was finalized on 08/11/2017 10:50 by Dr. Sinan Manriquez MD.        Lab Results (last 24 hours)     Procedure Component Value Units Date/Time    CBC & Differential [[id number]] Collected:  08/11/17 0934    Specimen:  Blood Updated:  08/11/17 1132    Narrative:       The following orders were created for panel order CBC & Differential.  Procedure                               Abnormality         Status                     ---------                               -----------         ------                     Manual Differential[[id number]]          Abnormal            Final result               Scan Slide[[id number]]                                       Final result               CBC Auto Differential[[id number]]        Abnormal            Final result               Slide Review, Hematology[[id number]]                         Final result                 Please  view results for these tests on the individual orders.    Comprehensive Metabolic Panel [[id number]] Collected:  08/11/17 0934    Specimen:  Blood Updated:  08/11/17 1004     Glucose 96 mg/dL      BUN 20 mg/dL      Creatinine 1.05 mg/dL      Sodium 139 mmol/L      Potassium 4.5 mmol/L      Chloride 105 mmol/L      CO2 24.0 mmol/L      Calcium 9.3 mg/dL      Total Protein 7.9 g/dL      Albumin 4.50 g/dL      ALT (SGPT) 41 U/L      AST (SGOT) 26 U/L      Alkaline Phosphatase 51 U/L      Total Bilirubin 0.7 mg/dL      eGFR Non African Amer 69 mL/min/1.73      Globulin 3.4 gm/dL      A/G Ratio 1.3 g/dL      BUN/Creatinine Ratio 19.0     Anion Gap 10.0 mmol/L     Narrative:       The MDRD GFR formula is only valid for adults with stable renal function between ages 18 and 70.    Protime-INR [[id number]]  (Normal) Collected:  08/11/17 0934    Specimen:  Blood Updated:  08/11/17 0958     Protime 14.0 Seconds      INR 1.05    aPTT [[id number]]  (Normal) Collected:  08/11/17 0934    Specimen:  Blood Updated:  08/11/17 0958     PTT 33.1 seconds     C-reactive Protein [[id number]]  (Normal) Collected:  08/11/17 0934    Specimen:  Blood Updated:  08/11/17 1004     C-Reactive Protein <0.50 mg/dL     Sedimentation Rate [[id number]]  (Normal) Collected:  08/11/17 0934    Specimen:  Blood Updated:  08/11/17 1008     Sed Rate <1 mm/hr     CBC Auto Differential [[id number]]  (Abnormal) Collected:  08/11/17 0934    Specimen:  Blood Updated:  08/11/17 1030     WBC 4.98 10*3/mm3      RBC 4.72 (L) 10*6/mm3      Hemoglobin 14.0 g/dL      Hematocrit 41.1 %      MCV 87.1 fL      MCH 29.7 pg      MCHC 34.1 g/dL      RDW 14.2 %      RDW-SD 45.4 fl      MPV 10.7 fL      Platelets 124 (L) 10*3/mm3     Scan Slide [[id number]] Collected:  08/11/17 0934    Specimen:  Blood Updated:  08/11/17 1030     Scan Slide --      See Manual Differential Results       Manual Differential [[id number]]  (Abnormal) Collected:  08/11/17 0934    Specimen:  Blood Updated:   08/11/17 1030     Neutrophil % 34.0 (L) %      Lymphocyte % 25.0 %      Monocyte % 39.0 (H) %      Bands %  2.0 %      Neutrophils Absolute 1.79 (L) 10*3/mm3      Lymphocytes Absolute 1.25 10*3/mm3      Monocytes Absolute 1.94 (H) 10*3/mm3      Acanthocytes Slight/1+     Galilea Cells Slight/1+     WBC Morphology Normal     Platelet Estimate Decreased    Slide Review, Hematology [[id number]] Collected:  08/11/17 0934    Specimen:  Blood Updated:  08/11/17 1132     Performed by: Amandeep Claros M.D.     Pathologist Interpretation Reviewed. I agree with the differential cell count.  There is an absolute and relative monocytosis with occasional reactive lymphocytes. No blasts are identified. Clinical correlation is suggested.    Urine Drug Screen [[id number]]  (Normal) Collected:  08/11/17 1056    Specimen:  Urine from Urine, Clean Catch Updated:  08/11/17 1126     Amphetamine Screen, Urine Negative     Barbiturates Screen, Urine Negative     Benzodiazepine Screen, Urine Negative     Cocaine Screen, Urine Negative     Methadone Screen, Urine Negative     Opiate Screen Negative     Phencyclidine (PCP), Urine Negative     THC, Screen, Urine Negative    Narrative:       Negative Thresholds For Drugs Screened in Urine:    Amphetamines          500 ng/ml  Barbiturates          200 ng/ml  Benzodiazepines       200 ng/ml  Cocaine               150 ng/ml  Methadone             150 ng/ml  Opiates               300 ng/ml  Phencyclidine         25 ng/ml  THC                      50 ng/ml    The normal value for all drugs tested is negative. This report includes final unconfirmed screening results.  A positive result by this assay can be, at your request, sent to the Reference Lab for confirmation by gas chromatography. Unconfirmed results must not be used for non-medical purposes, such as employment or legal testing. Clinical consideration should be applied to any drug of abuse test result, particularly when unconfirmed results  are used.    Urinalysis With / Culture If Indicated [[id number]]  (Normal) Collected:  08/11/17 1056    Specimen:  Urine from Urine, Clean Catch Updated:  08/11/17 1107     Color, UA Yellow     Appearance, UA Clear     pH, UA 5.5     Specific Gravity, UA 1.011     Glucose, UA Negative     Ketones, UA Negative     Bilirubin, UA Negative     Blood, UA Negative     Protein, UA Negative     Leuk Esterase, UA Negative     Nitrite, UA Negative     Urobilinogen, UA 0.2 E.U./dL    Narrative:       Urine microscopic not indicated.                  MDM  Number of Diagnoses or Management Options  Aneurysm of infrarenal abdominal aorta: new and requires workup  Monocytosis: new and requires workup  Paresthesia: new and requires workup     Amount and/or Complexity of Data Reviewed  Clinical lab tests: ordered and reviewed  Tests in the radiology section of CPT®: ordered and reviewed  Tests in the medicine section of CPT®: ordered and reviewed  Decide to obtain previous medical records or to obtain history from someone other than the patient: yes  Discuss the patient with other providers: yes    Patient Progress  Patient progress: stable      Final diagnoses:   Monocytosis   Paresthesia   Aneurysm of infrarenal abdominal aorta            Airam Smith, APRN  08/11/17 1304       Airam Smith, APRN  08/11/17 1300

## 2017-08-11 NOTE — DISCHARGE INSTRUCTIONS
Please f/u with your PCP in 1-2 days for a recheck  You will need to have an abdominal US performed for the infrarenal abdominal aortic aneurysm which is noted on the CT scan  You will need a recheck of your CBC for the monocytosis  R/t to the ER as needed

## 2017-08-12 ENCOUNTER — HOSPITAL ENCOUNTER (EMERGENCY)
Facility: HOSPITAL | Age: 77
Discharge: HOME | End: 2017-08-12
Attending: EMERGENCY MEDICINE | Admitting: EMERGENCY MEDICINE

## 2017-08-12 VITALS
TEMPERATURE: 98.2 F | RESPIRATION RATE: 16 BRPM | HEIGHT: 71 IN | SYSTOLIC BLOOD PRESSURE: 180 MMHG | OXYGEN SATURATION: 99 % | WEIGHT: 175 LBS | HEART RATE: 65 BPM | BODY MASS INDEX: 24.5 KG/M2 | DIASTOLIC BLOOD PRESSURE: 80 MMHG

## 2017-08-12 DIAGNOSIS — M54.50 ACUTE BILATERAL LOW BACK PAIN WITHOUT SCIATICA: Primary | ICD-10-CM

## 2017-08-12 PROCEDURE — 25010000002 KETOROLAC TROMETHAMINE PER 15 MG: Performed by: EMERGENCY MEDICINE

## 2017-08-12 PROCEDURE — 99283 EMERGENCY DEPT VISIT LOW MDM: CPT

## 2017-08-12 PROCEDURE — 96372 THER/PROPH/DIAG INJ SC/IM: CPT

## 2017-08-12 RX ORDER — CYCLOBENZAPRINE HCL 10 MG
10 TABLET ORAL 3 TIMES DAILY
Qty: 15 TABLET | Refills: 0 | Status: SHIPPED | OUTPATIENT
Start: 2017-08-12

## 2017-08-12 RX ORDER — HYDROCODONE BITARTRATE AND ACETAMINOPHEN 7.5; 325 MG/1; MG/1
1 TABLET ORAL EVERY 6 HOURS PRN
Qty: 15 TABLET | Refills: 0 | Status: SHIPPED | OUTPATIENT
Start: 2017-08-12

## 2017-08-12 RX ORDER — CYCLOBENZAPRINE HCL 10 MG
10 TABLET ORAL ONCE
Status: COMPLETED | OUTPATIENT
Start: 2017-08-12 | End: 2017-08-12

## 2017-08-12 RX ORDER — KETOROLAC TROMETHAMINE 10 MG/1
10 TABLET, FILM COATED ORAL EVERY 6 HOURS PRN
Qty: 12 TABLET | Refills: 0 | Status: SHIPPED | OUTPATIENT
Start: 2017-08-12 | End: 2017-11-02 | Stop reason: HOSPADM

## 2017-08-12 RX ORDER — KETOROLAC TROMETHAMINE 30 MG/ML
30 INJECTION, SOLUTION INTRAMUSCULAR; INTRAVENOUS ONCE
Status: COMPLETED | OUTPATIENT
Start: 2017-08-12 | End: 2017-08-12

## 2017-08-12 RX ORDER — HYDROCODONE BITARTRATE AND ACETAMINOPHEN 10; 325 MG/1; MG/1
1 TABLET ORAL ONCE
Status: COMPLETED | OUTPATIENT
Start: 2017-08-12 | End: 2017-08-12

## 2017-08-12 RX ADMIN — HYDROCODONE BITARTRATE AND ACETAMINOPHEN 1 TABLET: 10; 325 TABLET ORAL at 21:48

## 2017-08-12 RX ADMIN — CYCLOBENZAPRINE 10 MG: 10 TABLET, FILM COATED ORAL at 21:48

## 2017-08-12 RX ADMIN — KETOROLAC TROMETHAMINE 30 MG: 30 INJECTION, SOLUTION INTRAMUSCULAR at 21:49

## 2017-08-13 NOTE — ED PROVIDER NOTES
Subjective   HPI Comments: Pt c/o numbness to his back.  He keeps switching btw pain and numbness using them in the same context making his complaint difficult to understand.  He states that he has numbness to his lower back that will radiate down the right leg, which he states is causing him to have pain.  He reports that he went walking today to the mail which is when the pain worsened.  Pt was seen yesterday for the same issue where he essentially had a neg CT report.  Pt has not had any issues with bowel/bladder incontinence/retention.          History provided by:  Patient      Review of Systems   Constitutional: Negative for activity change, appetite change, chills, diaphoresis, fatigue and fever.   HENT: Negative for congestion, ear pain, nosebleeds, postnasal drip and sinus pressure.    Eyes: Negative for photophobia and pain.   Respiratory: Negative for cough, chest tightness, shortness of breath and wheezing.    Cardiovascular: Negative for chest pain.   Gastrointestinal: Negative for abdominal pain, blood in stool, diarrhea, nausea and vomiting.   Endocrine: Negative for cold intolerance and heat intolerance.   Genitourinary: Negative for difficulty urinating, dysuria and flank pain.   Musculoskeletal: Positive for back pain. Negative for arthralgias, neck pain and neck stiffness.   Skin: Negative for color change and rash.   Neurological: Positive for numbness. Negative for dizziness, weakness and headaches.   Hematological: Negative for adenopathy. Does not bruise/bleed easily.   Psychiatric/Behavioral: Negative for confusion and sleep disturbance. The patient is not nervous/anxious.        Past Medical History:   Diagnosis Date   • Atypical chest pain    • Hypertension    • Sciatica        No Known Allergies    Past Surgical History:   Procedure Laterality Date   • CALCANEOUS OPEN REDUCTION INTERNAL FIXATION Bilateral    • CHOLECYSTECTOMY     • HERNIA REPAIR         Family History   Problem Relation  Age of Onset   • Cancer Mother    • No Known Problems Father    • No Known Problems Sister    • No Known Problems Brother    • No Known Problems Sister    • No Known Problems Brother        Social History     Social History   • Marital status: Single     Spouse name: N/A   • Number of children: N/A   • Years of education: N/A     Social History Main Topics   • Smoking status: Former Smoker     Packs/day: 1.00     Years: 30.00   • Smokeless tobacco: None   • Alcohol use No   • Drug use: No   • Sexual activity: Defer     Other Topics Concern   • None     Social History Narrative           Objective   Physical Exam   Constitutional: He is oriented to person, place, and time. He appears well-developed and well-nourished. No distress.   HENT:   Head: Normocephalic and atraumatic.   Mouth/Throat: Oropharynx is clear and moist.   Eyes: Conjunctivae and EOM are normal. Pupils are equal, round, and reactive to light.   Cardiovascular: Normal rate, regular rhythm and normal heart sounds.  Exam reveals no gallop and no friction rub.    No murmur heard.  Musculoskeletal: Normal range of motion. He exhibits tenderness. He exhibits no edema.        Lumbar back: He exhibits tenderness. He exhibits normal range of motion, no bony tenderness, no pain and no spasm.   Palpation of the lower back does not significantly or directly cause pain.  Pt rubs his lower back stating that there is numbness.  There is no specific or focal area that will cause pain.     Neurological: He is alert and oriented to person, place, and time. No cranial nerve deficit.   Skin: Skin is warm and dry. No erythema.   Psychiatric: He has a normal mood and affect. His behavior is normal. Judgment and thought content normal.   Nursing note and vitals reviewed.      Procedures         ED Course  ED Course                  MDM  Number of Diagnoses or Management Options  Acute bilateral low back pain without sciatica: new and requires workup  Diagnosis management  comments: Long discussion with pt about the need to see his PCP for his back pain.  It appears that this has been an ongoing issue that will flair from time to time.  Will place his on muscle relaxer, NSAID and pain mediation.  He has a hx of sciatica and I suspect that his discomfort may be related to that.  His lumbar CT yesterday was essentially negative outside of some degenerative changes.  Pt instructed to return to the ED if he has any further issues or new complaints.       Risk of Complications, Morbidity, and/or Mortality  Presenting problems: moderate  Diagnostic procedures: moderate  Management options: moderate    Patient Progress  Patient progress: stable      Final diagnoses:   Acute bilateral low back pain without sciatica            Nate Sanchez MD  08/12/17 4784

## 2017-08-15 NOTE — ED NOTES
"ED Call Back Questions    1. How are you doing since leaving the Emergency Department?    Doing ok  2. Do you have any questions about your discharge instructions? No     3. Have you filled your new prescriptions yet? Yes   a. Do you have any questions about those medications? No     4. Were you able to make a follow-up appointment with the physician? No     5. Do you have a primary care physician? Yes   a. If No, would you like for me to set you up with one? Yes   i. If Yes, “I will have our ED  give you a call right back at this number to work with you on the best time for an appointment.”    6. We are always looking to get better at what we do. Do you have any suggestions for what we can do to be even better? No   a. If Yes, \"Thank you for sharing your concerns. I apologize. I will follow up with our manager and patient . Would you like someone to call you back?\" N/A    7. Is there anything else I can do for you? Yes   Need a pcp     Genaro Patel  08/15/17 4014    "

## 2017-08-31 ENCOUNTER — OFFICE VISIT (OUTPATIENT)
Dept: FAMILY MEDICINE CLINIC | Age: 77
End: 2017-08-31
Payer: MEDICARE

## 2017-08-31 VITALS
WEIGHT: 175 LBS | SYSTOLIC BLOOD PRESSURE: 139 MMHG | TEMPERATURE: 97.3 F | HEIGHT: 71 IN | OXYGEN SATURATION: 95 % | DIASTOLIC BLOOD PRESSURE: 88 MMHG | BODY MASS INDEX: 24.5 KG/M2 | HEART RATE: 80 BPM

## 2017-08-31 DIAGNOSIS — M54.50 CHRONIC MIDLINE LOW BACK PAIN WITHOUT SCIATICA: ICD-10-CM

## 2017-08-31 DIAGNOSIS — E03.8 SUBCLINICAL HYPOTHYROIDISM: Primary | ICD-10-CM

## 2017-08-31 DIAGNOSIS — E78.01 FAMILIAL HYPERCHOLESTEROLEMIA: ICD-10-CM

## 2017-08-31 DIAGNOSIS — G89.29 CHRONIC MIDLINE LOW BACK PAIN WITHOUT SCIATICA: ICD-10-CM

## 2017-08-31 DIAGNOSIS — D72.821 MONOCYTOSIS: ICD-10-CM

## 2017-08-31 PROCEDURE — 4040F PNEUMOC VAC/ADMIN/RCVD: CPT | Performed by: FAMILY MEDICINE

## 2017-08-31 PROCEDURE — 1036F TOBACCO NON-USER: CPT | Performed by: FAMILY MEDICINE

## 2017-08-31 PROCEDURE — 1123F ACP DISCUSS/DSCN MKR DOCD: CPT | Performed by: FAMILY MEDICINE

## 2017-08-31 PROCEDURE — G8427 DOCREV CUR MEDS BY ELIG CLIN: HCPCS | Performed by: FAMILY MEDICINE

## 2017-08-31 PROCEDURE — G8420 CALC BMI NORM PARAMETERS: HCPCS | Performed by: FAMILY MEDICINE

## 2017-08-31 PROCEDURE — 99203 OFFICE O/P NEW LOW 30 MIN: CPT | Performed by: FAMILY MEDICINE

## 2017-08-31 PROCEDURE — 3017F COLORECTAL CA SCREEN DOC REV: CPT | Performed by: FAMILY MEDICINE

## 2017-11-01 ENCOUNTER — APPOINTMENT (OUTPATIENT)
Dept: CT IMAGING | Facility: HOSPITAL | Age: 77
End: 2017-11-01

## 2017-11-01 ENCOUNTER — APPOINTMENT (OUTPATIENT)
Dept: GENERAL RADIOLOGY | Facility: HOSPITAL | Age: 77
End: 2017-11-01

## 2017-11-01 ENCOUNTER — HOSPITAL ENCOUNTER (OUTPATIENT)
Facility: HOSPITAL | Age: 77
Setting detail: OBSERVATION
Discharge: HOME | End: 2017-11-02
Attending: INTERNAL MEDICINE | Admitting: INTERNAL MEDICINE

## 2017-11-01 DIAGNOSIS — R20.2 NUMBNESS AND TINGLING: Primary | ICD-10-CM

## 2017-11-01 DIAGNOSIS — R20.0 NUMBNESS AND TINGLING: Primary | ICD-10-CM

## 2017-11-01 DIAGNOSIS — M54.31 SCIATICA OF RIGHT SIDE: ICD-10-CM

## 2017-11-01 DIAGNOSIS — M54.2 NECK PAIN: ICD-10-CM

## 2017-11-01 DIAGNOSIS — D69.6 THROMBOCYTOPENIA (HCC): ICD-10-CM

## 2017-11-01 DIAGNOSIS — R47.1 DYSARTHRIA: ICD-10-CM

## 2017-11-01 LAB
ALBUMIN SERPL-MCNC: 4.2 G/DL (ref 3.5–5)
ALBUMIN/GLOB SERPL: 1.3 G/DL (ref 1.1–2.5)
ALP SERPL-CCNC: 52 U/L (ref 24–120)
ALT SERPL W P-5'-P-CCNC: 39 U/L (ref 0–54)
ANION GAP SERPL CALCULATED.3IONS-SCNC: 11 MMOL/L (ref 4–13)
APTT PPP: 32.4 SECONDS (ref 24.1–34.8)
AST SERPL-CCNC: 30 U/L (ref 7–45)
BILIRUB SERPL-MCNC: 0.4 MG/DL (ref 0.1–1)
BUN BLD-MCNC: 21 MG/DL (ref 5–21)
BUN/CREAT SERPL: 19.1 (ref 7–25)
CALCIUM SPEC-SCNC: 9.6 MG/DL (ref 8.4–10.4)
CHLORIDE SERPL-SCNC: 105 MMOL/L (ref 98–110)
CO2 SERPL-SCNC: 27 MMOL/L (ref 24–31)
CREAT BLD-MCNC: 1.1 MG/DL (ref 0.5–1.4)
CRP SERPL-MCNC: <0.5 MG/DL (ref 0–0.99)
DEPRECATED RDW RBC AUTO: 46.4 FL (ref 40–54)
ERYTHROCYTE [DISTWIDTH] IN BLOOD BY AUTOMATED COUNT: 14.4 % (ref 12–15)
GFR SERPL CREATININE-BSD FRML MDRD: 65 ML/MIN/1.73
GLOBULIN UR ELPH-MCNC: 3.3 GM/DL
GLUCOSE BLD-MCNC: 91 MG/DL (ref 70–100)
HCT VFR BLD AUTO: 39.9 % (ref 40–52)
HGB BLD-MCNC: 13.8 G/DL (ref 14–18)
INR PPP: 1.05 (ref 0.91–1.09)
LYMPHOCYTES # BLD MANUAL: 1.54 10*3/MM3 (ref 0.72–4.86)
LYMPHOCYTES NFR BLD MANUAL: 23 % (ref 15–45)
LYMPHOCYTES NFR BLD MANUAL: 31 % (ref 4–12)
MCH RBC QN AUTO: 30.3 PG (ref 28–32)
MCHC RBC AUTO-ENTMCNC: 34.6 G/DL (ref 33–36)
MCV RBC AUTO: 87.7 FL (ref 82–95)
MONOCYTES # BLD AUTO: 2.08 10*3/MM3 (ref 0.19–1.3)
NEUTROPHILS # BLD AUTO: 2.62 10*3/MM3 (ref 1.87–8.4)
NEUTROPHILS NFR BLD MANUAL: 35 % (ref 39–78)
NEUTS BAND NFR BLD MANUAL: 4 % (ref 0–10)
NRBC SPEC MANUAL: 1 /100 WBC (ref 0–0)
PLAT MORPH BLD: NORMAL
PLATELET # BLD AUTO: 110 10*3/MM3 (ref 130–400)
PMV BLD AUTO: 10.9 FL (ref 6–12)
POTASSIUM BLD-SCNC: 4.3 MMOL/L (ref 3.5–5.3)
PROT SERPL-MCNC: 7.5 G/DL (ref 6.3–8.7)
PROTHROMBIN TIME: 14 SECONDS (ref 11.9–14.6)
RBC # BLD AUTO: 4.55 10*6/MM3 (ref 4.8–5.9)
RBC MORPH BLD: NORMAL
SCAN SLIDE: NORMAL
SODIUM BLD-SCNC: 143 MMOL/L (ref 135–145)
TROPONIN I SERPL-MCNC: <0.012 NG/ML (ref 0–0.03)
VARIANT LYMPHS NFR BLD MANUAL: 7 % (ref 0–5)
WBC MORPH BLD: NORMAL
WBC NRBC COR # BLD: 6.71 10*3/MM3 (ref 4.8–10.8)

## 2017-11-01 PROCEDURE — 93010 ELECTROCARDIOGRAM REPORT: CPT | Performed by: INTERNAL MEDICINE

## 2017-11-01 PROCEDURE — 96375 TX/PRO/DX INJ NEW DRUG ADDON: CPT

## 2017-11-01 PROCEDURE — 80053 COMPREHEN METABOLIC PANEL: CPT | Performed by: NURSE PRACTITIONER

## 2017-11-01 PROCEDURE — 71010 HC CHEST PA OR AP: CPT

## 2017-11-01 PROCEDURE — 84484 ASSAY OF TROPONIN QUANT: CPT | Performed by: NURSE PRACTITIONER

## 2017-11-01 PROCEDURE — 85025 COMPLETE CBC W/AUTO DIFF WBC: CPT | Performed by: NURSE PRACTITIONER

## 2017-11-01 PROCEDURE — 72125 CT NECK SPINE W/O DYE: CPT

## 2017-11-01 PROCEDURE — 85007 BL SMEAR W/DIFF WBC COUNT: CPT | Performed by: NURSE PRACTITIONER

## 2017-11-01 PROCEDURE — 99285 EMERGENCY DEPT VISIT HI MDM: CPT

## 2017-11-01 PROCEDURE — 70450 CT HEAD/BRAIN W/O DYE: CPT

## 2017-11-01 PROCEDURE — 93005 ELECTROCARDIOGRAM TRACING: CPT | Performed by: NURSE PRACTITIONER

## 2017-11-01 PROCEDURE — 96374 THER/PROPH/DIAG INJ IV PUSH: CPT

## 2017-11-01 PROCEDURE — 86140 C-REACTIVE PROTEIN: CPT | Performed by: NURSE PRACTITIONER

## 2017-11-01 PROCEDURE — 85730 THROMBOPLASTIN TIME PARTIAL: CPT | Performed by: NURSE PRACTITIONER

## 2017-11-01 PROCEDURE — 85610 PROTHROMBIN TIME: CPT | Performed by: NURSE PRACTITIONER

## 2017-11-01 PROCEDURE — 25010000002 MORPHINE SULFATE (PF) 2 MG/ML SOLUTION: Performed by: NURSE PRACTITIONER

## 2017-11-01 PROCEDURE — 25010000002 ONDANSETRON PER 1 MG: Performed by: NURSE PRACTITIONER

## 2017-11-01 RX ORDER — GUAIFENESIN, PSEUDOEPHEDRINE HYDROCHLORIDE 600; 60 MG/1; MG/1
1 TABLET, EXTENDED RELEASE ORAL EVERY 12 HOURS
Status: CANCELLED | OUTPATIENT
Start: 2017-11-01

## 2017-11-01 RX ORDER — SODIUM CHLORIDE 0.9 % (FLUSH) 0.9 %
10 SYRINGE (ML) INJECTION AS NEEDED
Status: DISCONTINUED | OUTPATIENT
Start: 2017-11-01 | End: 2017-11-02 | Stop reason: HOSPADM

## 2017-11-01 RX ORDER — SODIUM CHLORIDE 0.9 % (FLUSH) 0.9 %
1-10 SYRINGE (ML) INJECTION AS NEEDED
Status: CANCELLED | OUTPATIENT
Start: 2017-11-01

## 2017-11-01 RX ORDER — CYCLOBENZAPRINE HCL 10 MG
10 TABLET ORAL 3 TIMES DAILY
Status: CANCELLED | OUTPATIENT
Start: 2017-11-01

## 2017-11-01 RX ORDER — HYDROCODONE BITARTRATE AND ACETAMINOPHEN 7.5; 325 MG/1; MG/1
1 TABLET ORAL EVERY 6 HOURS PRN
Status: CANCELLED | OUTPATIENT
Start: 2017-11-01

## 2017-11-01 RX ORDER — LISINOPRIL 10 MG/1
5 TABLET ORAL DAILY
Status: CANCELLED | OUTPATIENT
Start: 2017-11-02

## 2017-11-01 RX ORDER — MORPHINE SULFATE 2 MG/ML
2 INJECTION, SOLUTION INTRAMUSCULAR; INTRAVENOUS ONCE
Status: COMPLETED | OUTPATIENT
Start: 2017-11-01 | End: 2017-11-01

## 2017-11-01 RX ORDER — ASPIRIN 81 MG/1
81 TABLET ORAL DAILY
Status: CANCELLED | OUTPATIENT
Start: 2017-11-02

## 2017-11-01 RX ORDER — ONDANSETRON 2 MG/ML
4 INJECTION INTRAMUSCULAR; INTRAVENOUS ONCE
Status: COMPLETED | OUTPATIENT
Start: 2017-11-01 | End: 2017-11-01

## 2017-11-01 RX ADMIN — ONDANSETRON 4 MG: 2 INJECTION INTRAMUSCULAR; INTRAVENOUS at 23:27

## 2017-11-01 RX ADMIN — MORPHINE SULFATE 2 MG: 2 INJECTION, SOLUTION INTRAMUSCULAR; INTRAVENOUS at 23:26

## 2017-11-02 ENCOUNTER — APPOINTMENT (OUTPATIENT)
Dept: MRI IMAGING | Facility: HOSPITAL | Age: 77
End: 2017-11-02

## 2017-11-02 ENCOUNTER — APPOINTMENT (OUTPATIENT)
Dept: CARDIOLOGY | Facility: HOSPITAL | Age: 77
End: 2017-11-02
Attending: INTERNAL MEDICINE

## 2017-11-02 ENCOUNTER — APPOINTMENT (OUTPATIENT)
Dept: ULTRASOUND IMAGING | Facility: HOSPITAL | Age: 77
End: 2017-11-02

## 2017-11-02 VITALS
SYSTOLIC BLOOD PRESSURE: 139 MMHG | RESPIRATION RATE: 20 BRPM | BODY MASS INDEX: 24.5 KG/M2 | DIASTOLIC BLOOD PRESSURE: 87 MMHG | OXYGEN SATURATION: 93 % | HEART RATE: 70 BPM | WEIGHT: 175 LBS | HEIGHT: 71 IN | TEMPERATURE: 97.9 F

## 2017-11-02 PROBLEM — M54.31 SCIATICA OF RIGHT SIDE: Status: ACTIVE | Noted: 2017-11-02

## 2017-11-02 PROBLEM — G25.5 CHOREA: Status: ACTIVE | Noted: 2017-11-02

## 2017-11-02 LAB
AMORPH URATE CRY URNS QL MICRO: ABNORMAL /HPF
ARTICHOKE IGE QN: 155 MG/DL (ref 0–99)
BACTERIA UR QL AUTO: ABNORMAL /HPF
BH CV ECHO MEAS - AO ROOT AREA (BSA CORRECTED): 1.9
BH CV ECHO MEAS - AO ROOT AREA: 10.8 CM^2
BH CV ECHO MEAS - AO ROOT DIAM: 3.7 CM
BH CV ECHO MEAS - BSA(HAYCOCK): 2 M^2
BH CV ECHO MEAS - BSA: 2 M^2
BH CV ECHO MEAS - BZI_BMI: 24.4 KILOGRAMS/M^2
BH CV ECHO MEAS - BZI_METRIC_HEIGHT: 180.3 CM
BH CV ECHO MEAS - BZI_METRIC_WEIGHT: 79.4 KG
BH CV ECHO MEAS - EDV(CUBED): 76.8 ML
BH CV ECHO MEAS - EDV(TEICH): 80.8 ML
BH CV ECHO MEAS - EF(CUBED): 93 %
BH CV ECHO MEAS - EF(TEICH): 88.8 %
BH CV ECHO MEAS - ESV(CUBED): 5.4 ML
BH CV ECHO MEAS - ESV(TEICH): 9 ML
BH CV ECHO MEAS - FS: 58.8 %
BH CV ECHO MEAS - IVS/LVPW: 1
BH CV ECHO MEAS - IVSD: 1.1 CM
BH CV ECHO MEAS - LA DIMENSION: 3 CM
BH CV ECHO MEAS - LA/AO: 0.81
BH CV ECHO MEAS - LAT PEAK E' VEL: 8.3 CM/SEC
BH CV ECHO MEAS - LV MASS(C)D: 150.8 GRAMS
BH CV ECHO MEAS - LV MASS(C)DI: 75.7 GRAMS/M^2
BH CV ECHO MEAS - LV MAX PG: 14.3 MMHG
BH CV ECHO MEAS - LV MEAN PG: 7 MMHG
BH CV ECHO MEAS - LV V1 MAX: 189 CM/SEC
BH CV ECHO MEAS - LV V1 MEAN: 120 CM/SEC
BH CV ECHO MEAS - LV V1 VTI: 40.3 CM
BH CV ECHO MEAS - LVIDD: 4.3 CM
BH CV ECHO MEAS - LVIDS: 1.8 CM
BH CV ECHO MEAS - LVOT AREA (M): 3.8 CM^2
BH CV ECHO MEAS - LVOT AREA: 3.8 CM^2
BH CV ECHO MEAS - LVOT DIAM: 2.2 CM
BH CV ECHO MEAS - LVPWD: 1.1 CM
BH CV ECHO MEAS - MED PEAK E' VEL: 5.22 CM/SEC
BH CV ECHO MEAS - MV A MAX VEL: 124 CM/SEC
BH CV ECHO MEAS - MV DEC TIME: 0.38 SEC
BH CV ECHO MEAS - MV E MAX VEL: 68.3 CM/SEC
BH CV ECHO MEAS - MV E/A: 0.55
BH CV ECHO MEAS - SI(CUBED): 35.8 ML/M^2
BH CV ECHO MEAS - SI(LVOT): 76.9 ML/M^2
BH CV ECHO MEAS - SI(TEICH): 36 ML/M^2
BH CV ECHO MEAS - SV(CUBED): 71.4 ML
BH CV ECHO MEAS - SV(LVOT): 153.2 ML
BH CV ECHO MEAS - SV(TEICH): 71.8 ML
BILIRUB UR QL STRIP: NEGATIVE
CHOLEST SERPL-MCNC: 202 MG/DL (ref 130–200)
CLARITY UR: ABNORMAL
COLOR UR: YELLOW
CRP SERPL-MCNC: <0.5 MG/DL (ref 0–0.99)
E/E' RATIO: 13.1
ERYTHROCYTE [SEDIMENTATION RATE] IN BLOOD: <1 MM/HR (ref 0–15)
FERRITIN SERPL-MCNC: 190 NG/ML (ref 17.9–464)
FERRITIN SERPL-MCNC: NORMAL NG/ML (ref 17.9–464)
FOLATE SERPL-MCNC: 9.06 NG/ML
FOLATE SERPL-MCNC: NORMAL NG/ML
GLUCOSE UR STRIP-MCNC: NEGATIVE MG/DL
GRAN CASTS URNS QL MICRO: ABNORMAL /LPF
HBA1C MFR BLD: 5.6 %
HDLC SERPL-MCNC: 37 MG/DL
HGB UR QL STRIP.AUTO: NEGATIVE
HYALINE CASTS UR QL AUTO: ABNORMAL /LPF
IRON 24H UR-MRATE: 64 MCG/DL (ref 42–180)
IRON 24H UR-MRATE: NORMAL MCG/DL (ref 42–180)
IRON SATN MFR SERPL: 22 % (ref 20–45)
IRON SATN MFR SERPL: NORMAL % (ref 20–45)
KETONES UR QL STRIP: NEGATIVE
LDLC/HDLC SERPL: 3.72 {RATIO}
LEFT ATRIUM VOLUME INDEX: 20 ML/M2
LEFT ATRIUM VOLUME: 39.8 CM3
LEUKOCYTE ESTERASE UR QL STRIP.AUTO: ABNORMAL
MAXIMAL PREDICTED HEART RATE: 145 BPM
NITRITE UR QL STRIP: NEGATIVE
PH UR STRIP.AUTO: 6.5 [PH] (ref 5–8)
PROT UR QL STRIP: NEGATIVE
RBC # UR: ABNORMAL /HPF
REF LAB TEST METHOD: ABNORMAL
RETICS #: 0.04 10*6/MM3 (ref 0.02–0.13)
RETICS #: 0.04 10*6/MM3 (ref 0.02–0.13)
RETICS/RBC NFR AUTO: 0.81 % (ref 0.6–1.8)
RETICS/RBC NFR AUTO: 0.81 % (ref 0.6–1.8)
SP GR UR STRIP: 1.02 (ref 1–1.03)
SQUAMOUS #/AREA URNS HPF: ABNORMAL /HPF
STRESS TARGET HR: 123 BPM
TIBC SERPL-MCNC: 297 MCG/DL (ref 225–420)
TIBC SERPL-MCNC: NORMAL MCG/DL (ref 225–420)
TRIGL SERPL-MCNC: 137 MG/DL (ref 0–149)
UROBILINOGEN UR QL STRIP: ABNORMAL
VIT B12 BLD-MCNC: 490 PG/ML (ref 239–931)
VIT B12 BLD-MCNC: NORMAL PG/ML (ref 239–931)
WBC UR QL AUTO: ABNORMAL /HPF

## 2017-11-02 PROCEDURE — 83036 HEMOGLOBIN GLYCOSYLATED A1C: CPT | Performed by: INTERNAL MEDICINE

## 2017-11-02 PROCEDURE — 93880 EXTRACRANIAL BILAT STUDY: CPT

## 2017-11-02 PROCEDURE — 83540 ASSAY OF IRON: CPT | Performed by: INTERNAL MEDICINE

## 2017-11-02 PROCEDURE — G0378 HOSPITAL OBSERVATION PER HR: HCPCS

## 2017-11-02 PROCEDURE — 80061 LIPID PANEL: CPT | Performed by: INTERNAL MEDICINE

## 2017-11-02 PROCEDURE — 81001 URINALYSIS AUTO W/SCOPE: CPT | Performed by: NURSE PRACTITIONER

## 2017-11-02 PROCEDURE — 82607 VITAMIN B-12: CPT | Performed by: INTERNAL MEDICINE

## 2017-11-02 PROCEDURE — 72141 MRI NECK SPINE W/O DYE: CPT

## 2017-11-02 PROCEDURE — 82746 ASSAY OF FOLIC ACID SERUM: CPT | Performed by: INTERNAL MEDICINE

## 2017-11-02 PROCEDURE — G8999 MOTOR SPEECH CURRENT STATUS: HCPCS

## 2017-11-02 PROCEDURE — 83550 IRON BINDING TEST: CPT | Performed by: INTERNAL MEDICINE

## 2017-11-02 PROCEDURE — 96375 TX/PRO/DX INJ NEW DRUG ADDON: CPT

## 2017-11-02 PROCEDURE — 93880 EXTRACRANIAL BILAT STUDY: CPT | Performed by: SURGERY

## 2017-11-02 PROCEDURE — 82728 ASSAY OF FERRITIN: CPT | Performed by: INTERNAL MEDICINE

## 2017-11-02 PROCEDURE — 85651 RBC SED RATE NONAUTOMATED: CPT | Performed by: INTERNAL MEDICINE

## 2017-11-02 PROCEDURE — 93306 TTE W/DOPPLER COMPLETE: CPT

## 2017-11-02 PROCEDURE — G9186 MOTOR SPEECH GOAL STATUS: HCPCS

## 2017-11-02 PROCEDURE — 70551 MRI BRAIN STEM W/O DYE: CPT

## 2017-11-02 PROCEDURE — 92523 SPEECH SOUND LANG COMPREHEN: CPT

## 2017-11-02 PROCEDURE — G9158 MOTOR SPEECH D/C STATUS: HCPCS

## 2017-11-02 PROCEDURE — 87086 URINE CULTURE/COLONY COUNT: CPT | Performed by: NURSE PRACTITIONER

## 2017-11-02 PROCEDURE — 25010000002 DEXAMETHASONE PER 1 MG: Performed by: INTERNAL MEDICINE

## 2017-11-02 PROCEDURE — 93306 TTE W/DOPPLER COMPLETE: CPT | Performed by: INTERNAL MEDICINE

## 2017-11-02 PROCEDURE — 86140 C-REACTIVE PROTEIN: CPT | Performed by: INTERNAL MEDICINE

## 2017-11-02 PROCEDURE — 85045 AUTOMATED RETICULOCYTE COUNT: CPT | Performed by: INTERNAL MEDICINE

## 2017-11-02 PROCEDURE — 99214 OFFICE O/P EST MOD 30 MIN: CPT | Performed by: PSYCHIATRY & NEUROLOGY

## 2017-11-02 RX ORDER — IBUPROFEN 200 MG
600 TABLET ORAL EVERY 8 HOURS PRN
Start: 2017-11-02 | End: 2017-11-07

## 2017-11-02 RX ORDER — LISINOPRIL 5 MG/1
5 TABLET ORAL DAILY
Status: DISCONTINUED | OUTPATIENT
Start: 2017-11-02 | End: 2017-11-02 | Stop reason: HOSPADM

## 2017-11-02 RX ORDER — ENALAPRILAT 2.5 MG/2ML
0.62 INJECTION INTRAVENOUS EVERY 6 HOURS PRN
Status: DISCONTINUED | OUTPATIENT
Start: 2017-11-02 | End: 2017-11-02 | Stop reason: HOSPADM

## 2017-11-02 RX ORDER — ASPIRIN 325 MG
325 TABLET ORAL DAILY
Status: DISCONTINUED | OUTPATIENT
Start: 2017-11-02 | End: 2017-11-02 | Stop reason: HOSPADM

## 2017-11-02 RX ORDER — ACETAMINOPHEN 325 MG/1
650 TABLET ORAL EVERY 4 HOURS PRN
Status: DISCONTINUED | OUTPATIENT
Start: 2017-11-02 | End: 2017-11-02 | Stop reason: HOSPADM

## 2017-11-02 RX ORDER — ASPIRIN 300 MG/1
300 SUPPOSITORY RECTAL DAILY
Status: DISCONTINUED | OUTPATIENT
Start: 2017-11-02 | End: 2017-11-02 | Stop reason: HOSPADM

## 2017-11-02 RX ORDER — ASPIRIN 81 MG/1
81 TABLET ORAL DAILY
Status: DISCONTINUED | OUTPATIENT
Start: 2017-11-02 | End: 2017-11-02 | Stop reason: SDUPTHER

## 2017-11-02 RX ORDER — BISACODYL 5 MG/1
5 TABLET, DELAYED RELEASE ORAL DAILY PRN
Status: DISCONTINUED | OUTPATIENT
Start: 2017-11-02 | End: 2017-11-02 | Stop reason: HOSPADM

## 2017-11-02 RX ORDER — DEXAMETHASONE SODIUM PHOSPHATE 4 MG/ML
4 INJECTION, SOLUTION INTRA-ARTICULAR; INTRALESIONAL; INTRAMUSCULAR; INTRAVENOUS; SOFT TISSUE EVERY 6 HOURS
Status: DISCONTINUED | OUTPATIENT
Start: 2017-11-02 | End: 2017-11-02 | Stop reason: HOSPADM

## 2017-11-02 RX ORDER — HYDROCODONE BITARTRATE AND ACETAMINOPHEN 7.5; 325 MG/1; MG/1
1 TABLET ORAL EVERY 4 HOURS PRN
Status: DISCONTINUED | OUTPATIENT
Start: 2017-11-02 | End: 2017-11-02 | Stop reason: HOSPADM

## 2017-11-02 RX ORDER — SODIUM CHLORIDE 0.9 % (FLUSH) 0.9 %
1-10 SYRINGE (ML) INJECTION AS NEEDED
Status: DISCONTINUED | OUTPATIENT
Start: 2017-11-02 | End: 2017-11-02 | Stop reason: HOSPADM

## 2017-11-02 RX ORDER — LORAZEPAM 1 MG/1
1 TABLET ORAL EVERY 6 HOURS PRN
Status: DISCONTINUED | OUTPATIENT
Start: 2017-11-02 | End: 2017-11-02 | Stop reason: HOSPADM

## 2017-11-02 RX ORDER — ATORVASTATIN CALCIUM 40 MG/1
80 TABLET, FILM COATED ORAL NIGHTLY
Status: DISCONTINUED | OUTPATIENT
Start: 2017-11-02 | End: 2017-11-02 | Stop reason: HOSPADM

## 2017-11-02 RX ORDER — FAMOTIDINE 20 MG/1
40 TABLET, FILM COATED ORAL DAILY
Status: DISCONTINUED | OUTPATIENT
Start: 2017-11-02 | End: 2017-11-02 | Stop reason: HOSPADM

## 2017-11-02 RX ORDER — ONDANSETRON 2 MG/ML
4 INJECTION INTRAMUSCULAR; INTRAVENOUS EVERY 6 HOURS PRN
Status: DISCONTINUED | OUTPATIENT
Start: 2017-11-02 | End: 2017-11-02 | Stop reason: HOSPADM

## 2017-11-02 RX ORDER — ACETAMINOPHEN 650 MG/1
650 SUPPOSITORY RECTAL EVERY 4 HOURS PRN
Status: DISCONTINUED | OUTPATIENT
Start: 2017-11-02 | End: 2017-11-02 | Stop reason: HOSPADM

## 2017-11-02 RX ADMIN — FAMOTIDINE 40 MG: 20 TABLET, FILM COATED ORAL at 09:04

## 2017-11-02 RX ADMIN — ASPIRIN 325 MG: 325 TABLET, COATED ORAL at 09:04

## 2017-11-02 RX ADMIN — LISINOPRIL 5 MG: 5 TABLET ORAL at 09:04

## 2017-11-02 RX ADMIN — DEXAMETHASONE SODIUM PHOSPHATE 4 MG: 4 INJECTION, SOLUTION INTRAMUSCULAR; INTRAVENOUS at 02:59

## 2017-11-02 RX ADMIN — HYDROCODONE BITARTRATE AND ACETAMINOPHEN 1 TABLET: 7.5; 325 TABLET ORAL at 09:06

## 2017-11-02 RX ADMIN — HYDROCODONE BITARTRATE AND ACETAMINOPHEN 1 TABLET: 7.5; 325 TABLET ORAL at 14:25

## 2017-11-02 RX ADMIN — HYDROCODONE BITARTRATE AND ACETAMINOPHEN 1 TABLET: 7.5; 325 TABLET ORAL at 02:59

## 2017-11-02 RX ADMIN — DEXAMETHASONE SODIUM PHOSPHATE 4 MG: 4 INJECTION, SOLUTION INTRAMUSCULAR; INTRAVENOUS at 09:05

## 2017-11-02 RX ADMIN — DEXAMETHASONE SODIUM PHOSPHATE 4 MG: 4 INJECTION, SOLUTION INTRAMUSCULAR; INTRAVENOUS at 14:23

## 2017-11-02 NOTE — THERAPY DISCHARGE NOTE
Acute Care - Speech Language Pathology Initial Eval/Discharge  [facility]     Patient Name: Clay Rivas  : 1940  MRN: [id number]  Today's Date: 2017  Onset of Illness/Injury or Date of Surgery Date: 17  Date of Referral to SLP: 17         Admit Date: 2017   Speech-language/cognitive eval completed. Pt was noted to have moderately slurred speech, which changed to only mildly impaired at times. Pt presented with increased rate of verbalizations, felt to further decrease speech intelligibility. Pt attributed imprecise articulation to absence of dentures in oral cavity, yet stated speech and cognitive fx appeared to be his baseline. Pt's sister was present and also feel pt's fx at baseline. During eval, pt presented with fluent expressive language, with occasional, brief word finding difficulty at the conversational level. No concerns with receptive language fx. Given that pt and sister do not feel pt speech-language and/or cognitive fx are variant from the pt's baseline, ST feels continued services are not warranted at this time. MD to re-consult if change or new concerns. Thanks! Arpita Stallings, CCC-SLP 2017 3:52 PM   Visit Dx:    ICD-10-CM ICD-9-CM   1. Numbness and tingling R20.0 782.0    R20.2    2. Neck pain M54.2 723.1   3. Thrombocytopenia D69.6 287.5   4. Sciatica of right side M54.31 724.3   5. Dysarthria R47.1 784.51     Patient Active Problem List   Diagnosis   • Aspiration into airway   • Hypertension   • Movement disorder   • Numbness and tingling   • Sciatica of right side   • Chorea     Past Medical History:   Diagnosis Date   • Atypical chest pain    • Hypertension    • Sciatica      Past Surgical History:   Procedure Laterality Date   • CALCANEOUS OPEN REDUCTION INTERNAL FIXATION Bilateral    • CHOLECYSTECTOMY     • HERNIA REPAIR            SLP EVALUATION (last 72 hours)      SLP Evaluation       17 1350                Rehab Evaluation    Document Type  evaluation  -TM        Subjective Information no complaints  -TM        General Information    Patient Profile Review yes  -TM        Onset of Illness/Injury 11/01/17  -TM        Subjective Patient Observations Pt was alert at time of ST arrival, cooperative, impulsive communication re: current reason for hospital admit and frustrations surrounding concerns.   -TM        Pertinent History Of Current Problem MRI brain 11/02/2017 showed no acute infarct.   -TM        Precautions/Limitations, Vision WFL  -TM        Precautions/Limitations, Hearing WFL  -TM        Prior Level of Function- Communication functional in all spheres  -TM        Plans/Goals Discussed With patient;family;other   Pt and pt's sister, Yvrose.   -TM        Barriers to Rehab none identified  -TM        Living Environment    Lives With alone  -TM        Living Arrangements mobile home  -TM        Clinical Impression    Date of Referral to SLP 11/02/17  -TM        SLP Diagnosis Moderate dysarthria, cognition WFL.  -TM        Functional Level At Time Of Evaluation impaired  -TM        Patient's Goals For Discharge return home  -TM        Family Goals For Discharge family did not state  -TM        Criteria for Skilled Therapeutic Interventions Met current level of function same as previous level of function  -TM        Anticipated Discharge Disposition home with assist  -TM        Cognitive Assessment/Intervention    Current Cognitive/Communication Assessment impaired  -TM        Orientation Status oriented x 4  -TM        Communication Assessment/Intervention    Communication Assessment Dysarthria;Receptive Aphasia;Expressive Aphasia  -TM        Additional Documentation Auditory Comprehen Assess/Intervention (Group);Motor Speech Assessment/Intervention (Group);Verbal Expression Assess/Intervention (Group)  -TM        Auditory Comprehen Assess/Intervention    Auditory Comprehension WNL/WFL  -TM        Auditory Comprehen Assess/Intervention    Answers  "Yes/No Questions WNL/WFL  -TM        Able to Follow Commands WNL/WFL  -TM        Verbal Expression Assess/Intervention    Conversational Speech WNL/WFL  -TM        Motor Speech Assess/Intervention    Motor Speech-Dysarthria Observations slurred speech  -TM        Motor Speech-Dysarthria Observations Comment Pt was noted to have moderately slurred speech, which changed to only mildly impaired at times. Pt presented with increased rate of verbalizations, felt to further decrease speech intelligibility. Pt attributed imprecise articulation to absence of dentures in oral cavity, yet stated speech and cognitive fx appeared to be his baseline.   -TM        Motor Speech- Dysarthria successful, words;successful, phrase;impaired, sentence  -TM        Motor Speech- Dysarthria Comment See above comment for \"Motor Speech-Dysarthria Observations\".  -TM          User Key  (r) = Recorded By, (t) = Taken By, (c) = Cosigned By    Initials Name Effective Dates    TM Arpita Stallings CCC-SLP 08/02/16 -            EDUCATION  The patient has been educated in the following areas:   Cognitive Impairment Communication Impairment.    SLP Recommendation and Plan  SLP Diagnosis: Moderate dysarthria, cognition WFL.        Criteria for Skilled Therapeutic Interventions Met: current level of function same as previous level of function  Anticipated Discharge Disposition: home with assist                Plan of Care Review  Plan Of Care Reviewed With: patient, sibling, other (see comments) (Pt and pt's sister, Yvrose.)  Progress:  (Eval )  Outcome Summary/Follow up Plan: Speech-language/cognitive eval completed. Pt was noted to have moderately slurred speech, which changed to only mildly impaired at times. Pt presented with increased rate of verbalizations, felt to further decrease speech intelligibility. Pt attributed imprecise articulation to absence of dentures in oral cavity, yet stated speech and cognitive fx appeared to be his baseline. Pt's " sister was present and also feel pt's fx at baseline. During eval, pt presented with fluent expressive language, with occasional, brief word finding difficulty at the conversational level. No concerns with receptive language fx. Given that pt and sister do not feel pt speech-language and/or cognitive fx are variant from the pt's baseline, ST feels continued services are not warranted at this time. MD to re-consult if change or new concerns. Thanks!            SLP Outcome Measures (last 72 hours)      SLP Outcome Measures       11/02/17 1350          SLP Outcome Measures    Outcome Measure Used? Adult NOMS  -TM      FCM Scores    FCM Chosen Motor Speech  -TM      Motor Speech FCM Score 5  -TM        User Key  (r) = Recorded By, (t) = Taken By, (c) = Cosigned By    Initials Name Effective Dates    TM DAVIAN Le 08/02/16 -           Time Calculation:         Time Calculation- SLP       11/02/17 1548 11/02/17 0904       Time Calculation- SLP    SLP Start Time 1350  -TM 0818  -TM     SLP Stop Time 1500  -TM 0826  -TM     SLP Time Calculation (min) 70 min  -TM 8 min  -TM     SLP Non-Billable Time (min)  8 min  -TM     SLP Received On 11/02/17  -TM        User Key  (r) = Recorded By, (t) = Taken By, (c) = Cosigned By    Initials Name Provider Type    TM DAVIAN Le Speech and Language Pathologist          Therapy Charges for Today     Code Description Service Date Service Provider Modifiers Qty    50318714998 HC ST CARE PLAN 15 MIN 11/2/2017 DAVIAN Le GN 1    38512763752 HC ST MOTOR SPEECH CURRENT 11/2/2017 DAVIAN Le GN,  1    51783348032 HC ST MOTOR SPEECH GOAL STATUS 11/2/2017 DAVIAN Le GN, CJ 1    96058420575 HC ST MOTOR SPEECH DISCHARGE 11/2/2017 DAVIAN Le, CJ 1    89999633833 HC ST EVAL SPEECH AND PROD W LANG  5 11/2/2017 DAVIAN Le GN 1             ADULT NOMS (last 72 hours)      Adult NOMS       11/02/17 1350                 FCM Scores    FCM Chosen Motor Speech  -        Motor Speech FCM Score 5  -TM          User Key  (r) = Recorded By, (t) = Taken By, (c) = Cosigned By    Initials Name Effective Dates    TM DAVIAN Le 08/02/16 -         SLP G-Codes  SLP NOMS Used?: Yes  Functional Limitations: Motor speech  Motor Speech Current Status (): At least 20 percent but less than 40 percent impaired, limited or restricted  Motor Speech Goal Status (): At least 20 percent but less than 40 percent impaired, limited or restricted  Motor Speech Discharge Status (): At least 20 percent but less than 40 percent impaired, limited or restricted    SLP Discharge Summary  Anticipated Discharge Disposition: home with assist  Reason for Discharge: At baseline function  Outcomes Achieved:  (Eval only. )  Discharge Destination: Home with assist    DAVIAN Zurita  11/2/2017

## 2017-11-02 NOTE — PROGRESS NOTES
[facility] Medicine Services  INPATIENT PROGRESS NOTE    Length of Stay: 0  Date of Admission: 11/1/2017  Primary Care Physician: Ruben Alegria MD    Subjective   Chief Complaint:  Follow up  HPI   Patient came to ER as code stroke  He complains of neck pain and weakness.  MRI of neck and brain were requested including neurology consult    Patient sometime in June 207 was documented to have seen Dr. Mccord in ER for similar complain of numbness and tingling, popping in his neck,  He has had CT of cevical spine, head ct (in June) and LS and TS spine CT (in Aug)    As lifted from Dr. Mccord June 27 consult in ER   1.  Full body paresthesias  2.  Benign movement disorder     Plan     Overall the patient has a normal neurologic exam.  His history of full body paresthesias after a neck pop would not localize well.  There would be no pathology occurring in the cervical spine that could cause facial paresthesias.  This is more likely consistent with an anxiety disorder and possibly some hyperventilation causing a low CO2 which could in turn cause full body paresthesias.  He has no neurologic deficits on examination.  There is no evidence of a spinal level on examination.  At this point in time no further neurologic workup is suggested.  He is instructed to go home and rest.  If any progression occurs he is to return immediately.  In regards to his movement disorder he refuses further outpatient neurology workup.     Patient just came back from imaging.   He claimed that his neck numbness and pain is improve.  He claimed to have right lower extremity numbness too and occ has pain radiating down.    He follows with Dr. Balta Alegria.     Review of Systems     All pertinent negatives and positives are as above. All other systems have been reviewed and are negative unless otherwise stated.     Objective    Temp:  [98 °F (36.7 °C)-98.8 °F (37.1 °C)] 98.2 °F (36.8 °C)  Heart  "Rate:  [57-81] 62  Resp:  [16-19] 16  BP: (135-178)/(77-93) 135/78  Physical Exam  He is \"pill rolling\" his fingers, He mumbles his words but otherwise understandable enough.  He was able to stand from the wheelchair and went to bathroom by himself and shows stable gait.   He was able to get back on his bed w/o any assistance.   normocephalic and atraumatic   Moist oral mucosa  No thyromegaly  chest CTA  s1 s2 rrr  Soft abdomen, NABS, non tender  No c/c/e      Results Review:  I have reviewed the labs, radiology results, and diagnostic studies.    Laboratory Data:     Results from last 7 days  Lab Units 11/01/17  2135   WBC 10*3/mm3 6.71   HEMOGLOBIN g/dL 13.8*   HEMATOCRIT % 39.9*   PLATELETS 10*3/mm3 110*          Results from last 7 days  Lab Units 11/01/17  2135   SODIUM mmol/L 143   POTASSIUM mmol/L 4.3   CHLORIDE mmol/L 105   CO2 mmol/L 27.0   BUN mg/dL 21   CREATININE mg/dL 1.10   CALCIUM mg/dL 9.6   BILIRUBIN mg/dL 0.4   ALK PHOS U/L 52   ALT (SGPT) U/L 39   AST (SGOT) U/L 30   GLUCOSE mg/dL 91       Culture Data:        Radiology Data:   Imaging Results (last 24 hours)     Procedure Component Value Units Date/Time    CT Head Without Contrast [[id number]] Collected:  11/01/17 2200     Updated:  11/01/17 2205    Narrative:       CT HEAD WO CONTRAST- 11/1/2017 9:42 PM CDT     HISTORY: neck pain, jo UE and LE numbness     COMPARISON: 6/27/17      DOSE LENGTH PRODUCT: 747 mGy cm. Automated exposure control was also  utilized to decrease patient radiation dose.     TECHNIQUE: Helical tomographic images of the brain were obtained without  the use of contrast. Multiplanar reformatted images were provided for  review.     FINDINGS:   The midline structures are nondisplaced. There is mild cerebral and  cerebellar atrophy, with an associated increase in the prominence of the  ventricles and sulci. The basilar cisterns are normal in size and  configuration. There is no evidence of intracranial hemorrhage " or  mass-effect. There is low attenuation in the periventricular white  matter, consistent with chronic ischemic change. There are no abnormal  extra-axial fluid collections. There is no evidence of tonsillar  herniation.      The included orbits and their contents are unremarkable. The visualized  paranasal sinuses, mastoid air cells and middle ear cavities are clear.  The visualized osseous structures and overlying soft tissues of the  skull and face are intact.        Impression:       Mild cerebral and cerebellar atrophy with chronic microvascular disease  but no evidence of acute intracranial process.        This report was finalized on 11/01/2017 22:02 by Dr. Fredy Prince MD.    CT Cervical Spine Without Contrast [[id number]] Collected:  11/01/17 2205     Updated:  11/01/17 2209    Narrative:       CT CERVICAL SPINE WO CONTRAST- 11/1/2017 9:42 PM CDT     HISTORY: Bilateral upper and lower extremity numbness following a fall.     COMPARISON: None      DOSE LENGTH PRODUCT: 262 mGy cm. Automated exposure control was also  utilized to decrease patient radiation dose.     TECHNIQUE: Serial helical tomographic images of the cervical spine were  obtained without the use of intravenous contrast. Additionally, sagittal  and coronal reformatted images were also provided for review.      FINDINGS:   Multilevel degenerative changes are seen in the cervical spine. There is  no evidence of acute fracture or subluxation. The prevertebral soft  tissues are within normal limits. The posterior elements are intact.  Vertebral body heights are maintained.     The visualized skull base is intact. The visualized thorax demonstrates  no acute abnormality.       Impression:       1. Degenerative changes without evidence of acute osseous injury in the  cervical spine.     This report was finalized on 11/01/2017 22:06 by Dr. Fredy Prince MD.    XR Chest 1 View [[id number]] Collected:  11/02/17 0705     Updated:  11/02/17 0709     Narrative:       EXAMINATION: XR CHEST 1 VW-. 11/2/2017 7:05 AM CDT     CHEST, ONE VIEW:     HISTORY: Neck pain. Upper and lower extremity numbness     COMPARISON: 1/16/2017 and 1/15/2017     A single frontal chest radiograph was obtained.     FINDINGS:     The lungs are clear without infiltrates.     The heart is normal in size, pulmonary circulation appropriate, without  heart failure.     The bony structures are intact. Multiple old right rib fractures noted.     Radiographically, chest is unchanged.                                     Impression:       1. No acute cardiopulmonary process.     This report was finalized on 11/02/2017 07:06 by Dr. Ace Abel MD.    MRI Brain Without Contrast [[id number]] Updated:  11/02/17 0739    MRI Cervical Spine Without Contrast [[id number]] Updated:  11/02/17 0739          I have reviewed the patient current medications.     Assessment/Plan     Hospital Problem List     Numbness and tingling   History of neck pain        MRI of neck and brain pending  Awaiting neurology consult  CT of cervical spine showed degenerative changes w/o evidence of acute bony injury   Head CT showed cerebral and cerebella atrophy with chronic microvascular changes w/o acute intracranial process    Anticipate may likely be able to go home once we have more info form the w/u  and  when cleared by neurologist.  He is advised to follow with Dr. Balta Bhatt MD   11/02/17   8:30 AM

## 2017-11-02 NOTE — SIGNIFICANT NOTE
Attempted to visit pt this AM. Upon ST arrival with plan to complete speech-language/cognitive eval per stroke orders, pt was out of room. Accessed hands off communication form indicating pt was off unit for MRI of brain. Will follow up later today to complete eval.      11/02/17 0904   Time Calculation- SLP   SLP Start Time 0818   SLP Stop Time 0826   SLP Time Calculation (min) 8 min   SLP Non-Billable Time (min) 8 min

## 2017-11-02 NOTE — CONSULTS
"Neurology Consult Note    Referring Provider: Dr. Cameron Bhatt  Reason for Consultation: CVA      History of present illness:      This is a 77-year-old  male who has been seen 2 times previously this year by neurology.  He presents a third time for an extremely similar presentation.  He tells me that 2 days ago he was sitting in a chair and had his neck \"pop.\"  Afterwards he felt numbness and paresthesias throughout his entire body.  The duration of this sounds as if it were only several minutes.  Since that time he has noticed severe pain and numbness of his right lower extremity when he sits on his bottom.  Specifically he notices it when he sits on the toilet seat a certain way.  He has tenderness upon palpation of his right buttocks.  He also has re-creation of the pain with straight leg extension.  He denies current neck pain.  He denies upper extreme the pain.  He denies any bulbar symptomatology.  Past Medical History:   Diagnosis Date   • Atypical chest pain    • Hypertension    • Sciatica        No Known Allergies  No current facility-administered medications on file prior to encounter.      Current Outpatient Prescriptions on File Prior to Encounter   Medication Sig   • aspirin 81 MG EC tablet Take 81 mg by mouth Daily.   • cyclobenzaprine (FLEXERIL) 10 MG tablet Take 1 tablet by mouth 3 (Three) Times a Day.   • HYDROcodone-acetaminophen (NORCO) 7.5-325 MG per tablet Take 1 tablet by mouth Every 6 (Six) Hours As Needed for Moderate Pain (4-6).   • ketorolac (TORADOL) 10 MG tablet Take 1 tablet by mouth Every 6 (Six) Hours As Needed for Moderate Pain (4-6).   • lisinopril (PRINIVIL,ZESTRIL) 10 MG tablet Take 5 mg by mouth Daily.   • MethylPREDNISolone (MEDROL, OWEN,) 4 MG tablet Take as directed on package instructions.   • pseudoephedrine-guaifenesin (MUCINEX D)  MG per 12 hr tablet Take 1 tablet by mouth Every 12 (Twelve) Hours.       Social History     Social History   • Marital " status: Single     Spouse name: N/A   • Number of children: N/A   • Years of education: N/A     Occupational History   • Not on file.     Social History Main Topics   • Smoking status: Former Smoker     Packs/day: 1.00     Years: 30.00   • Smokeless tobacco: Not on file   • Alcohol use No   • Drug use: No   • Sexual activity: Defer     Other Topics Concern   • Not on file     Social History Narrative     Family History   Problem Relation Age of Onset   • Cancer Mother    • No Known Problems Father    • No Known Problems Sister    • No Known Problems Brother    • No Known Problems Sister    • No Known Problems Brother        Review of Systems  A 14 point review of systems was reviewed and was negative except for right leg pain    Vital Signs   Temp:  [98 °F (36.7 °C)-98.8 °F (37.1 °C)] 98.1 °F (36.7 °C)  Heart Rate:  [57-81] 70  Resp:  [16-19] 16  BP: (135-178)/(77-98) 143/90    General Exam:  Head:  Normal cephalic, atraumatic  HEENT:  Neck supple  Fundoscopic Exam:  No signs of disc edema  CVS:  Regular rate and rhythm.  No murmurs  Carotid Examination:  No bruits  Lungs:  Clear to auscultation  Abdomen:  Non-tender, Non-distended  Extremities:  No signs of peripheral edema    On straight leg raise of the right leg the patient has re-creation of his pain.  The pain begins in his right buttock and radiates in the posterior aspects of his leg down towards his foot.  Skin:  No rashes    Neurologic Exam:    Mental Status:    -The patient is awake alert and oriented ×3.  He follows simple and complex commands.  His speech is extremely transient potential.  He asked the same questions over and over again and despite me giving him responses he repeats the question.  He does not seem to follow logic very well.    CN II:  Visual fields full.  Pupils equally reactive to light  CN III, IV, VI:  Extraocular Muscles full with no signs of nystagmus  CN V:  Facial sensory is symmetric with no asymetries.  CN VII:  Facial motor  symmetric  CN VIII:  Gross hearing intact bilaterally  CN IX:  Palate elevates symmetrically  CN X:  Palate elevates symmetrically  CN XI:  Shoulder shrug symmetric  CN XII:  Tongue is midline on protrusion    Motor: (strength out of 5:  1= minimal movement, 2 = movement in plane of gravity, 3 = movement against gravity, 4 = movement against some resistance, 5 = full strength)    -Right Upper Ext: Proximal: 5 Distal: 5  -Left Upper Ext: Proximal: 5 Distal: 5    -Right Lower Ext: Proximal: 5 Distal: 5  -Left Lower Ext: Proximal: 5 Distal: 5    DTR:  -Right   Bicep: 2+ Tricep: 2+ Brachoradialis: 2+   Patella: 2+ Ankle: 2+ Neg Babinski  -Left   Bicep: 2+ Tricep: 2+ Brachoradialis: 2+   Patella: 2+ Ankle: 2+ Neg Babinski    Sensory:  -Intact to light touch, pinprick, temperature, pain, and proprioception    Coordination:  -Choreiform movements noted throughout his 4 extremities.  This seems to worsen with stress.    Gait  -No signs of ataxia  -ambulates unassisted      Results Review:  Lab Results (last 24 hours)     Procedure Component Value Units Date/Time    Protime-INR [[id number]]  (Normal) Collected:  11/01/17 2135    Specimen:  Blood Updated:  11/01/17 2153     Protime 14.0 Seconds      INR 1.05    aPTT [[id number]]  (Normal) Collected:  11/01/17 2135    Specimen:  Blood Updated:  11/01/17 2153     PTT 32.4 seconds     Comprehensive Metabolic Panel [[id number]] Collected:  11/01/17 2135    Specimen:  Blood Updated:  11/01/17 2158     Glucose 91 mg/dL      BUN 21 mg/dL      Creatinine 1.10 mg/dL      Sodium 143 mmol/L      Potassium 4.3 mmol/L      Chloride 105 mmol/L      CO2 27.0 mmol/L      Calcium 9.6 mg/dL      Total Protein 7.5 g/dL      Albumin 4.20 g/dL      ALT (SGPT) 39 U/L      AST (SGOT) 30 U/L      Alkaline Phosphatase 52 U/L      Total Bilirubin 0.4 mg/dL      eGFR Non African Amer 65 mL/min/1.73      Globulin 3.3 gm/dL      A/G Ratio 1.3 g/dL      BUN/Creatinine Ratio 19.1     Anion Gap 11.0 mmol/L      Narrative:       The MDRD GFR formula is only valid for adults with stable renal function between ages 18 and 70.    C-reactive Protein [[id number]]  (Normal) Collected:  11/01/17 2135    Specimen:  Blood Updated:  11/01/17 2158     C-Reactive Protein <0.50 mg/dL     Troponin [[id number]]  (Normal) Collected:  11/01/17 2135    Specimen:  Blood Updated:  11/01/17 2206     Troponin I <0.012 ng/mL     CBC Auto Differential [[id number]]  (Abnormal) Collected:  11/01/17 2135    Specimen:  Blood Updated:  11/01/17 2241     WBC 6.71 10*3/mm3      RBC 4.55 (L) 10*6/mm3      Hemoglobin 13.8 (L) g/dL      Hematocrit 39.9 (L) %      MCV 87.7 fL      MCH 30.3 pg      MCHC 34.6 g/dL      RDW 14.4 %      RDW-SD 46.4 fl      MPV 10.9 fL      Platelets 110 (L) 10*3/mm3     CBC & Differential [[id number]] Collected:  11/01/17 2135    Specimen:  Blood Updated:  11/01/17 2241    Narrative:       The following orders were created for panel order CBC & Differential.  Procedure                               Abnormality         Status                     ---------                               -----------         ------                     Manual Differential[[id number]]          Abnormal            Final result               Scan Slide[[id number]]                                       Final result               CBC Auto Differential[[id number]]        Abnormal            Final result                 Please view results for these tests on the individual orders.    Scan Slide [[id number]] Collected:  11/01/17 2135    Specimen:  Blood Updated:  11/01/17 2241     Scan Slide --      See Manual Differential Results       Manual Differential [[id number]]  (Abnormal) Collected:  11/01/17 2135    Specimen:  Blood Updated:  11/01/17 2241     Neutrophil % 35.0 (L) %      Lymphocyte % 23.0 %      Monocyte % 31.0 (H) %      Bands %  4.0 %      Atypical Lymphocyte % 7.0 (H) %      Neutrophils Absolute 2.62 10*3/mm3      Lymphocytes Absolute 1.54 10*3/mm3       Monocytes Absolute 2.08 (H) 10*3/mm3      nRBC 1.0 (H) /100 WBC      RBC Morphology Normal     WBC Morphology Normal     Platelet Morphology Normal    Urine Culture - Urine, Urine, Clean Catch [[id number]] Collected:  11/02/17 0048    Specimen:  Urine from Urine, Clean Catch Updated:  11/02/17 0057    Urinalysis With / Culture If Indicated - Urine, Clean Catch [[id number]]  (Abnormal) Collected:  11/02/17 0048    Specimen:  Urine from Urine, Clean Catch Updated:  11/02/17 0127     Color, UA Yellow     Appearance, UA Cloudy (A)     pH, UA 6.5     Specific Gravity, UA 1.021     Glucose, UA Negative     Ketones, UA Negative     Bilirubin, UA Negative     Blood, UA Negative     Protein, UA Negative     Leuk Esterase, UA Trace (A)     Nitrite, UA Negative     Urobilinogen, UA 1.0 E.U./dL    Urinalysis, Microscopic Only - Urine, Clean Catch [[id number]]  (Abnormal) Collected:  11/02/17 0048    Specimen:  Urine from Urine, Clean Catch Updated:  11/02/17 0127     RBC, UA 3-5 (A) /HPF      WBC, UA 3-5 (A) /HPF      Bacteria, UA None Seen /HPF      Squamous Epithelial Cells, UA 3-6 (A) /HPF      Hyaline Casts, UA None Seen /LPF      Granular Casts, UA 0-2 /LPF      Amorphous Crystals, UA Moderate/2+ /HPF      Methodology Manual Light Microscopy    Reticulocytes [[id number]]  (Normal) Collected:  11/02/17 0159    Specimen:  Blood Updated:  11/02/17 0218     Reticulocyte % 0.81 %      Reticulocyte Absolute 0.0378 10*6/mm3     Reticulocytes [[id number]]  (Normal) Collected:  11/02/17 0159    Specimen:  Blood Updated:  11/02/17 0219     Reticulocyte % 0.81 %      Reticulocyte Absolute 0.0371 10*6/mm3     C-reactive Protein [[id number]]  (Normal) Collected:  11/02/17 0159    Specimen:  Blood Updated:  11/02/17 0230     C-Reactive Protein <0.50 mg/dL     Iron Profile [[id number]]  (Normal) Collected:  11/02/17 0159    Specimen:  Blood Updated:  11/02/17 0236     Iron 64 mcg/dL      TIBC 297 mcg/dL      Iron Saturation 22 %     Lipid Panel  [[id number]]  (Abnormal) Collected:  11/02/17 0159    Specimen:  Blood Updated:  11/02/17 0238     Total Cholesterol 202 (H) mg/dL      Triglycerides 137 mg/dL      HDL Cholesterol 37 (L) mg/dL      LDL Cholesterol  155 (H) mg/dL      LDL/HDL Ratio 3.72    Sedimentation Rate [[id number]]  (Normal) Collected:  11/02/17 0159    Specimen:  Blood Updated:  11/02/17 0241     Sed Rate <1 mm/hr     Ferritin [[id number]]  (Normal) Collected:  11/02/17 0159    Specimen:  Blood Updated:  11/02/17 0306     Ferritin 190.00 ng/mL     Vitamin B12 [[id number]]  (Normal) Collected:  11/02/17 0159    Specimen:  Blood Updated:  11/02/17 0319     Vitamin B-12 490 pg/mL     Hemoglobin A1c [[id number]] Collected:  11/02/17 0159    Specimen:  Blood Updated:  11/02/17 0322     Hemoglobin A1C 5.6 %     Narrative:       Less than 6.0           Non-Diabetic Range  6.0-7.0                 ADA Therapeutic Target  Greater than 7.0        Action Suggested    Ferritin [[id number]] Collected:  11/02/17 0159    Specimen:  Blood Updated:  11/02/17 0442     Ferritin -- ng/mL       Disregard previous results; duplicate test ordered    Corrected result. Previous result was 185.00 ng/mL on 11/2/2017 at 0307 CDT       Folate [[id number]] Collected:  11/02/17 0159    Specimen:  Blood Updated:  11/02/17 0442     Folate -- ng/mL       Disregard previous results; duplicated testing ordered  Corrected result. Previous result was 8.93 ng/mL on 11/2/2017 at 0337 CDT       Iron Profile [[id number]] Collected:  11/02/17 0159    Specimen:  Blood Updated:  11/02/17 0444     Iron -- mcg/dL       Disregard previous results; duplicate testing ordered  Corrected result. Previous result was 76 mcg/dL on 11/2/2017 at 0237 CDT        TIBC -- mcg/dL       Disregard previous testing; duplicate testing ordered  Corrected result. Previous result was 299 mcg/dL on 11/2/2017 at 0237 CDT        Iron Saturation -- %       Disregard previous testing; duplicate testing ordered  Corrected  result. Previous result was 25 % on 11/2/2017 at 0237 CDT       Vitamin B12 [[id number]] Collected:  11/02/17 0159    Specimen:  Blood Updated:  11/02/17 0445     Vitamin B-12 -- pg/mL       Disregard previous results; duplicate testing ordered   Corrected result. Previous result was 509 pg/mL on 11/2/2017 at 0337 CDT       Folate [[id number]]  (Normal) Collected:  11/02/17 0159    Specimen:  Blood Updated:  11/02/17 0453     Folate 9.06 ng/mL           .  Imaging Results (last 24 hours)     Procedure Component Value Units Date/Time    CT Head Without Contrast [[id number]] Collected:  11/01/17 2200     Updated:  11/01/17 2205    Narrative:       CT HEAD WO CONTRAST- 11/1/2017 9:42 PM CDT     HISTORY: neck pain, jo UE and LE numbness     COMPARISON: 6/27/17      DOSE LENGTH PRODUCT: 747 mGy cm. Automated exposure control was also  utilized to decrease patient radiation dose.     TECHNIQUE: Helical tomographic images of the brain were obtained without  the use of contrast. Multiplanar reformatted images were provided for  review.     FINDINGS:   The midline structures are nondisplaced. There is mild cerebral and  cerebellar atrophy, with an associated increase in the prominence of the  ventricles and sulci. The basilar cisterns are normal in size and  configuration. There is no evidence of intracranial hemorrhage or  mass-effect. There is low attenuation in the periventricular white  matter, consistent with chronic ischemic change. There are no abnormal  extra-axial fluid collections. There is no evidence of tonsillar  herniation.      The included orbits and their contents are unremarkable. The visualized  paranasal sinuses, mastoid air cells and middle ear cavities are clear.  The visualized osseous structures and overlying soft tissues of the  skull and face are intact.        Impression:       Mild cerebral and cerebellar atrophy with chronic microvascular disease  but no evidence of acute intracranial process.         This report was finalized on 11/01/2017 22:02 by Dr. Fredy Prince MD.    CT Cervical Spine Without Contrast [[id number]] Collected:  11/01/17 2205     Updated:  11/01/17 2209    Narrative:       CT CERVICAL SPINE WO CONTRAST- 11/1/2017 9:42 PM CDT     HISTORY: Bilateral upper and lower extremity numbness following a fall.     COMPARISON: None      DOSE LENGTH PRODUCT: 262 mGy cm. Automated exposure control was also  utilized to decrease patient radiation dose.     TECHNIQUE: Serial helical tomographic images of the cervical spine were  obtained without the use of intravenous contrast. Additionally, sagittal  and coronal reformatted images were also provided for review.      FINDINGS:   Multilevel degenerative changes are seen in the cervical spine. There is  no evidence of acute fracture or subluxation. The prevertebral soft  tissues are within normal limits. The posterior elements are intact.  Vertebral body heights are maintained.     The visualized skull base is intact. The visualized thorax demonstrates  no acute abnormality.       Impression:       1. Degenerative changes without evidence of acute osseous injury in the  cervical spine.     This report was finalized on 11/01/2017 22:06 by Dr. Fredy Prince MD.    XR Chest 1 View [[id number]] Collected:  11/02/17 0705     Updated:  11/02/17 0709    Narrative:       EXAMINATION: XR CHEST 1 VW-. 11/2/2017 7:05 AM CDT     CHEST, ONE VIEW:     HISTORY: Neck pain. Upper and lower extremity numbness     COMPARISON: 1/16/2017 and 1/15/2017     A single frontal chest radiograph was obtained.     FINDINGS:     The lungs are clear without infiltrates.     The heart is normal in size, pulmonary circulation appropriate, without  heart failure.     The bony structures are intact. Multiple old right rib fractures noted.     Radiographically, chest is unchanged.                                     Impression:       1. No acute cardiopulmonary process.     This report was  finalized on 11/02/2017 07:06 by Dr. Ace Abel MD.    MRI Brain Without Contrast [[id number]] Collected:  11/02/17 0825     Updated:  11/02/17 0833    Narrative:          History:  77-year-old evaluated for stroke. Right-sided leg weakness.      Reference   CT head 1 day previous. MRI brain January 2017.     Technique  Routine unenhanced brain MRI.     Findings  There is no area of restricted diffusion to suggest acute infarction. No  extra-axial fluid collection. No chronic cortical infarction is  observed. There is generalized atrophy with commensurate ventricular  enlargement. No convincing hydrocephalus. Mild microangiopathy of the  periventricular white matter.     The major vascular flow voids are maintained. Mucosal thickening left  maxillary sinus.          Impression:       Impression:  No acute infarction, hemorrhage, or mass.        This report was finalized on 11/02/2017 08:30 by  Vin Medrano, .    MRI Cervical Spine Without Contrast [[id number]] Collected:  11/02/17 0835     Updated:  11/02/17 0848    Narrative:       EXAMINATION: MRI CERVICAL SPINE WO CONTRAST- 11/2/2017 9:35 AM EDT     HISTORY: Hand and leg numbness, neck pain     COMPARISON: CT cervical spine without contrast 11/01/2017     Technical: Multiplanar, multisequence imaging was performed through the  cervical spine without the use of IV contrast.     FINDINGS:  The images are somewhat degraded by patient motion, which limits  evaluation.     The visualized portions of the posterior fossa and brainstem appear  grossly normal.     Review of the visualized paraspinal soft tissues demonstrates no gross  abnormalities.     Alignment of the cervical spine appears normal. Vertebral body heights  appear well maintained. No infiltrative marrow process is identified.  There appears to be some endplate osteophyte formation throughout the  cervical spine, compatible with degenerative changes.     The visualized spinal cord appears normal in signal.      Disc levels:  C2-C3: Minimal posterior osteophyte complex without thecal sac stenosis  or neuroforaminal narrowing.     C3-C4: Posterior disc osteophyte complex and uncovertebral hypertrophy  result in effacement of the thecal side with mild to moderate thecal sac  stenosis. There does appear to be severe right neuroforaminal narrowing.     C4-C5: Posterior disc osteophyte complex and uncovertebral hypertrophy  result in some effacement of the thecal side without significant thecal  sac stenosis. There is mild to moderate bilateral neuroforaminal  narrowing.     C5-C6: Minimal degenerative disc disease without significant thecal sac  stenosis. Uncovertebral hypertrophy result in moderate to severe right  neuroforaminal narrowing with mild left neuroforaminal narrowing.     C6-C7: Minimal uncovertebral hypertrophy without significant thecal sac  stenosis. There is moderate bilateral neural foraminal narrowing.     C7-T1: No significant degenerative disc disease, thecal sac stenosis, or  neuroforaminal narrowing.       Impression:       Degenerative changes of the cervical spine which appear most  pronounced at C3-C4. A right lateral posterior osteophyte at this  location results in mild to moderate thecal sac stenosis as well as  severe right neuroforaminal narrowing. Neuroforaminal narrowing is also  noted at multiple additional levels of the cervical spine.  This report was finalized on 11/02/2017 08:45 by Dr. Sinan Manriquez MD.    US Carotid Bilateral [[id number]] Updated:  11/02/17 0854        MR of brain without contrast reviewed by me.  There are no signs of an acute stroke.  FLAIR sequencing is unremarkable as well with no signs of significant microvascular disease.    MR cervical spine without contrast reviewed by me.  There is multi level disc disease specifically at C3-C4 but no significant cord compression.  There are no signal changes in the cord as well.  There is some neural foraminal narrowing at this  "level on the right compared to the left.  I would rate it as somewhat significant.    Carotid ultrasound reviewed by me showing no significant stenoses    Cardiac echo pending.  Impression    1.  Right sciatica  2.  Cervical stenoses found radiographically but does not clinically fit with his presentation  3.  Benign choreiform movements    Discussion: I did attempt to  the patient many times that I believe his neck popping and paresthesias are benign.  The imaging of his brain and cervical cord show no evidence of a central lesion and specifically show no evidence of a cervical myelopathy.  There is some radiographic evidence of a C3-C4 radiculopathy but clinically and on exam there is no suggestion of this.  The patient does complain of pain consistent with sciatica.  I explained to him the nature of sciatica and that the treatment is high dose nonsteroidal anti-inflammatory medications in addition to stretching exercises.  Despite this he requests many times for me to perform additional x-rays of his hip.  I explained that I had no suspicion for fracture and that sciatica required the above treatments.  Despite this he repeated many times that he just needed a doctor to \"figure it out.\"        Plan    Ibuprofen 600 mg every 6 hours  Provide patient with stretching exercises for right sided sciatica  Follow-up with neurology as needed    I discussed the patients findings and my recommendations with patient    Edgar Mccord MD  11/02/17  2:48 PM      "

## 2017-11-02 NOTE — H&P
[facility]  HISTORY AND PHYSICAL    Date of Admission: 11/1/2017  Primary Care Physician: Ruben Alegria MD    Subjective     Chief Complaint: Arm numbness and leg numbness    History of Present Illness  Patient is a 77-year-old white male with a past medical history of neck pain and some sort of past medical history of progressive neurodegenerative disorder which he does not seem to be aware of.  When I introduced him to my self he said I speak like an Thai meaning I move my arms and legs a lot.  The patient had choreiform motions throughout the entire conversation and has had this for quite some time.  I asked him if he ever seen a neurologist before about it he stated he had not.  He has spent much of his life in [place] as he was  to a [place] woman according to him he states they  and he said that he lives here because of financial reasons and she lives there with her children.  His sister is his POA.  He is a very pleasant man but somewhat difficult historian.  He came in because his neck hurts and he has numbness in his arms and legs and thinks he needs to be evaluated for it.        Review of Systems   14 point review of systems are negative except for as per HPI including no dysphagia no gagging no loss of bowel or bladder control no visual changes no other neurologic abnormalities.  Otherwise complete ROS reviewed and negative except as mentioned in the HPI.    Past Medical History:   Past Medical History:   Diagnosis Date   • Atypical chest pain    • Hypertension    • Sciatica      Past Surgical History:  Past Surgical History:   Procedure Laterality Date   • CALCANEOUS OPEN REDUCTION INTERNAL FIXATION Bilateral    • CHOLECYSTECTOMY     • HERNIA REPAIR       Social History:  reports that he has quit smoking. He has a 30.00 pack-year smoking history. He does not have any smokeless tobacco history on file. He reports that he  "does not drink alcohol or use illicit drugs.    Family History: family history includes Cancer in his mother; No Known Problems in his brother, brother, father, sister, and sister.   He denies any family history of neurodegenerative disorders such as Ezequiel's chorea    Allergies:  No Known Allergies  Medications:  Prior to Admission medications    Medication Sig Start Date End Date Taking? Authorizing Provider   aspirin 81 MG EC tablet Take 81 mg by mouth Daily.   Yes Historical Provider, MD   cyclobenzaprine (FLEXERIL) 10 MG tablet Take 1 tablet by mouth 3 (Three) Times a Day. 8/12/17   Nate Sanchez MD   HYDROcodone-acetaminophen (NORCO) 7.5-325 MG per tablet Take 1 tablet by mouth Every 6 (Six) Hours As Needed for Moderate Pain (4-6). 8/12/17   Nate Sanchez MD   ketorolac (TORADOL) 10 MG tablet Take 1 tablet by mouth Every 6 (Six) Hours As Needed for Moderate Pain (4-6). 8/12/17   Nate Sanchez MD   lisinopril (PRINIVIL,ZESTRIL) 10 MG tablet Take 5 mg by mouth Daily.    Historical Provider, MD   MethylPREDNISolone (MEDROL, OWEN,) 4 MG tablet Take as directed on package instructions. 1/17/17   OSCAR Carter   pseudoephedrine-guaifenesin (MUCINEX D)  MG per 12 hr tablet Take 1 tablet by mouth Every 12 (Twelve) Hours.    Historical Provider, MD     Objective     Vital Signs: /88 (BP Location: Left arm, Patient Position: Lying)  Pulse 58  Temp 98 °F (36.7 °C) (Oral)   Resp 18  Ht 71\" (180.3 cm)  Wt 175 lb (79.4 kg)  SpO2 96%  BMI 24.41 kg/m2  Physical Exam  Constitutional: He is oriented to person, place, and time. He appears well-developed and well-nourished.   HENT:   Head: Normocephalic and atraumatic.   Eyes: Conjunctivae are normal. Pupils are equal, round, and reactive to light.   Neck: Normal range of motion. Neck supple.   Cardiovascular: Normal rate, regular rhythm and normal heart sounds.    Pulmonary/Chest: Effort normal and breath sounds normal.   Abdominal: " Soft. Bowel sounds are normal.   Musculoskeletal: Normal range of motion.   Neurological: He is alert and oriented to person, place, and time. He has normal strength. No cranial nerve deficit. 5/5 strength jo UE and LE  No facial drooping noted, sensory intact   Skin: Skin is warm and dry.   Psychiatric: He has a normal mood and affect.   He however despite the normal exam described above has choreiform movements throughout the entire conversation that he is unable to control.    Results Reviewed:  Lab Results (last 24 hours)     Procedure Component Value Units Date/Time    Protime-INR [[id number]]  (Normal) Collected:  11/01/17 2135    Specimen:  Blood Updated:  11/01/17 2153     Protime 14.0 Seconds      INR 1.05    aPTT [[id number]]  (Normal) Collected:  11/01/17 2135    Specimen:  Blood Updated:  11/01/17 2153     PTT 32.4 seconds     Comprehensive Metabolic Panel [[id number]] Collected:  11/01/17 2135    Specimen:  Blood Updated:  11/01/17 2158     Glucose 91 mg/dL      BUN 21 mg/dL      Creatinine 1.10 mg/dL      Sodium 143 mmol/L      Potassium 4.3 mmol/L      Chloride 105 mmol/L      CO2 27.0 mmol/L      Calcium 9.6 mg/dL      Total Protein 7.5 g/dL      Albumin 4.20 g/dL      ALT (SGPT) 39 U/L      AST (SGOT) 30 U/L      Alkaline Phosphatase 52 U/L      Total Bilirubin 0.4 mg/dL      eGFR Non African Amer 65 mL/min/1.73      Globulin 3.3 gm/dL      A/G Ratio 1.3 g/dL      BUN/Creatinine Ratio 19.1     Anion Gap 11.0 mmol/L     Narrative:       The MDRD GFR formula is only valid for adults with stable renal function between ages 18 and 70.    C-reactive Protein [[id number]]  (Normal) Collected:  11/01/17 2135    Specimen:  Blood Updated:  11/01/17 2158     C-Reactive Protein <0.50 mg/dL     Troponin [[id number]]  (Normal) Collected:  11/01/17 2135    Specimen:  Blood Updated:  11/01/17 2206     Troponin I <0.012 ng/mL     CBC Auto Differential [[id number]]  (Abnormal) Collected:  11/01/17 2135    Specimen:  Blood  Updated:  11/01/17 2241     WBC 6.71 10*3/mm3      RBC 4.55 (L) 10*6/mm3      Hemoglobin 13.8 (L) g/dL      Hematocrit 39.9 (L) %      MCV 87.7 fL      MCH 30.3 pg      MCHC 34.6 g/dL      RDW 14.4 %      RDW-SD 46.4 fl      MPV 10.9 fL      Platelets 110 (L) 10*3/mm3     CBC & Differential [[id number]] Collected:  11/01/17 2135    Specimen:  Blood Updated:  11/01/17 2241    Narrative:       The following orders were created for panel order CBC & Differential.  Procedure                               Abnormality         Status                     ---------                               -----------         ------                     Manual Differential[[id number]]          Abnormal            Final result               Scan Slide[[id number]]                                       Final result               CBC Auto Differential[[id number]]        Abnormal            Final result                 Please view results for these tests on the individual orders.    Scan Slide [[id number]] Collected:  11/01/17 2135    Specimen:  Blood Updated:  11/01/17 2241     Scan Slide --      See Manual Differential Results       Manual Differential [[id number]]  (Abnormal) Collected:  11/01/17 2135    Specimen:  Blood Updated:  11/01/17 2241     Neutrophil % 35.0 (L) %      Lymphocyte % 23.0 %      Monocyte % 31.0 (H) %      Bands %  4.0 %      Atypical Lymphocyte % 7.0 (H) %      Neutrophils Absolute 2.62 10*3/mm3      Lymphocytes Absolute 1.54 10*3/mm3      Monocytes Absolute 2.08 (H) 10*3/mm3      nRBC 1.0 (H) /100 WBC      RBC Morphology Normal     WBC Morphology Normal     Platelet Morphology Normal    Urinalysis With / Culture If Indicated - Urine, Clean Catch [[id number]] Collected:  11/02/17 0048    Specimen:  Urine from Urine, Clean Catch Updated:  11/02/17 0051    Urinalysis, Microscopic Only - Urine, Clean Catch [[id number]] Collected:  11/02/17 0048    Specimen:  Urine from Urine, Clean Catch Updated:  11/02/17 0055    Urine Culture  - Urine, Urine, Clean Catch [[id number]] Collected:  11/02/17 0048    Specimen:  Urine from Urine, Clean Catch Updated:  11/02/17 0057        Imaging Results (last 24 hours)     Procedure Component Value Units Date/Time    CT Head Without Contrast [[id number]] Collected:  11/01/17 2200     Updated:  11/01/17 2205    Narrative:       CT HEAD WO CONTRAST- 11/1/2017 9:42 PM CDT     HISTORY: neck pain, jo UE and LE numbness     COMPARISON: 6/27/17      DOSE LENGTH PRODUCT: 747 mGy cm. Automated exposure control was also  utilized to decrease patient radiation dose.     TECHNIQUE: Helical tomographic images of the brain were obtained without  the use of contrast. Multiplanar reformatted images were provided for  review.     FINDINGS:   The midline structures are nondisplaced. There is mild cerebral and  cerebellar atrophy, with an associated increase in the prominence of the  ventricles and sulci. The basilar cisterns are normal in size and  configuration. There is no evidence of intracranial hemorrhage or  mass-effect. There is low attenuation in the periventricular white  matter, consistent with chronic ischemic change. There are no abnormal  extra-axial fluid collections. There is no evidence of tonsillar  herniation.      The included orbits and their contents are unremarkable. The visualized  paranasal sinuses, mastoid air cells and middle ear cavities are clear.  The visualized osseous structures and overlying soft tissues of the  skull and face are intact.        Impression:       Mild cerebral and cerebellar atrophy with chronic microvascular disease  but no evidence of acute intracranial process.        This report was finalized on 11/01/2017 22:02 by Dr. Fredy Prince MD.    CT Cervical Spine Without Contrast [[id number]] Collected:  11/01/17 2205     Updated:  11/01/17 2209    Narrative:       CT CERVICAL SPINE WO CONTRAST- 11/1/2017 9:42 PM CDT     HISTORY: Bilateral upper and lower extremity numbness  following a fall.     COMPARISON: None      DOSE LENGTH PRODUCT: 262 mGy cm. Automated exposure control was also  utilized to decrease patient radiation dose.     TECHNIQUE: Serial helical tomographic images of the cervical spine were  obtained without the use of intravenous contrast. Additionally, sagittal  and coronal reformatted images were also provided for review.      FINDINGS:   Multilevel degenerative changes are seen in the cervical spine. There is  no evidence of acute fracture or subluxation. The prevertebral soft  tissues are within normal limits. The posterior elements are intact.  Vertebral body heights are maintained.     The visualized skull base is intact. The visualized thorax demonstrates  no acute abnormality.       Impression:       1. Degenerative changes without evidence of acute osseous injury in the  cervical spine.     This report was finalized on 11/01/2017 22:06 by Dr. Ferdy Prince MD.    XR Chest 1 View [[id number]] Updated:  11/01/17 2334        I have personally reviewed and interpreted the radiology studies and ECG obtained at time of admission.     Assessment / Plan     Assessment:   Hospital Problem List     Numbness and tingling      1.  Numbness tingling possible problems with cervical disc disease versus neurodegenerative disorder.  Patient obviously needs a neurologic evaluation by a neurologist.  Will check MRI of brain as well as MRI of neck and a for further workup of this complicated patient to neurology.               Code Status: Full     I discussed the patients findings and my recommendations with the patient who designates his sister is his healthcare power of     Estimated length of stay 1-2 days.    Nate Sepulveda MD   11/02/17   1:04 AM

## 2017-11-02 NOTE — ED PROVIDER NOTES
Subjective   HPI Comments: Patient is a 77-year-old  male presents ER today with complaint of neck pain.  Patient reports approximately one hour prior to arrival he was sitting in his chair and he was turning his neck side to side.  He states when he turned his neck a certain way he felt sharp pain to the right side of his neck.  He states that he then began to have a tingling sensation to his bilateral upper extremities and his right lower extremity.  Patient denies any loss of bowel or bladder control, he denies any saddle anesthesias.  Patient reports me that this also occurred yesterday.  He states it resolved after some time.  He states it occurred again tonight he decided come to the ER for further evaluation.  Patient reports that he feels also feels numb.  He denies any weakness.  Patient denies any problems with his speech he denies any difficulty with his recall. The pt denies any neck pain at this time. The patient has been seen at this ER the past for similar symptoms.  The pt denies any falls or known injuries. He presents ER today for further evaluation.    Patient is a 77 y.o. male presenting with neck pain.   History provided by:  Patient   used: No    Neck Pain   Pain location:  R side  Quality:  Shooting  Pain radiates to:  Does not radiate  Pain severity:  Mild  Pain is:  Same all the time  Onset quality:  Sudden  Duration:  1 hour  Timing:  Constant  Progression:  Unchanged  Chronicity:  New  Context: not fall, not jumping from heights, not lifting a heavy object, not MCA, not MVC, not pedestrian accident and not recent injury    Relieved by:  Nothing  Worsened by:  Nothing  Ineffective treatments:  None tried  Associated symptoms: tingling    Associated symptoms: no bladder incontinence, no bowel incontinence, no chest pain, no fever, no headaches, no leg pain, no numbness, no paresis, no photophobia, no syncope, no visual change and no weakness    Risk factors: no  hx of head and neck radiation, no hx of osteoporosis, no hx of spinal trauma, no recent epidural, no recent head injury and no recurrent falls        Review of Systems   Constitutional: Negative for fever.   Eyes: Negative for photophobia.   Cardiovascular: Negative for chest pain and syncope.   Gastrointestinal: Negative for bowel incontinence.   Genitourinary: Negative for bladder incontinence.   Musculoskeletal: Positive for neck pain.   Neurological: Positive for tingling. Negative for weakness, numbness and headaches.   All other systems reviewed and are negative.      Past Medical History:   Diagnosis Date   • Atypical chest pain    • Hypertension    • Sciatica        No Known Allergies    Past Surgical History:   Procedure Laterality Date   • CALCANEOUS OPEN REDUCTION INTERNAL FIXATION Bilateral    • CHOLECYSTECTOMY     • HERNIA REPAIR         Family History   Problem Relation Age of Onset   • Cancer Mother    • No Known Problems Father    • No Known Problems Sister    • No Known Problems Brother    • No Known Problems Sister    • No Known Problems Brother        Social History     Social History   • Marital status: Single     Spouse name: N/A   • Number of children: N/A   • Years of education: N/A     Social History Main Topics   • Smoking status: Former Smoker     Packs/day: 1.00     Years: 30.00   • Smokeless tobacco: None   • Alcohol use No   • Drug use: No   • Sexual activity: Defer     Other Topics Concern   • None     Social History Narrative           Objective   Physical Exam   Constitutional: He is oriented to person, place, and time. He appears well-developed and well-nourished.   HENT:   Head: Normocephalic and atraumatic.       Eyes: Conjunctivae are normal. Pupils are equal, round, and reactive to light.   Neck: Normal range of motion. Neck supple.   Cardiovascular: Normal rate, regular rhythm and normal heart sounds.    Pulmonary/Chest: Effort normal and breath sounds normal.   Abdominal: Soft.  Bowel sounds are normal.   Musculoskeletal: Normal range of motion.   Neurological: He is alert and oriented to person, place, and time. He has normal strength. No cranial nerve deficit. GCS eye subscore is 4. GCS verbal subscore is 5. GCS motor subscore is 6.   5/5 strength jo UE and LE  No facial drooping noted, sensory intact   Skin: Skin is warm and dry.   Psychiatric: He has a normal mood and affect.   Nursing note and vitals reviewed.      Procedures         ED Course  ED Course   Value Comment By Time    EKG at 2139 shows a normal sinus rhythm at a rate of 65/m no acute ST changes no PVCs no ectopic beats. Penelope CUADRA MD 11/01 2141   Anion Gap: 11.0 A supervisory.  Platelet count is with him.  He was previously low on his previous labs.  Other labs are unremarkable the head and neck showed no acute findings.  Chest x-ray shows no acute findings.The patient is known injury.  The patient is results were discussed with patient.  Reports that his numbness seems to be worse when he is continuing pain.  He states that he is not comfortable going home.  Again the patient reports to me that his symptoms began 1 day ago actually have waxed and waned since that time.  He therefore was a code stroke patient.  At this time patient case was discussed with Dr. Sepulveda, the hospitalist on-call.  He is agreed to admit the patient.  The patient will be admitted at this time stable condition. Airam Smith, APRN 11/01 2328    Pt Xray was reviewed with Dr. Holder, no acute findings noted. Airam Smith, APRN 11/01 2332        CT Cervical Spine Without Contrast   Final Result   1. Degenerative changes without evidence of acute osseous injury in the   cervical spine.       This report was finalized on 11/01/2017 22:06 by Dr. Fredy Prince MD.      CT Head Without Contrast   Final Result   Mild cerebral and cerebellar atrophy with chronic microvascular disease   but no evidence of acute intracranial process.            This report was finalized on 11/01/2017 22:02 by Dr. Fredy Prince MD.      XR Chest 1 View    (Results Pending)     Lab Results (last 24 hours)     Procedure Component Value Units Date/Time    CBC & Differential [[id number]] Collected:  11/01/17 2135    Specimen:  Blood Updated:  11/01/17 2241    Narrative:       The following orders were created for panel order CBC & Differential.  Procedure                               Abnormality         Status                     ---------                               -----------         ------                     Manual Differential[[id number]]          Abnormal            Final result               Scan Slide[[id number]]                                       Final result               CBC Auto Differential[[id number]]        Abnormal            Final result                 Please view results for these tests on the individual orders.    Comprehensive Metabolic Panel [[id number]] Collected:  11/01/17 2135    Specimen:  Blood Updated:  11/01/17 2158     Glucose 91 mg/dL      BUN 21 mg/dL      Creatinine 1.10 mg/dL      Sodium 143 mmol/L      Potassium 4.3 mmol/L      Chloride 105 mmol/L      CO2 27.0 mmol/L      Calcium 9.6 mg/dL      Total Protein 7.5 g/dL      Albumin 4.20 g/dL      ALT (SGPT) 39 U/L      AST (SGOT) 30 U/L      Alkaline Phosphatase 52 U/L      Total Bilirubin 0.4 mg/dL      eGFR Non African Amer 65 mL/min/1.73      Globulin 3.3 gm/dL      A/G Ratio 1.3 g/dL      BUN/Creatinine Ratio 19.1     Anion Gap 11.0 mmol/L     Narrative:       The MDRD GFR formula is only valid for adults with stable renal function between ages 18 and 70.    Protime-INR [[id number]]  (Normal) Collected:  11/01/17 2135    Specimen:  Blood Updated:  11/01/17 2153     Protime 14.0 Seconds      INR 1.05    aPTT [[id number]]  (Normal) Collected:  11/01/17 2135    Specimen:  Blood Updated:  11/01/17 2153     PTT 32.4 seconds     C-reactive Protein [[id number]]  (Normal) Collected:  11/01/17  2135    Specimen:  Blood Updated:  11/01/17 2158     C-Reactive Protein <0.50 mg/dL     Troponin [[id number]]  (Normal) Collected:  11/01/17 2135    Specimen:  Blood Updated:  11/01/17 2206     Troponin I <0.012 ng/mL     CBC Auto Differential [[id number]]  (Abnormal) Collected:  11/01/17 2135    Specimen:  Blood Updated:  11/01/17 2241     WBC 6.71 10*3/mm3      RBC 4.55 (L) 10*6/mm3      Hemoglobin 13.8 (L) g/dL      Hematocrit 39.9 (L) %      MCV 87.7 fL      MCH 30.3 pg      MCHC 34.6 g/dL      RDW 14.4 %      RDW-SD 46.4 fl      MPV 10.9 fL      Platelets 110 (L) 10*3/mm3     Scan Slide [[id number]] Collected:  11/01/17 2135    Specimen:  Blood Updated:  11/01/17 2241     Scan Slide --      See Manual Differential Results       Manual Differential [[id number]]  (Abnormal) Collected:  11/01/17 2135    Specimen:  Blood Updated:  11/01/17 2241     Neutrophil % 35.0 (L) %      Lymphocyte % 23.0 %      Monocyte % 31.0 (H) %      Bands %  4.0 %      Atypical Lymphocyte % 7.0 (H) %      Neutrophils Absolute 2.62 10*3/mm3      Lymphocytes Absolute 1.54 10*3/mm3      Monocytes Absolute 2.08 (H) 10*3/mm3      nRBC 1.0 (H) /100 WBC      RBC Morphology Normal     WBC Morphology Normal     Platelet Morphology Normal                  MDM  Number of Diagnoses or Management Options  Neck pain: new and requires workup  Numbness and tingling: new and requires workup  Thrombocytopenia: new and requires workup     Amount and/or Complexity of Data Reviewed  Clinical lab tests: ordered and reviewed  Tests in the radiology section of CPT®: ordered and reviewed  Tests in the medicine section of CPT®: ordered and reviewed  Decide to obtain previous medical records or to obtain history from someone other than the patient: yes  Discuss the patient with other providers: yes    Patient Progress  Patient progress: stable      Final diagnoses:   Numbness and tingling   Neck pain   Thrombocytopenia            Airam Smith, APRN  11/01/17  1766

## 2017-11-02 NOTE — CONSULTS
Consult visit for discussion of Advance Care Planning/ Living Will.  Pt states he has one at home, but will never bring to hospital.  Conversation continued as to reason for his decision.  States no one needs to know except his appointed agent.

## 2017-11-02 NOTE — PLAN OF CARE
Problem: Patient Care Overview (Adult)  Goal: Plan of Care Review  Outcome: Ongoing (interventions implemented as appropriate)    11/02/17 0314   Coping/Psychosocial Response Interventions   Plan Of Care Reviewed With patient   Patient Care Overview   Progress no change   Outcome Evaluation   Outcome Summary/Follow up Plan patient admit from ER after feeling a pop in his neck and experiencing right leg numbness. A&O. Garbled speech- patient states this is baseline since he doesn't have any teeth. Passed swallow screen in ER. CT head negative. Patient states that numbness is improving.          Problem: Pain, Acute (Adult)  Goal: Identify Related Risk Factors and Signs and Symptoms  Outcome: Outcome(s) achieved Date Met:  11/02/17  Goal: Acceptable Pain Control/Comfort Level  Outcome: Ongoing (interventions implemented as appropriate)

## 2017-11-02 NOTE — PLAN OF CARE
Problem: Patient Care Overview (Adult)  Goal: Plan of Care Review  Outcome: Ongoing (interventions implemented as appropriate)    11/02/17 4349   Coping/Psychosocial Response Interventions   Plan Of Care Reviewed With patient;sibling;other (see comments)  (Pt and pt's sister, Yvrose.)   Patient Care Overview   Progress (Eval )   Outcome Evaluation   Outcome Summary/Follow up Plan Speech-language/cognitive eval completed. Pt was noted to have moderately slurred speech, which changed to only mildly impaired at times. Pt presented with increased rate of verbalizations, felt to further decrease speech intelligibility. Pt attributed imprecise articulation to absence of dentures in oral cavity, yet stated speech and cognitive fx appeared to be his baseline. Pt's sister was present and also feel pt's fx at baseline. During eval, pt presented with fluent expressive language, with occasional, brief word finding difficulty at the conversational level. No concerns with receptive language fx. Given that pt and sister do not feel pt speech-language and/or cognitive fx are variant from the pt's baseline, ST feels continued services are not warranted at this time. MD to re-consult if change or new concerns. Thanks!

## 2017-11-02 NOTE — DISCHARGE SUMMARY
[facility] Medicine Services  DISCHARGE SUMMARY       Date of Admission: 11/1/2017  Date of Discharge:  11/2/2017  Primary Care Physician: Ruben Alegria MD    Discharge Diagnoses:  Hospital Problem List     Numbness and tingling    Sciatica of right side    Chorea            Presenting Problem/History of Present Illness:  Numbness and tingling [R20.0, R20.2]         Hospital Course  Patient came to ER as code stroke  He complains of neck pain and weakness.  MRI of neck and brain were requested including neurology consult     Patient sometime in June 207 was documented to have seen Dr. Mccord in ER for similar complain of numbness and tingling, popping in his neck,  He has had CT of cevical spine, head ct (in June) and LS and TS spine CT (in Aug)     As lifted from Dr. Mccord June 27 consult in ER   1.  Full body paresthesias  2.  Benign movement disorder      Plan      Overall the patient has a normal neurologic exam.  His history of full body paresthesias after a neck pop would not localize well.  There would be no pathology occurring in the cervical spine that could cause facial paresthesias.  This is more likely consistent with an anxiety disorder and possibly some hyperventilation causing a low CO2 which could in turn cause full body paresthesias.  He has no neurologic deficits on examination.  There is no evidence of a spinal level on examination.  At this point in time no further neurologic workup is suggested.  He is instructed to go home and rest.  If any progression occurs he is to return immediately.  In regards to his movement disorder he refuses further outpatient neurology workup.      Patient just came back from imaging.   He claimed that his neck numbness and pain is improve.  He claimed to have right lower extremity numbness too and occ has pain radiating down.     He follows with Dr. Balta Alegria.      Dr. Mccord saw the patient in consult.  He  recommends Ibuprofen 600 mg TID x 5 days and physical therapy.  He felt patient has sciatica.  He reviewed the imaging studies.  He further comment benign chorea and no further intervention.      Procedures Performed:  None  Consults:   Dr. Mccord    Pertinent Test Results:    Lab Results (last 72 hours)     Procedure Component Value Units Date/Time    Protime-INR [[id number]]  (Normal) Collected:  11/01/17 2135    Specimen:  Blood Updated:  11/01/17 2153     Protime 14.0 Seconds      INR 1.05    aPTT [[id number]]  (Normal) Collected:  11/01/17 2135    Specimen:  Blood Updated:  11/01/17 2153     PTT 32.4 seconds     Comprehensive Metabolic Panel [[id number]] Collected:  11/01/17 2135    Specimen:  Blood Updated:  11/01/17 2158     Glucose 91 mg/dL      BUN 21 mg/dL      Creatinine 1.10 mg/dL      Sodium 143 mmol/L      Potassium 4.3 mmol/L      Chloride 105 mmol/L      CO2 27.0 mmol/L      Calcium 9.6 mg/dL      Total Protein 7.5 g/dL      Albumin 4.20 g/dL      ALT (SGPT) 39 U/L      AST (SGOT) 30 U/L      Alkaline Phosphatase 52 U/L      Total Bilirubin 0.4 mg/dL      eGFR Non African Amer 65 mL/min/1.73      Globulin 3.3 gm/dL      A/G Ratio 1.3 g/dL      BUN/Creatinine Ratio 19.1     Anion Gap 11.0 mmol/L     Narrative:       The MDRD GFR formula is only valid for adults with stable renal function between ages 18 and 70.    C-reactive Protein [[id number]]  (Normal) Collected:  11/01/17 2135    Specimen:  Blood Updated:  11/01/17 2158     C-Reactive Protein <0.50 mg/dL     Troponin [[id number]]  (Normal) Collected:  11/01/17 2135    Specimen:  Blood Updated:  11/01/17 2206     Troponin I <0.012 ng/mL     CBC Auto Differential [[id number]]  (Abnormal) Collected:  11/01/17 2135    Specimen:  Blood Updated:  11/01/17 2241     WBC 6.71 10*3/mm3      RBC 4.55 (L) 10*6/mm3      Hemoglobin 13.8 (L) g/dL      Hematocrit 39.9 (L) %      MCV 87.7 fL      MCH 30.3 pg      MCHC 34.6 g/dL      RDW 14.4 %      RDW-SD 46.4 fl       MPV 10.9 fL      Platelets 110 (L) 10*3/mm3     CBC & Differential [[id number]] Collected:  11/01/17 2135    Specimen:  Blood Updated:  11/01/17 2241    Narrative:       The following orders were created for panel order CBC & Differential.  Procedure                               Abnormality         Status                     ---------                               -----------         ------                     Manual Differential[[id number]]          Abnormal            Final result               Scan Slide[[id number]]                                       Final result               CBC Auto Differential[[id number]]        Abnormal            Final result                 Please view results for these tests on the individual orders.    Scan Slide [[id number]] Collected:  11/01/17 2135    Specimen:  Blood Updated:  11/01/17 2241     Scan Slide --      See Manual Differential Results       Manual Differential [[id number]]  (Abnormal) Collected:  11/01/17 2135    Specimen:  Blood Updated:  11/01/17 2241     Neutrophil % 35.0 (L) %      Lymphocyte % 23.0 %      Monocyte % 31.0 (H) %      Bands %  4.0 %      Atypical Lymphocyte % 7.0 (H) %      Neutrophils Absolute 2.62 10*3/mm3      Lymphocytes Absolute 1.54 10*3/mm3      Monocytes Absolute 2.08 (H) 10*3/mm3      nRBC 1.0 (H) /100 WBC      RBC Morphology Normal     WBC Morphology Normal     Platelet Morphology Normal    Urine Culture - Urine, Urine, Clean Catch [[id number]] Collected:  11/02/17 0048    Specimen:  Urine from Urine, Clean Catch Updated:  11/02/17 0057    Urinalysis With / Culture If Indicated - Urine, Clean Catch [[id number]]  (Abnormal) Collected:  11/02/17 0048    Specimen:  Urine from Urine, Clean Catch Updated:  11/02/17 0127     Color, UA Yellow     Appearance, UA Cloudy (A)     pH, UA 6.5     Specific Gravity, UA 1.021     Glucose, UA Negative     Ketones, UA Negative     Bilirubin, UA Negative     Blood, UA Negative     Protein, UA Negative     Leuk  Esterase, UA Trace (A)     Nitrite, UA Negative     Urobilinogen, UA 1.0 E.U./dL    Urinalysis, Microscopic Only - Urine, Clean Catch [[id number]]  (Abnormal) Collected:  11/02/17 0048    Specimen:  Urine from Urine, Clean Catch Updated:  11/02/17 0127     RBC, UA 3-5 (A) /HPF      WBC, UA 3-5 (A) /HPF      Bacteria, UA None Seen /HPF      Squamous Epithelial Cells, UA 3-6 (A) /HPF      Hyaline Casts, UA None Seen /LPF      Granular Casts, UA 0-2 /LPF      Amorphous Crystals, UA Moderate/2+ /HPF      Methodology Manual Light Microscopy    Reticulocytes [[id number]]  (Normal) Collected:  11/02/17 0159    Specimen:  Blood Updated:  11/02/17 0218     Reticulocyte % 0.81 %      Reticulocyte Absolute 0.0378 10*6/mm3     Reticulocytes [[id number]]  (Normal) Collected:  11/02/17 0159    Specimen:  Blood Updated:  11/02/17 0219     Reticulocyte % 0.81 %      Reticulocyte Absolute 0.0371 10*6/mm3     C-reactive Protein [[id number]]  (Normal) Collected:  11/02/17 0159    Specimen:  Blood Updated:  11/02/17 0230     C-Reactive Protein <0.50 mg/dL     Iron Profile [[id number]]  (Normal) Collected:  11/02/17 0159    Specimen:  Blood Updated:  11/02/17 0236     Iron 64 mcg/dL      TIBC 297 mcg/dL      Iron Saturation 22 %     Lipid Panel [[id number]]  (Abnormal) Collected:  11/02/17 0159    Specimen:  Blood Updated:  11/02/17 0238     Total Cholesterol 202 (H) mg/dL      Triglycerides 137 mg/dL      HDL Cholesterol 37 (L) mg/dL      LDL Cholesterol  155 (H) mg/dL      LDL/HDL Ratio 3.72    Sedimentation Rate [[id number]]  (Normal) Collected:  11/02/17 0159    Specimen:  Blood Updated:  11/02/17 0241     Sed Rate <1 mm/hr     Ferritin [[id number]]  (Normal) Collected:  11/02/17 0159    Specimen:  Blood Updated:  11/02/17 0306     Ferritin 190.00 ng/mL     Vitamin B12 [[id number]]  (Normal) Collected:  11/02/17 0159    Specimen:  Blood Updated:  11/02/17 0319     Vitamin B-12 490 pg/mL     Hemoglobin A1c [[id number]] Collected:  11/02/17  0159    Specimen:  Blood Updated:  11/02/17 0322     Hemoglobin A1C 5.6 %     Narrative:       Less than 6.0           Non-Diabetic Range  6.0-7.0                 ADA Therapeutic Target  Greater than 7.0        Action Suggested    Ferritin [[id number]] Collected:  11/02/17 0159    Specimen:  Blood Updated:  11/02/17 0442     Ferritin -- ng/mL       Disregard previous results; duplicate test ordered    Corrected result. Previous result was 185.00 ng/mL on 11/2/2017 at 0307 CDT       Folate [[id number]] Collected:  11/02/17 0159    Specimen:  Blood Updated:  11/02/17 0442     Folate -- ng/mL       Disregard previous results; duplicated testing ordered  Corrected result. Previous result was 8.93 ng/mL on 11/2/2017 at 0337 CDT       Iron Profile [[id number]] Collected:  11/02/17 0159    Specimen:  Blood Updated:  11/02/17 0444     Iron -- mcg/dL       Disregard previous results; duplicate testing ordered  Corrected result. Previous result was 76 mcg/dL on 11/2/2017 at 0237 CDT        TIBC -- mcg/dL       Disregard previous testing; duplicate testing ordered  Corrected result. Previous result was 299 mcg/dL on 11/2/2017 at 0237 CDT        Iron Saturation -- %       Disregard previous testing; duplicate testing ordered  Corrected result. Previous result was 25 % on 11/2/2017 at 0237 CDT       Vitamin B12 [[id number]] Collected:  11/02/17 0159    Specimen:  Blood Updated:  11/02/17 0445     Vitamin B-12 -- pg/mL       Disregard previous results; duplicate testing ordered   Corrected result. Previous result was 509 pg/mL on 11/2/2017 at 0337 CDT       Folate [[id number]]  (Normal) Collected:  11/02/17 0159    Specimen:  Blood Updated:  11/02/17 0453     Folate 9.06 ng/mL           Imaging Results (last 72 hours)     Procedure Component Value Units Date/Time    CT Head Without Contrast [[id number]] Collected:  11/01/17 2200     Updated:  11/01/17 2205    Narrative:       CT HEAD WO CONTRAST- 11/1/2017 9:42 PM CDT     HISTORY: neck  pain, jo UE and LE numbness     COMPARISON: 6/27/17      DOSE LENGTH PRODUCT: 747 mGy cm. Automated exposure control was also  utilized to decrease patient radiation dose.     TECHNIQUE: Helical tomographic images of the brain were obtained without  the use of contrast. Multiplanar reformatted images were provided for  review.     FINDINGS:   The midline structures are nondisplaced. There is mild cerebral and  cerebellar atrophy, with an associated increase in the prominence of the  ventricles and sulci. The basilar cisterns are normal in size and  configuration. There is no evidence of intracranial hemorrhage or  mass-effect. There is low attenuation in the periventricular white  matter, consistent with chronic ischemic change. There are no abnormal  extra-axial fluid collections. There is no evidence of tonsillar  herniation.      The included orbits and their contents are unremarkable. The visualized  paranasal sinuses, mastoid air cells and middle ear cavities are clear.  The visualized osseous structures and overlying soft tissues of the  skull and face are intact.        Impression:       Mild cerebral and cerebellar atrophy with chronic microvascular disease  but no evidence of acute intracranial process.        This report was finalized on 11/01/2017 22:02 by Dr. Fredy Prince MD.    CT Cervical Spine Without Contrast [[id number]] Collected:  11/01/17 2205     Updated:  11/01/17 2209    Narrative:       CT CERVICAL SPINE WO CONTRAST- 11/1/2017 9:42 PM CDT     HISTORY: Bilateral upper and lower extremity numbness following a fall.     COMPARISON: None      DOSE LENGTH PRODUCT: 262 mGy cm. Automated exposure control was also  utilized to decrease patient radiation dose.     TECHNIQUE: Serial helical tomographic images of the cervical spine were  obtained without the use of intravenous contrast. Additionally, sagittal  and coronal reformatted images were also provided for review.      FINDINGS:   Multilevel  degenerative changes are seen in the cervical spine. There is  no evidence of acute fracture or subluxation. The prevertebral soft  tissues are within normal limits. The posterior elements are intact.  Vertebral body heights are maintained.     The visualized skull base is intact. The visualized thorax demonstrates  no acute abnormality.       Impression:       1. Degenerative changes without evidence of acute osseous injury in the  cervical spine.     This report was finalized on 11/01/2017 22:06 by Dr. Fredy Prince MD.    XR Chest 1 View [[id number]] Collected:  11/02/17 0705     Updated:  11/02/17 0709    Narrative:       EXAMINATION: XR CHEST 1 VW-. 11/2/2017 7:05 AM CDT     CHEST, ONE VIEW:     HISTORY: Neck pain. Upper and lower extremity numbness     COMPARISON: 1/16/2017 and 1/15/2017     A single frontal chest radiograph was obtained.     FINDINGS:     The lungs are clear without infiltrates.     The heart is normal in size, pulmonary circulation appropriate, without  heart failure.     The bony structures are intact. Multiple old right rib fractures noted.     Radiographically, chest is unchanged.                                     Impression:       1. No acute cardiopulmonary process.     This report was finalized on 11/02/2017 07:06 by Dr. Ace Abel MD.    MRI Brain Without Contrast [[id number]] Collected:  11/02/17 0825     Updated:  11/02/17 0833    Narrative:          History:  77-year-old evaluated for stroke. Right-sided leg weakness.      Reference   CT head 1 day previous. MRI brain January 2017.     Technique  Routine unenhanced brain MRI.     Findings  There is no area of restricted diffusion to suggest acute infarction. No  extra-axial fluid collection. No chronic cortical infarction is  observed. There is generalized atrophy with commensurate ventricular  enlargement. No convincing hydrocephalus. Mild microangiopathy of the  periventricular white matter.     The major vascular flow voids  are maintained. Mucosal thickening left  maxillary sinus.          Impression:       Impression:  No acute infarction, hemorrhage, or mass.        This report was finalized on 11/02/2017 08:30 by  Vin Medrano, .    MRI Cervical Spine Without Contrast [[id number]] Collected:  11/02/17 0835     Updated:  11/02/17 0848    Narrative:       EXAMINATION: MRI CERVICAL SPINE WO CONTRAST- 11/2/2017 9:35 AM EDT     HISTORY: Hand and leg numbness, neck pain     COMPARISON: CT cervical spine without contrast 11/01/2017     Technical: Multiplanar, multisequence imaging was performed through the  cervical spine without the use of IV contrast.     FINDINGS:  The images are somewhat degraded by patient motion, which limits  evaluation.     The visualized portions of the posterior fossa and brainstem appear  grossly normal.     Review of the visualized paraspinal soft tissues demonstrates no gross  abnormalities.     Alignment of the cervical spine appears normal. Vertebral body heights  appear well maintained. No infiltrative marrow process is identified.  There appears to be some endplate osteophyte formation throughout the  cervical spine, compatible with degenerative changes.     The visualized spinal cord appears normal in signal.     Disc levels:  C2-C3: Minimal posterior osteophyte complex without thecal sac stenosis  or neuroforaminal narrowing.     C3-C4: Posterior disc osteophyte complex and uncovertebral hypertrophy  result in effacement of the thecal side with mild to moderate thecal sac  stenosis. There does appear to be severe right neuroforaminal narrowing.     C4-C5: Posterior disc osteophyte complex and uncovertebral hypertrophy  result in some effacement of the thecal side without significant thecal  sac stenosis. There is mild to moderate bilateral neuroforaminal  narrowing.     C5-C6: Minimal degenerative disc disease without significant thecal sac  stenosis. Uncovertebral hypertrophy result in moderate to severe  "right  neuroforaminal narrowing with mild left neuroforaminal narrowing.     C6-C7: Minimal uncovertebral hypertrophy without significant thecal sac  stenosis. There is moderate bilateral neural foraminal narrowing.     C7-T1: No significant degenerative disc disease, thecal sac stenosis, or  neuroforaminal narrowing.       Impression:       Degenerative changes of the cervical spine which appear most  pronounced at C3-C4. A right lateral posterior osteophyte at this  location results in mild to moderate thecal sac stenosis as well as  severe right neuroforaminal narrowing. Neuroforaminal narrowing is also  noted at multiple additional levels of the cervical spine.  This report was finalized on 11/02/2017 08:45 by Dr. Sinan Manriquez MD.    US Carotid Bilateral [[id number]] Updated:  11/02/17 0854          Condition on Discharge:   Stable    Physical Exam on Discharge:  /90 (BP Location: Left arm, Patient Position: Lying)  Pulse 70  Temp 98.1 °F (36.7 °C) (Oral)   Resp 16  Ht 71\" (180.3 cm)  Wt 175 lb (79.4 kg)  SpO2 95%  BMI 24.41 kg/m2  Physical Exam    He is \"pill rolling\" his fingers, He mumbles his words but otherwise understandable enough.  He was able to stand from the wheelchair and went to bathroom by himself and shows stable gait.   He was able to get back on his bed w/o any assistance.   normocephalic and atraumatic   Moist oral mucosa  No thyromegaly  chest CTA  s1 s2 rrr  Soft abdomen, NABS, non tender  No c/c/e     Discharge Disposition:  Home or Self Care    Discharge Medications:   Clay Rivas   Home Medication Instructions ARMAAN:[id number]    Printed on:11/02/17 1532   Medication Information                      cyclobenzaprine (FLEXERIL) 10 MG tablet  Take 1 tablet by mouth 3 (Three) Times a Day.             HYDROcodone-acetaminophen (NORCO) 7.5-325 MG per tablet  Take 1 tablet by mouth Every 6 (Six) Hours As Needed for Moderate Pain (4-6).             lisinopril (PRINIVIL,ZESTRIL) " 10 MG tablet  Take 5 mg by mouth Daily.             MethylPREDNISolone (MEDROL, OWEN,) 4 MG tablet  Take as directed on package instructions.             pseudoephedrine-guaifenesin (MUCINEX D)  MG per 12 hr tablet  Take 1 tablet by mouth Every 12 (Twelve) Hours.                 Discharge Diet:   Diet Instructions     Diet: Regular, Cardiac; Thin       Discharge Diet:   Regular  Cardiac      Fluid Consistency:  Thin                     Activity at Discharge:   Activity Instructions     Activity as Tolerated                     Follow-up Appointments:  Dr. Alegria in 1 wk or sooner, Neurologist prn  Test Results Pending at Discharge:    Order Current Status    Urine Culture - Urine, Urine, Clean Catch In process           Cameron Bhatt MD  11/02/17  3:32 PM    Time: > 30 mins  Please note that portions of this note may have been completed with a voice recognition program. Efforts were made to edit the dictations, but occasionally words are mistranscribed.

## 2017-11-03 NOTE — PLAN OF CARE
Problem: Patient Care Overview (Adult)  Goal: Plan of Care Review  Outcome: Outcome(s) achieved Date Met:  11/02/17 11/02/17 1700   Coping/Psychosocial Response Interventions   Plan Of Care Reviewed With patient   Patient Care Overview   Progress improving   Outcome Evaluation   Outcome Summary/Follow up Plan Pt had an MRI and full stroke work up this morning. It all came back negative. Pt c/o low grade pain in his neck and right hip, po pain med given. Pt was convinced he needed to have a hip xray done even with repeated explanation and education on why he didn't. Pt was slightly unsteady in his gait but was able to stand up and ambulate to the bathroom w/o pain. Was discharged to home with his sister. Pt's discharge papers were handed out, explained and signed. Taken down in wheelchair, pt stable at discharge.        Goal: Adult Individualization and Mutuality  Outcome: Outcome(s) achieved Date Met:  11/02/17  Goal: Discharge Needs Assessment  Outcome: Outcome(s) achieved Date Met:  11/02/17    Problem: Pain, Acute (Adult)  Goal: Acceptable Pain Control/Comfort Level  Outcome: Outcome(s) achieved Date Met:  11/02/17

## 2017-11-04 LAB
BACTERIA SPEC AEROBE CULT: ABNORMAL
BACTERIA SPEC AEROBE CULT: ABNORMAL

## 2017-11-07 ENCOUNTER — OFFICE VISIT (OUTPATIENT)
Dept: FAMILY MEDICINE CLINIC | Age: 77
End: 2017-11-07
Payer: MEDICARE

## 2017-11-07 VITALS
BODY MASS INDEX: 24.02 KG/M2 | TEMPERATURE: 97.3 F | OXYGEN SATURATION: 93 % | WEIGHT: 172.2 LBS | HEART RATE: 97 BPM | SYSTOLIC BLOOD PRESSURE: 116 MMHG | DIASTOLIC BLOOD PRESSURE: 82 MMHG | RESPIRATION RATE: 16 BRPM

## 2017-11-07 DIAGNOSIS — M51.36 LUMBAR DEGENERATIVE DISC DISEASE: ICD-10-CM

## 2017-11-07 DIAGNOSIS — M54.16 LUMBAR RADICULOPATHY: Primary | ICD-10-CM

## 2017-11-07 DIAGNOSIS — M50.90 CERVICAL DISC DISEASE: ICD-10-CM

## 2017-11-07 PROCEDURE — 1123F ACP DISCUSS/DSCN MKR DOCD: CPT | Performed by: NURSE PRACTITIONER

## 2017-11-07 PROCEDURE — G8427 DOCREV CUR MEDS BY ELIG CLIN: HCPCS | Performed by: NURSE PRACTITIONER

## 2017-11-07 PROCEDURE — G8484 FLU IMMUNIZE NO ADMIN: HCPCS | Performed by: NURSE PRACTITIONER

## 2017-11-07 PROCEDURE — 99214 OFFICE O/P EST MOD 30 MIN: CPT | Performed by: NURSE PRACTITIONER

## 2017-11-07 PROCEDURE — 3017F COLORECTAL CA SCREEN DOC REV: CPT | Performed by: NURSE PRACTITIONER

## 2017-11-07 PROCEDURE — G8420 CALC BMI NORM PARAMETERS: HCPCS | Performed by: NURSE PRACTITIONER

## 2017-11-07 PROCEDURE — 4040F PNEUMOC VAC/ADMIN/RCVD: CPT | Performed by: NURSE PRACTITIONER

## 2017-11-07 PROCEDURE — 1036F TOBACCO NON-USER: CPT | Performed by: NURSE PRACTITIONER

## 2017-11-07 RX ORDER — KETOROLAC TROMETHAMINE 10 MG/1
TABLET, FILM COATED ORAL
Refills: 0 | COMMUNITY
Start: 2017-08-12 | End: 2017-11-07 | Stop reason: ALTCHOICE

## 2017-11-07 RX ORDER — CYCLOBENZAPRINE HCL 10 MG
TABLET ORAL
Refills: 0 | COMMUNITY
Start: 2017-08-12 | End: 2017-11-08

## 2017-11-07 RX ORDER — LISINOPRIL 10 MG/1
5 TABLET ORAL
COMMUNITY
End: 2017-11-07 | Stop reason: ALTCHOICE

## 2017-11-07 RX ORDER — IBUPROFEN 200 MG
600 TABLET ORAL
COMMUNITY
Start: 2017-11-02 | End: 2017-11-14

## 2017-11-07 RX ORDER — GUAIFENESIN, PSEUDOEPHEDRINE HYDROCHLORIDE 600; 60 MG/1; MG/1
1 TABLET, EXTENDED RELEASE ORAL
COMMUNITY
End: 2017-11-07 | Stop reason: ALTCHOICE

## 2017-11-07 RX ORDER — METHYLPREDNISOLONE 4 MG/1
TABLET ORAL
COMMUNITY
Start: 2017-01-17 | End: 2017-11-07 | Stop reason: ALTCHOICE

## 2017-11-07 RX ORDER — CYCLOBENZAPRINE HCL 10 MG
10 TABLET ORAL
COMMUNITY
Start: 2017-08-12 | End: 2017-11-07 | Stop reason: ALTCHOICE

## 2017-11-07 RX ORDER — HYDROCODONE BITARTRATE AND ACETAMINOPHEN 7.5; 325 MG/1; MG/1
1 TABLET ORAL
COMMUNITY
Start: 2017-08-12 | End: 2017-11-07 | Stop reason: ALTCHOICE

## 2017-11-07 ASSESSMENT — PATIENT HEALTH QUESTIONNAIRE - PHQ9
1. LITTLE INTEREST OR PLEASURE IN DOING THINGS: 0
SUM OF ALL RESPONSES TO PHQ9 QUESTIONS 1 & 2: 0
SUM OF ALL RESPONSES TO PHQ QUESTIONS 1-9: 0
2. FEELING DOWN, DEPRESSED OR HOPELESS: 0

## 2017-11-07 ASSESSMENT — ENCOUNTER SYMPTOMS
SHORTNESS OF BREATH: 0
BACK PAIN: 1
NAUSEA: 0
CHEST TIGHTNESS: 0

## 2017-11-07 NOTE — PROGRESS NOTES
Yomaira Massey is a 78 y.o. male who presents today for   Chief Complaint   Patient presents with    Follow-Up from [facility] d/c 11/2/17 numbness and tingling     Numbness     lower back still, pt does not believe this is due to sciatic pain        HPI:  Here for hospital f/u. He was admitted to [facility] 11/1 with R leg numbness which started the day of admission but he has had it intermittently in the recent past.  In the hospital, he developed R lower back pain with this which was not going on when he was admitted. No leg weakness per his report. CT of lumbar spine in August showed degenerative changes at L4-5 with mild spinal stenosis. Neuroforaminal narrowing noted throughout the lumbar spine. He was seen in the ER at that time with back pain. With this visit, he initially had stroke workup which was negative. He has also had intermittent lower neck pain with R arm numbness. MRI of cervical spine this admission showed severe R neuroforaminal narrowing at C3-4 with a posterior disc osteophyte complex. Otherwise diffuse degenerative changes. No significant neck pain or arm numbness at this time. His lower back pain comes and goes but is ok at this time. He still has R leg numbness but improving. He takes aleve or ibuprofen prn with relief. Review of Systems   Constitutional: Negative for chills and fever. Respiratory: Negative for chest tightness and shortness of breath. Cardiovascular: Negative for chest pain. Gastrointestinal: Negative for nausea. Musculoskeletal: Positive for back pain and neck pain. Negative for arthralgias and myalgias. Skin: Negative for rash. Neurological: Positive for numbness (R leg, R arm; see HPI). Negative for weakness. No past medical history on file.     Current Outpatient Prescriptions   Medication Sig Dispense Refill    cyclobenzaprine (FLEXERIL) 10 MG tablet TAKE 1 TABLET THREE TIMES A DAY AS NEEDED  0    ibuprofen (ADVIL;MOTRIN) 200 MG tablet Take 600 mg by mouth       No current facility-administered medications for this visit. No Known Allergies    Past Surgical History:   Procedure Laterality Date    FOOT SURGERY         Social History   Substance Use Topics    Smoking status: Former Smoker     Types: Cigarettes    Smokeless tobacco: Never Used      Comment: quit smoking in 85     Alcohol use No       Family History   Problem Relation Age of Onset    Heart Disease Mother     Heart Disease Father        /82   Pulse 97   Temp 97.3 °F (36.3 °C) (Temporal)   Resp 16   Wt 172 lb 3.2 oz (78.1 kg)   SpO2 93%   BMI 24.02 kg/m²     Physical Exam   Constitutional: He appears well-developed and well-nourished. HENT:   Head: Normocephalic. Eyes: Conjunctivae and EOM are normal. Pupils are equal, round, and reactive to light. Neck: No JVD present. Cardiovascular: Normal rate, regular rhythm, normal heart sounds and intact distal pulses. No murmur heard. Pulmonary/Chest: Effort normal and breath sounds normal. No respiratory distress. Musculoskeletal: Normal range of motion. He exhibits tenderness (Mild tenderness at lower lumbar spine and R paraspinal region). He exhibits no edema. Skin: Skin is warm and dry. No rash noted. Psychiatric: He has a normal mood and affect. Vitals reviewed. Assessment:    ICD-10-CM ICD-9-CM    1. Lumbar radiculopathy M54.16 724.4    2. Lumbar degenerative disc disease M51.36 722.52    3. Cervical disc disease M50.90 722.91        Plan:  -MRI lumbar spine due to lumbar radiculopathy and findings on CT.  -He may continue otc NSAID prn for pain.  -Call with any acute worsening/problems. -F/U as scheduled. [facility]     Diagnoses and all orders for this visit:    Lumbar radiculopathy    Lumbar degenerative disc disease    Cervical disc disease      Medications Discontinued During This Encounter   Medication Reason   

## 2017-11-08 ENCOUNTER — APPOINTMENT (OUTPATIENT)
Dept: PHYSICAL THERAPY | Facility: HOSPITAL | Age: 77
End: 2017-11-08
Attending: INTERNAL MEDICINE

## 2017-11-08 ENCOUNTER — APPOINTMENT (OUTPATIENT)
Dept: CT IMAGING | Age: 77
End: 2017-11-08
Payer: MEDICARE

## 2017-11-08 ENCOUNTER — HOSPITAL ENCOUNTER (EMERGENCY)
Age: 77
Discharge: HOME | End: 2017-11-08
Attending: FAMILY MEDICINE
Payer: MEDICARE

## 2017-11-08 VITALS
WEIGHT: 175 LBS | OXYGEN SATURATION: 93 % | BODY MASS INDEX: 24.5 KG/M2 | DIASTOLIC BLOOD PRESSURE: 78 MMHG | SYSTOLIC BLOOD PRESSURE: 135 MMHG | HEART RATE: 62 BPM | HEIGHT: 71 IN | TEMPERATURE: 97.6 F | RESPIRATION RATE: 20 BRPM

## 2017-11-08 DIAGNOSIS — G89.29 ACUTE EXACERBATION OF CHRONIC LOW BACK PAIN: ICD-10-CM

## 2017-11-08 DIAGNOSIS — M54.40 ACUTE RIGHT-SIDED LOW BACK PAIN WITH SCIATICA, SCIATICA LATERALITY UNSPECIFIED: Primary | ICD-10-CM

## 2017-11-08 DIAGNOSIS — M54.50 ACUTE EXACERBATION OF CHRONIC LOW BACK PAIN: ICD-10-CM

## 2017-11-08 DIAGNOSIS — M54.30 SCIATICA, UNSPECIFIED LATERALITY: ICD-10-CM

## 2017-11-08 PROCEDURE — 99283 EMERGENCY DEPT VISIT LOW MDM: CPT | Performed by: FAMILY MEDICINE

## 2017-11-08 PROCEDURE — 72131 CT LUMBAR SPINE W/O DYE: CPT

## 2017-11-08 PROCEDURE — 6370000000 HC RX 637 (ALT 250 FOR IP): Performed by: FAMILY MEDICINE

## 2017-11-08 PROCEDURE — 99283 EMERGENCY DEPT VISIT LOW MDM: CPT

## 2017-11-08 RX ORDER — ACETAMINOPHEN AND CODEINE PHOSPHATE 300; 30 MG/1; MG/1
1 TABLET ORAL PRN
Qty: 8 TABLET | Refills: 0 | Status: SHIPPED | OUTPATIENT
Start: 2017-11-08 | End: 2017-11-11 | Stop reason: ALTCHOICE

## 2017-11-08 RX ORDER — HYDROCODONE BITARTRATE AND ACETAMINOPHEN 7.5; 325 MG/1; MG/1
1 TABLET ORAL ONCE
Status: COMPLETED | OUTPATIENT
Start: 2017-11-08 | End: 2017-11-08

## 2017-11-08 RX ORDER — IBUPROFEN 600 MG/1
600 TABLET ORAL EVERY 8 HOURS PRN
COMMUNITY
End: 2017-11-14

## 2017-11-08 RX ADMIN — HYDROCODONE BITARTRATE AND ACETAMINOPHEN 1 TABLET: 7.5; 325 TABLET ORAL at 21:45

## 2017-11-08 ASSESSMENT — ENCOUNTER SYMPTOMS
BACK PAIN: 1
WHEEZING: 0
COUGH: 0
SHORTNESS OF BREATH: 0
VOMITING: 0
ABDOMINAL DISTENTION: 0
SORE THROAT: 0
ABDOMINAL PAIN: 0
APNEA: 0
CHEST TIGHTNESS: 0
CONSTIPATION: 0
DIARRHEA: 0
TROUBLE SWALLOWING: 0

## 2017-11-08 ASSESSMENT — PAIN SCALES - GENERAL
PAINLEVEL_OUTOF10: 3
PAINLEVEL_OUTOF10: 10
PAINLEVEL_OUTOF10: 10

## 2017-11-08 ASSESSMENT — PAIN DESCRIPTION - LOCATION: LOCATION: BACK

## 2017-11-08 ASSESSMENT — PAIN DESCRIPTION - DESCRIPTORS: DESCRIPTORS: SHARP

## 2017-11-09 ENCOUNTER — TELEPHONE (OUTPATIENT)
Dept: FAMILY MEDICINE CLINIC | Age: 77
End: 2017-11-09

## 2017-11-09 DIAGNOSIS — M51.36 LUMBAR DEGENERATIVE DISC DISEASE: Primary | ICD-10-CM

## 2017-11-09 RX ORDER — HYDROCODONE BITARTRATE AND ACETAMINOPHEN 5; 325 MG/1; MG/1
1 TABLET ORAL EVERY 6 HOURS PRN
Qty: 20 TABLET | Refills: 0 | Status: SHIPPED | OUTPATIENT
Start: 2017-11-09 | End: 2017-11-16

## 2017-11-09 NOTE — TELEPHONE ENCOUNTER
I can call him in hydrocodone 5 mg to take as needed. He has an appointment with me scheduled for next week that he needs to keep if he has pain to this degree.  I do not have a pharmacy on the chart to fax his prescription to

## 2017-11-09 NOTE — TELEPHONE ENCOUNTER
Pt states he went to the ER yesterday for back pain, he states that he was given 8 tablets of tylenol 3 at his doctor visit, but the pain is so severe that he could not endure so he went to the ER. He is requesting something additional or something stronger for the pain.  Pt states he has a very hard time getting to the office because his sister is his only ride

## 2017-11-09 NOTE — ED PROVIDER NOTES
[facility] EMERGENCY DEPT  eMERGENCY dEPARTMENT eNCOUnter      Pt Name: Carola Motnez  MRN: [id number]  [place] 1940  Date of evaluation: 11/8/2017  Provider: Michael Alexandre MD    [address] [place] [address]       Chief Complaint   Patient presents with    Back Pain     Pt arrived to ed with c/o back pain. pt states he has right leg numbness. pt states this has been ongoing for months and was just diagnosed from [facility] two days ago with a diagnosis of sciatica per pt report. HISTORY OF PRESENT ILLNESS   (Location/Symptom, Timing/Onset, Context/Setting, Quality, Duration, Modifying Factors, Severity)  Note limiting factors. Carola Montez is a 78 y.o. male who presents to the emergency department. Patient presents with intermittent sharp lower back pain ×1 month. He states he was recently hospitalized at [facility] for same. He states that he was told he has sciatica. He has not followed up with anybody. Been no trauma. HPI    Nursing Notes were reviewed. REVIEW OF SYSTEMS    (2-9 systems for level 4, 10 or more for level 5)     Review of Systems   Constitutional: Negative for diaphoresis and fever. HENT: Negative for sore throat and trouble swallowing. Eyes: Negative for visual disturbance. Respiratory: Negative for apnea, cough, chest tightness, shortness of breath and wheezing. Cardiovascular: Negative for chest pain, palpitations and leg swelling. Gastrointestinal: Negative for abdominal distention, abdominal pain, constipation, diarrhea and vomiting. Musculoskeletal: Positive for back pain. Negative for myalgias. Skin: Negative for pallor. Neurological: Negative for dizziness, weakness, light-headedness and headaches. Psychiatric/Behavioral: Negative for behavioral problems and suicidal ideas.             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 FAMILY HISTORY       Family History   Problem Relation Age of Onset    Heart Disease Mother     Heart Disease Father           SOCIAL HISTORY       Social History     Social History    Marital status:      Spouse name: N/A    Number of children: N/A    Years of education: N/A     Social History Main Topics    Smoking status: Former Smoker     Types: Cigarettes    Smokeless tobacco: Never Used      Comment: quit smoking in 85     Alcohol use No    Drug use: No    Sexual activity: Not Asked     Other Topics Concern    None     Social History Narrative    None       SCREENINGS    Anayeli Coma Scale  Eye Opening: Spontaneous  Best Verbal Response: Oriented  Best Motor Response: Obeys commands  [place] Coma Scale Score: 15        PHYSICAL EXAM    (up to 7 for level 4, 8 or more for level 5)     ED Triage Vitals [11/08/17 2033]   BP Temp Temp Source Pulse Resp SpO2 Height Weight   (!) 190/95 97.6 °F (36.4 °C) Oral 69 20 95 % 5' 11\" (1.803 m) 175 lb (79.4 kg)       Physical Exam   Constitutional: He is oriented to person, place, and time. He appears well-developed and well-nourished. HENT:   Head: Normocephalic and atraumatic. Eyes: Conjunctivae and EOM are normal. Pupils are equal, round, and reactive to light. Neck: Normal range of motion. Neck supple. No tracheal deviation present. Cardiovascular: Normal rate, regular rhythm, normal heart sounds and intact distal pulses. Pulmonary/Chest: Effort normal and breath sounds normal. No respiratory distress. He has no wheezes.  He has no

## 2017-11-11 ENCOUNTER — HOSPITAL ENCOUNTER (EMERGENCY)
Age: 77
Discharge: HOME | End: 2017-11-11
Payer: MEDICARE

## 2017-11-11 VITALS
SYSTOLIC BLOOD PRESSURE: 156 MMHG | DIASTOLIC BLOOD PRESSURE: 87 MMHG | HEART RATE: 70 BPM | BODY MASS INDEX: 24.5 KG/M2 | HEIGHT: 71 IN | WEIGHT: 175 LBS | OXYGEN SATURATION: 94 % | TEMPERATURE: 98.2 F

## 2017-11-11 DIAGNOSIS — M54.16 LUMBAR BACK PAIN WITH RADICULOPATHY AFFECTING RIGHT LOWER EXTREMITY: Primary | ICD-10-CM

## 2017-11-11 PROCEDURE — 96372 THER/PROPH/DIAG INJ SC/IM: CPT

## 2017-11-11 PROCEDURE — 6360000002 HC RX W HCPCS: Performed by: NURSE PRACTITIONER

## 2017-11-11 PROCEDURE — 99283 EMERGENCY DEPT VISIT LOW MDM: CPT

## 2017-11-11 PROCEDURE — 99283 EMERGENCY DEPT VISIT LOW MDM: CPT | Performed by: NURSE PRACTITIONER

## 2017-11-11 RX ORDER — ORPHENADRINE CITRATE 30 MG/ML
60 INJECTION INTRAMUSCULAR; INTRAVENOUS ONCE
Status: COMPLETED | OUTPATIENT
Start: 2017-11-11 | End: 2017-11-11

## 2017-11-11 RX ORDER — DEXAMETHASONE SODIUM PHOSPHATE 10 MG/ML
4 INJECTION, SOLUTION INTRAMUSCULAR; INTRAVENOUS ONCE
Status: COMPLETED | OUTPATIENT
Start: 2017-11-11 | End: 2017-11-11

## 2017-11-11 RX ORDER — METHYLPREDNISOLONE 4 MG/1
TABLET ORAL
Qty: 1 KIT | Refills: 0 | Status: SHIPPED | OUTPATIENT
Start: 2017-11-11 | End: 2017-11-14

## 2017-11-11 RX ORDER — MORPHINE SULFATE 10 MG/ML
4 INJECTION, SOLUTION INTRAMUSCULAR; INTRAVENOUS ONCE
Status: DISCONTINUED | OUTPATIENT
Start: 2017-11-11 | End: 2017-11-11

## 2017-11-11 RX ORDER — KETOROLAC TROMETHAMINE 30 MG/ML
30 INJECTION, SOLUTION INTRAMUSCULAR; INTRAVENOUS ONCE
Status: COMPLETED | OUTPATIENT
Start: 2017-11-11 | End: 2017-11-11

## 2017-11-11 RX ORDER — MORPHINE SULFATE 10 MG/ML
4 INJECTION, SOLUTION INTRAMUSCULAR; INTRAVENOUS ONCE
Status: COMPLETED | OUTPATIENT
Start: 2017-11-11 | End: 2017-11-11

## 2017-11-11 RX ADMIN — ORPHENADRINE CITRATE 60 MG: 30 INJECTION INTRAMUSCULAR; INTRAVENOUS at 09:45

## 2017-11-11 RX ADMIN — MORPHINE SULFATE 4 MG: 10 INJECTION, SOLUTION INTRAMUSCULAR; INTRAVENOUS at 09:46

## 2017-11-11 RX ADMIN — KETOROLAC TROMETHAMINE 30 MG: 30 INJECTION, SOLUTION INTRAMUSCULAR at 09:45

## 2017-11-11 RX ADMIN — DEXAMETHASONE SODIUM PHOSPHATE 10 MG: 10 INJECTION, SOLUTION INTRAMUSCULAR; INTRAVENOUS at 09:45

## 2017-11-11 ASSESSMENT — ENCOUNTER SYMPTOMS: BACK PAIN: 1

## 2017-11-11 ASSESSMENT — PAIN SCALES - GENERAL: PAINLEVEL_OUTOF10: 10

## 2017-11-11 NOTE — ED NOTES
Bed: 03  Expected date:   Expected time:   Means of arrival:   Comments:  Matt Stone RN  11/11/17 2697

## 2017-11-11 NOTE — ED PROVIDER NOTES
[facility] EMERGENCY DEPT  eMERGENCY dEPARTMENT eNCOUnter      Pt Name: Jordon Peralta  MRN: [id number]  [place] 1940  Date of evaluation: 11/11/2017  Provider: Corky Mueller, [address]       Chief Complaint   Patient presents with    Back Pain     back pain and legs going numb. HISTORY OF PRESENT ILLNESS   (Location/Symptom, Timing/Onset, Context/Setting, Quality, Duration, Modifying Factors, Severity)  Note limiting factors. Jordon Peralta is a 78 y.o. male who presents to the emergency department for evaluation of back pain and leg numbness. Pt tells me that he has had right leg numbness over past 11 days. He tells me that he was evaluated for back pain here and by his pcp last week. He has had recent CT of lumbar spine and has had worsening low back pain over past few days. He continues with norco as previously prescribed. He denies injury to his back. He has had no fever. He tells me that he has had increased numbness to bilateral lower extremities. He has had no saddle anesthesia or changes in bowel bladder. He tells me that he has had constipation with last bowel movement yesterday. He has no history of immunosuppression. He tells me that he has an appointment for an MRI of his lumbar spine next week. HPI    Nursing Notes were reviewed. REVIEW OF SYSTEMS    (2-9 systems for level 4, 10 or more for level 5)     Review of Systems   Constitutional: Negative. Cardiovascular: Negative. Gastrointestinal: Abdominal pain: yesterday. Musculoskeletal: Positive for back pain. Skin: Negative. Neurological: Positive for numbness (legs right greater than left). A complete review of systems was performed and is negative except as noted above in the HPI.        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    Sciatica          SURGICAL HISTORY       Past Surgical History:   Procedure Laterality Date    EYE SURGERY      plates and screw around left eye    FOOT SURGERY           CURRENT MEDICATIONS       Discharge Medication List as of 11/11/2017  8:52 AM      CONTINUE these medications which have NOT CHANGED    Details   HYDROcodone-acetaminophen (NORCO) 5-325 MG per tablet Take 1 tablet by mouth every 6 hours as needed for Pain ., Disp-20 tablet, R-0Print      ibuprofen (ADVIL;MOTRIN) 600 MG tablet Take 600 mg by mouth every 8 hours as needed for PainHistorical Med             ALLERGIES     Review of patient's allergies indicates no known allergies. FAMILY HISTORY       Family History   Problem Relation Age of Onset    Heart Disease Mother     Heart Disease Father           SOCIAL HISTORY       Social History     Social History    Marital status:      Spouse name: N/A    Number of children: N/A    Years of education: N/A     Social History Main Topics    Smoking status: Former Smoker     Types: Cigarettes    Smokeless tobacco: Never Used      Comment: quit smoking in 85     Alcohol use No    Drug use: No    Sexual activity: Not on file     Other Topics Concern    Not on file     Social History Narrative    No narrative on file       SCREENINGS             PHYSICAL EXAM    (up to 7 for level 4, 8 or more for level 5)     ED Triage Vitals [11/11/17 0813]   BP Temp Temp Source Pulse Resp SpO2 Height Weight   (!) 156/87 98.2 °F (36.8 °C) Oral 70 -- 94 % 5' 11\" (1.803 m) 175 lb (79.4 kg)       Physical Exam   Constitutional: He is oriented to person, place, and time. He appears well-developed. HENT:   Head: Normocephalic. Mouth/Throat: Oropharynx is clear and moist.   Neck: Normal range of motion. Cardiovascular: Normal rate and regular rhythm. Pulmonary/Chest: Effort normal and breath sounds normal.   Abdominal: Soft. There is no tenderness. There is no rebound and no guarding. Musculoskeletal: Normal range of motion.    5/5 MS bilateral lower extremities hip/knees/ankles  Pt is able to

## 2017-11-14 ENCOUNTER — HOSPITAL ENCOUNTER (EMERGENCY)
Age: 77
Discharge: HOME | End: 2017-11-14
Payer: MEDICARE

## 2017-11-14 ENCOUNTER — APPOINTMENT (OUTPATIENT)
Dept: GENERAL RADIOLOGY | Age: 77
End: 2017-11-14
Payer: MEDICARE

## 2017-11-14 VITALS
OXYGEN SATURATION: 94 % | SYSTOLIC BLOOD PRESSURE: 142 MMHG | HEIGHT: 71 IN | HEART RATE: 68 BPM | BODY MASS INDEX: 24.5 KG/M2 | DIASTOLIC BLOOD PRESSURE: 81 MMHG | TEMPERATURE: 98.1 F | WEIGHT: 175 LBS | RESPIRATION RATE: 18 BRPM

## 2017-11-14 DIAGNOSIS — M54.6 THORACIC BACK PAIN, UNSPECIFIED BACK PAIN LATERALITY, UNSPECIFIED CHRONICITY: ICD-10-CM

## 2017-11-14 DIAGNOSIS — R07.89 ATYPICAL CHEST PAIN: Primary | ICD-10-CM

## 2017-11-14 LAB
ALBUMIN SERPL-MCNC: 4.6 G/DL (ref 3.5–5.2)
ALP BLD-CCNC: 53 U/L (ref 40–130)
ALT SERPL-CCNC: 16 U/L (ref 5–41)
ANION GAP SERPL CALCULATED.3IONS-SCNC: 12 MMOL/L (ref 7–19)
AST SERPL-CCNC: 16 U/L (ref 5–40)
ATYPICAL LYMPHOCYTE RELATIVE PERCENT: 1 % (ref 0–8)
BANDED NEUTROPHILS RELATIVE PERCENT: 1 % (ref 0–5)
BASOPHILS ABSOLUTE: 0 K/UL (ref 0–0.2)
BASOPHILS MANUAL: 0 %
BASOPHILS RELATIVE PERCENT: 0 % (ref 0–1)
BILIRUB SERPL-MCNC: 0.6 MG/DL (ref 0.2–1.2)
BUN BLDV-MCNC: 18 MG/DL (ref 8–23)
CALCIUM SERPL-MCNC: 9.9 MG/DL (ref 8.8–10.2)
CHLORIDE BLD-SCNC: 99 MMOL/L (ref 98–111)
CO2: 28 MMOL/L (ref 22–29)
CREAT SERPL-MCNC: 1.1 MG/DL (ref 0.5–1.2)
EOSINOPHILS ABSOLUTE: 0 K/UL (ref 0–0.6)
EOSINOPHILS RELATIVE PERCENT: 0 % (ref 0–5)
GFR NON-AFRICAN AMERICAN: >60
GLUCOSE BLD-MCNC: 105 MG/DL (ref 74–109)
HCT VFR BLD CALC: 43.2 % (ref 42–52)
HEMOGLOBIN: 14.7 G/DL (ref 14–18)
LYMPHOCYTES ABSOLUTE: 1 K/UL (ref 1.1–4.5)
LYMPHOCYTES RELATIVE PERCENT: 9 % (ref 20–40)
MCH RBC QN AUTO: 29.9 PG (ref 27–31)
MCHC RBC AUTO-ENTMCNC: 34 G/DL (ref 33–37)
MCV RBC AUTO: 88 FL (ref 80–94)
MONOCYTES ABSOLUTE: 3.2 K/UL (ref 0–0.9)
MONOCYTES RELATIVE PERCENT: 32 % (ref 0–10)
NEUTROPHILS ABSOLUTE: 5.9 K/UL (ref 1.5–7.5)
NEUTROPHILS MANUAL: 57 %
NEUTROPHILS RELATIVE PERCENT: 57 % (ref 50–65)
OVALOCYTES: ABNORMAL
PDW BLD-RTO: 13.5 % (ref 11.5–14.5)
PERFORMED ON: NORMAL
PLATELET # BLD: 141 K/UL (ref 130–400)
PLATELET SLIDE REVIEW: ADEQUATE
PMV BLD AUTO: 10.3 FL (ref 9.4–12.4)
POC TROPONIN I: 0.01 NG/ML (ref 0–0.08)
POTASSIUM SERPL-SCNC: 4.2 MMOL/L (ref 3.5–5)
RBC # BLD: 4.91 M/UL (ref 4.7–6.1)
SODIUM BLD-SCNC: 139 MMOL/L (ref 136–145)
TOTAL PROTEIN: 7.9 G/DL (ref 6.6–8.7)
WBC # BLD: 10.1 K/UL (ref 4.8–10.8)

## 2017-11-14 PROCEDURE — 93005 ELECTROCARDIOGRAM TRACING: CPT

## 2017-11-14 PROCEDURE — 6360000002 HC RX W HCPCS: Performed by: PHYSICIAN ASSISTANT

## 2017-11-14 PROCEDURE — 80053 COMPREHEN METABOLIC PANEL: CPT

## 2017-11-14 PROCEDURE — 85025 COMPLETE CBC W/AUTO DIFF WBC: CPT

## 2017-11-14 PROCEDURE — 36415 COLL VENOUS BLD VENIPUNCTURE: CPT

## 2017-11-14 PROCEDURE — 99285 EMERGENCY DEPT VISIT HI MDM: CPT

## 2017-11-14 PROCEDURE — 96372 THER/PROPH/DIAG INJ SC/IM: CPT

## 2017-11-14 PROCEDURE — 99283 EMERGENCY DEPT VISIT LOW MDM: CPT | Performed by: PHYSICIAN ASSISTANT

## 2017-11-14 PROCEDURE — 71010 XR CHEST PORTABLE: CPT

## 2017-11-14 PROCEDURE — 84484 ASSAY OF TROPONIN QUANT: CPT

## 2017-11-14 PROCEDURE — 72072 X-RAY EXAM THORAC SPINE 3VWS: CPT

## 2017-11-14 RX ORDER — HYDROCODONE BITARTRATE AND ACETAMINOPHEN 5; 325 MG/1; MG/1
1 TABLET ORAL EVERY 6 HOURS PRN
Qty: 20 TABLET | Refills: 0 | Status: SHIPPED | OUTPATIENT
Start: 2017-11-14 | End: 2017-11-20 | Stop reason: SDUPTHER

## 2017-11-14 RX ORDER — ASPIRIN 325 MG
325 TABLET ORAL ONCE
Status: DISCONTINUED | OUTPATIENT
Start: 2017-11-14 | End: 2017-11-14 | Stop reason: HOSPADM

## 2017-11-14 RX ORDER — MORPHINE SULFATE 10 MG/ML
4 INJECTION, SOLUTION INTRAMUSCULAR; INTRAVENOUS ONCE
Status: COMPLETED | OUTPATIENT
Start: 2017-11-14 | End: 2017-11-14

## 2017-11-14 RX ORDER — NITROGLYCERIN 0.4 MG/1
0.4 TABLET SUBLINGUAL ONCE
Status: DISCONTINUED | OUTPATIENT
Start: 2017-11-14 | End: 2017-11-14 | Stop reason: HOSPADM

## 2017-11-14 RX ORDER — METHYLPREDNISOLONE SODIUM SUCCINATE 125 MG/2ML
125 INJECTION, POWDER, LYOPHILIZED, FOR SOLUTION INTRAMUSCULAR; INTRAVENOUS ONCE
Status: COMPLETED | OUTPATIENT
Start: 2017-11-14 | End: 2017-11-14

## 2017-11-14 RX ADMIN — METHYLPREDNISOLONE SODIUM SUCCINATE 125 MG: 125 INJECTION, POWDER, FOR SOLUTION INTRAMUSCULAR; INTRAVENOUS at 10:22

## 2017-11-14 RX ADMIN — MORPHINE SULFATE 4 MG: 10 INJECTION, SOLUTION INTRAMUSCULAR; INTRAVENOUS at 10:22

## 2017-11-14 ASSESSMENT — ENCOUNTER SYMPTOMS
NAUSEA: 1
BACK PAIN: 1

## 2017-11-14 ASSESSMENT — PAIN DESCRIPTION - LOCATION: LOCATION: CHEST;BACK

## 2017-11-14 ASSESSMENT — PAIN SCALES - GENERAL: PAINLEVEL_OUTOF10: 8

## 2017-11-14 NOTE — ED NOTES
When pt is questioned about his chest pain, he immediately reverts to discuss his back pain, which is chronic\"living with pain is horrible that is why I scheduled to have surgery but it isnt scheduled until the 20th\"      Flakita Nixon RN  11/14/17 8574

## 2017-11-14 NOTE — ED PROVIDER NOTES
eMERGENCY dEPARTMENT eNCOUnter      Pt Name: Kimberly Best  MRN: [id number]  [place] 1940  Date of evaluation: 11/14/2017  Provider: Adelaida Eugene Dr       Chief Complaint   Patient presents with    Chest Pain         HISTORY OF PRESENT ILLNESS  (Location/Symptom, Timing/Onset, Context/Setting, Quality, Duration, Modifying Factors, Severity.)   Kimberly Best is a 78 y.o. male who presents to the emergency department With chest pain that began yesterday afternoon. Patient states his chest pain is coming from his mid thoracic back and radiating around the right side of his chest.  He is nauseated but has not vomited. Denies shortness of breath. He denies any prior history of myocardial infarction. He is not a smoker. Denies any cough congestion or fevers recently. No abdominal pain. He has a history of chronic neck and back pain. Denies saddle anesthesia, no incontinence of urine or stool. No urinary retention. He has an appointment coming up with Dr. Amy Golden, orthopedic surgeon on Nov 20th. Nursing Notes were reviewed and I agree. REVIEW OF SYSTEMS    (2-9 systems for level 4, 10 or more for level 5)     Review of Systems   Cardiovascular: Positive for chest pain. Gastrointestinal: Positive for nausea. Musculoskeletal: Positive for back pain. Except as noted above the remainder of the review of systems was reviewed and negative.        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 HYDROcodone-acetaminophen (NORCO) 5-325 MG per tablet Take 1 tablet by mouth every 6 hours as needed for Pain ., Disp-20 tablet, R-0Print       !! - Potential duplicate medications found. Please discuss with provider. ALLERGIES     Review of patient's allergies indicates no known allergies. FAMILY HISTORY       Family History   Problem Relation Age of Onset    Heart Disease Mother     Heart Disease Father           SOCIAL HISTORY       Social History     Social History    Marital status:      Spouse name: N/A    Number of children: N/A    Years of education: N/A     Social History Main Topics    Smoking status: Former Smoker     Types: Cigarettes    Smokeless tobacco: Never Used      Comment: quit smoking in 85     Alcohol use No    Drug use: No    Sexual activity: Not Asked     Other Topics Concern    None     Social History Narrative    None       SCREENINGS           PHYSICAL EXAM    (up to 7 for level 4, 8 or more for level 5)     ED Triage Vitals [11/14/17 0702]   BP Temp Temp Source Pulse Resp SpO2 Height Weight   (!) 176/100 98.1 °F (36.7 °C) Oral 65 12 95 % 5' 11\" (1.803 m) 175 lb (79.4 kg)       Physical Exam   Constitutional: He is oriented to person, place, and time. He appears well-developed and well-nourished. No distress. HENT:   Head: Normocephalic and atraumatic. Neck: Normal range of motion. Cardiovascular: Normal rate, regular rhythm and normal heart sounds. Exam reveals no gallop and no friction rub. No murmur heard. Pulmonary/Chest: Effort normal and breath sounds normal. No respiratory distress. He has no wheezes. He has no rales. He exhibits no tenderness. Abdominal: Soft. Bowel sounds are normal. He exhibits no distension. There is no tenderness. There is no rebound and no guarding. Musculoskeletal:        Back:    Lymphadenopathy:     He has no cervical adenopathy. Neurological: He is alert and oriented to person, place, and time. He has normal strength. No cranial nerve deficit or sensory deficit. Patient's workup has been negative for any evidence of an acute ischemic cardiac event. Patient states that his pain is not really in his chest is mostly in his upper back. Patient declined nitro today. He has an appointment coming up with Dr. Antonio Craft, orthopedic surgeon for his chronic back pain on November 20. Obed Tao #[id number] patient had 20 hydrocodone filled on November 10. I discussed with the patient that we will provide him some Norco to help with his back pain until he can get in with Dr. Antonio Craft. He denies any kind of trauma's, he has not had any falls recently. He ambulated in the ED without any difficulty. Neurologically intact. I advised him to follow-up with his primary care doctor this week and was given strong return precautions to the ED if his symptoms change or worsen in any way. PROCEDURES:    Procedures      FINAL IMPRESSION      1. Atypical chest pain    2. Thoracic back pain, unspecified back pain laterality, unspecified chronicity          DISPOSITION/PLAN   DISPOSITION Decision to Discharge    Patient was told that if symptoms worsen or new symptoms develop they are to return to the emergency department immediately. Patient was educated on diagnosis and treatment plan. All of patient's questions were answered, and the patient understands the discharge plan. I do not feel the patient has a life-threatening condition at this time. Patient is to be discharged.       PATIENT REFERRED TO:  Elizabeth Mac MD  [address] [place] [address] [address]    Schedule an appointment as soon as possible for a visit       Xavi Live MD  [address] [place] [address]  [phone]    Go on 11/20/2017      [address] EMERGENCY DEPT  [facility]  [phone]    As needed, If symptoms worsen      DISCHARGE MEDICATIONS:  Discharge Medication List as of 11/14/2017  9:37 AM      START taking these medications    Details   !! HYDROcodone-acetaminophen (NORCO) 5-325 MG per tablet Take 1 tablet by mouth every 6 hours as needed for Pain ., Disp-20 tablet, R-0Print       !! - Potential duplicate medications found. Please discuss with provider.           (Please note that portions of this note were completed with a voice recognition program.  Efforts were made to edit the dictations but occasionally words are mis-transcribed.)    RAAD [place]on [place]  11/14/17 3930

## 2017-11-15 ENCOUNTER — HOSPITAL ENCOUNTER (OUTPATIENT)
Dept: MRI IMAGING | Age: 77
Discharge: HOME | End: 2017-11-15
Payer: MEDICARE

## 2017-11-15 DIAGNOSIS — M54.16 LUMBAR RADICULOPATHY: ICD-10-CM

## 2017-11-15 LAB
EKG P AXIS: 51 DEGREES
EKG P-R INTERVAL: 198 MS
EKG Q-T INTERVAL: 406 MS
EKG QRS DURATION: 118 MS
EKG QTC CALCULATION (BAZETT): 403 MS
EKG T AXIS: 45 DEGREES

## 2017-11-15 PROCEDURE — 72148 MRI LUMBAR SPINE W/O DYE: CPT

## 2017-11-18 RX ORDER — CEFDINIR 300 MG/1
300 CAPSULE ORAL 2 TIMES DAILY
Qty: 20 CAPSULE | Refills: 0 | Status: SHIPPED | OUTPATIENT
Start: 2017-11-18 | End: 2017-11-28

## 2017-11-19 ENCOUNTER — HOSPITAL ENCOUNTER (EMERGENCY)
Age: 77
Discharge: HOME | End: 2017-11-19
Attending: EMERGENCY MEDICINE
Payer: MEDICARE

## 2017-11-19 VITALS
DIASTOLIC BLOOD PRESSURE: 95 MMHG | WEIGHT: 175 LBS | OXYGEN SATURATION: 99 % | HEART RATE: 81 BPM | RESPIRATION RATE: 16 BRPM | HEIGHT: 71 IN | SYSTOLIC BLOOD PRESSURE: 155 MMHG | TEMPERATURE: 98.2 F | BODY MASS INDEX: 24.5 KG/M2

## 2017-11-19 DIAGNOSIS — G89.29 OTHER CHRONIC PAIN: ICD-10-CM

## 2017-11-19 DIAGNOSIS — R25.1 TREMOR: ICD-10-CM

## 2017-11-19 DIAGNOSIS — R53.1 GENERAL WEAKNESS: Primary | ICD-10-CM

## 2017-11-19 LAB
ALBUMIN SERPL-MCNC: 4 G/DL (ref 3.5–5.2)
ALP BLD-CCNC: 57 U/L (ref 40–130)
ALT SERPL-CCNC: 14 U/L (ref 5–41)
ANION GAP SERPL CALCULATED.3IONS-SCNC: 10 MMOL/L (ref 7–19)
AST SERPL-CCNC: 13 U/L (ref 5–40)
BILIRUB SERPL-MCNC: 0.6 MG/DL (ref 0.2–1.2)
BILIRUBIN URINE: NEGATIVE
BLOOD, URINE: NEGATIVE
BUN BLDV-MCNC: 12 MG/DL (ref 8–23)
CALCIUM SERPL-MCNC: 9 MG/DL (ref 8.8–10.2)
CHLORIDE BLD-SCNC: 102 MMOL/L (ref 98–111)
CLARITY: CLEAR
CO2: 25 MMOL/L (ref 22–29)
COLOR: YELLOW
CREAT SERPL-MCNC: 1.1 MG/DL (ref 0.5–1.2)
GFR NON-AFRICAN AMERICAN: >60
GLUCOSE BLD-MCNC: 118 MG/DL (ref 74–109)
GLUCOSE URINE: NEGATIVE MG/DL
INR BLD: 1.1 (ref 0.88–1.18)
KETONES, URINE: NEGATIVE MG/DL
LEUKOCYTE ESTERASE, URINE: NEGATIVE
NITRITE, URINE: NEGATIVE
PH UA: 6
POTASSIUM SERPL-SCNC: 3.9 MMOL/L (ref 3.5–5)
PROTEIN UA: NEGATIVE MG/DL
PROTHROMBIN TIME: 14.1 SEC (ref 12–14.6)
SODIUM BLD-SCNC: 137 MMOL/L (ref 136–145)
SPECIFIC GRAVITY UA: 1.01
TOTAL CK: 59 U/L (ref 39–308)
TOTAL PROTEIN: 7 G/DL (ref 6.6–8.7)
UROBILINOGEN, URINE: 1 E.U./DL

## 2017-11-19 PROCEDURE — 82550 ASSAY OF CK (CPK): CPT

## 2017-11-19 PROCEDURE — 80053 COMPREHEN METABOLIC PANEL: CPT

## 2017-11-19 PROCEDURE — 85610 PROTHROMBIN TIME: CPT

## 2017-11-19 PROCEDURE — 85025 COMPLETE CBC W/AUTO DIFF WBC: CPT

## 2017-11-19 PROCEDURE — 99283 EMERGENCY DEPT VISIT LOW MDM: CPT | Performed by: EMERGENCY MEDICINE

## 2017-11-19 PROCEDURE — 36415 COLL VENOUS BLD VENIPUNCTURE: CPT

## 2017-11-19 PROCEDURE — 93005 ELECTROCARDIOGRAM TRACING: CPT

## 2017-11-19 PROCEDURE — 81003 URINALYSIS AUTO W/O SCOPE: CPT

## 2017-11-19 PROCEDURE — 99284 EMERGENCY DEPT VISIT MOD MDM: CPT

## 2017-11-20 ENCOUNTER — TELEPHONE (OUTPATIENT)
Dept: FAMILY MEDICINE CLINIC | Age: 77
End: 2017-11-20

## 2017-11-20 ENCOUNTER — TRANSCRIBE ORDERS (OUTPATIENT)
Dept: ADMINISTRATIVE | Facility: HOSPITAL | Age: 77
End: 2017-11-20

## 2017-11-20 ENCOUNTER — HOSPITAL ENCOUNTER (OUTPATIENT)
Dept: MRI IMAGING | Facility: HOSPITAL | Age: 77
Discharge: HOME | End: 2017-11-20
Admitting: PHYSICIAN ASSISTANT

## 2017-11-20 DIAGNOSIS — G95.9 CERVICAL MYELOPATHY (HCC): Primary | ICD-10-CM

## 2017-11-20 DIAGNOSIS — M51.36 LUMBAR DEGENERATIVE DISC DISEASE: Primary | ICD-10-CM

## 2017-11-20 DIAGNOSIS — G95.9 CERVICAL MYELOPATHY (HCC): ICD-10-CM

## 2017-11-20 LAB
ACANTHOCYTES: ABNORMAL
ATYPICAL LYMPHOCYTE RELATIVE PERCENT: 5 % (ref 0–8)
BANDED NEUTROPHILS RELATIVE PERCENT: 3 % (ref 0–5)
BASOPHILS ABSOLUTE: 0 K/UL (ref 0–0.2)
BASOPHILS MANUAL: 0 %
BASOPHILS RELATIVE PERCENT: 0 % (ref 0–1)
EKG P AXIS: 38 DEGREES
EKG P-R INTERVAL: 200 MS
EKG Q-T INTERVAL: 392 MS
EKG QRS DURATION: 110 MS
EKG QTC CALCULATION (BAZETT): 413 MS
EKG T AXIS: 7 DEGREES
EOSINOPHILS ABSOLUTE: 0 K/UL (ref 0–0.6)
EOSINOPHILS RELATIVE PERCENT: 0 % (ref 0–5)
HCT VFR BLD CALC: 41.4 % (ref 42–52)
HEMATOLOGY PATH CONSULT: YES
HEMOGLOBIN: 14.2 G/DL (ref 14–18)
LYMPHOCYTES ABSOLUTE: 1.6 K/UL (ref 1.1–4.5)
LYMPHOCYTES RELATIVE PERCENT: 11 % (ref 20–40)
MCH RBC QN AUTO: 29.8 PG (ref 27–31)
MCHC RBC AUTO-ENTMCNC: 34.3 G/DL (ref 33–37)
MCV RBC AUTO: 87 FL (ref 80–94)
MONOCYTES ABSOLUTE: 3 K/UL (ref 0–0.9)
MONOCYTES RELATIVE PERCENT: 31 % (ref 0–10)
NEUTROPHILS ABSOLUTE: 5.1 K/UL (ref 1.5–7.5)
NEUTROPHILS MANUAL: 50 %
NEUTROPHILS RELATIVE PERCENT: 50 % (ref 50–65)
OVALOCYTES: ABNORMAL
PDW BLD-RTO: 13.6 % (ref 11.5–14.5)
PLATELET # BLD: 113 K/UL (ref 130–400)
PLATELET SLIDE REVIEW: ABNORMAL
PMV BLD AUTO: 10 FL (ref 9.4–12.4)
POIKILOCYTES: ABNORMAL
RBC # BLD: 4.76 M/UL (ref 4.7–6.1)
WBC # BLD: 9.7 K/UL (ref 4.8–10.8)

## 2017-11-20 PROCEDURE — 72141 MRI NECK SPINE W/O DYE: CPT

## 2017-11-20 RX ORDER — HYDROCODONE BITARTRATE AND ACETAMINOPHEN 5; 325 MG/1; MG/1
1 TABLET ORAL EVERY 6 HOURS PRN
Qty: 20 TABLET | Refills: 0 | Status: SHIPPED | OUTPATIENT
Start: 2017-11-20 | End: 2017-11-27

## 2017-11-21 ENCOUNTER — TELEPHONE (OUTPATIENT)
Dept: FAMILY MEDICINE CLINIC | Age: 77
End: 2017-11-21

## 2017-11-21 NOTE — TELEPHONE ENCOUNTER
No need to see me next week as long as he has someone to both manage his pain and his issues with his spine.  If he needs another refill, however, he will need to be seen same day

## 2017-11-21 NOTE — ED PROVIDER NOTES
affect. His behavior is normal.       DIAGNOSTIC RESULTS     EKG: All EKG's are interpreted by the Emergency Department Physician who either signs or Co-signs this chart in the absence of a cardiologist.        RADIOLOGY:   Non-plain film images such as CT, Ultrasound and MRI are read by the radiologist. Plain radiographic images are visualized and preliminarily interpreted by the emergency physician with the below findings:        Interpretation per the Radiologist below, if available at the time of this note:    No orders to display         ED BEDSIDE ULTRASOUND:   Performed by ED Physician - none    LABS:  Labs Reviewed   COMPREHENSIVE METABOLIC PANEL - Abnormal; Notable for the following:        Result Value    Glucose 118 (*)     All other components within normal limits   CBC WITH AUTO DIFFERENTIAL - Abnormal; Notable for the following:     Hematocrit 41.4 (*)     Platelets 253 (*)     Lymphocytes % 11.0 (*)     Monocytes % 31.0 (*)     Monocytes # 3.00 (*)     Poikilocytes 1+ (*)     Acanthocytes Occasional (*)     Ovalocytes Occasional (*)     All other components within normal limits   URINALYSIS   PROTIME-INR   CK   POCT TROPONIN       All other labs were within normal range or not returned as of this dictation. EMERGENCY DEPARTMENT COURSE and DIFFERENTIAL DIAGNOSIS/MDM:   Vitals:    Vitals:    11/19/17 1029 11/19/17 1032   BP: (!) 155/95    Pulse: 81    Resp: 16    Temp:  98.2 °F (36.8 °C)   SpO2: 99%    Weight: 175 lb (79.4 kg)    Height: 5' 11\" (1.803 m)        MDM  Number of Diagnoses or Management Options  General weakness:   Other chronic pain:   Tremor:   Diagnosis management comments: Pt with chronic pain issues, no emergency found stable for dc. Multiple unrelated complaints today has spine follow up tomorrow, no more pain meds thru er reviewed jennifer and visits. Labs reassuring.         Amount and/or Complexity of Data Reviewed  Clinical lab tests: reviewed and ordered    Patient Progress  Patient progress: stable        CONSULTS:  None    PROCEDURES:  Unless otherwise noted below, none     Procedures    FINAL IMPRESSION      1. General weakness    2. Tremor    3.  Other chronic pain          DISPOSITION/PLAN   DISPOSITION Decision to Discharge    PATIENT REFERRED TO:  Gale Gar MD  [address]  [phone]    Schedule an appointment as soon as possible for a visit in 3 days      with your spine doctor  tomorrow as scheduled          DISCHARGE MEDICATIONS:  Discharge Medication List as of 11/19/2017 12:11 PM             (Please note that portions of this note were completed with a voice recognition program.  Efforts were made to edit the dictations but occasionally words are mis-transcribed.)    Chiquis Kulkarni MD (electronically signed)  Attending Emergency Physician          Chiquis Kulkarni MD  11/22/17 8637

## 2017-11-22 ASSESSMENT — ENCOUNTER SYMPTOMS
SHORTNESS OF BREATH: 0
ABDOMINAL DISTENTION: 0
COUGH: 0

## 2017-11-27 ENCOUNTER — TELEPHONE (OUTPATIENT)
Dept: FAMILY MEDICINE CLINIC | Age: 77
End: 2017-11-27

## 2017-11-28 NOTE — TELEPHONE ENCOUNTER
Pt is going to talk with his sister to see if he can come in Thursday.  He will call back and let us know

## 2017-11-28 NOTE — TELEPHONE ENCOUNTER
He needs to be seen. I\"m not calling in anything. This is at least the 5th phone call from him in the past two weeks. I can see him today or Thursday.

## 2017-11-28 NOTE — TELEPHONE ENCOUNTER
Pt states he still has an ear infection. He is not able to come in for office visit.  He would like another round of antibiotics

## 2017-12-03 ENCOUNTER — HOSPITAL ENCOUNTER (EMERGENCY)
Age: 77
Discharge: HOME | End: 2017-12-03
Payer: MEDICARE

## 2017-12-03 ENCOUNTER — APPOINTMENT (OUTPATIENT)
Dept: GENERAL RADIOLOGY | Age: 77
End: 2017-12-03
Payer: MEDICARE

## 2017-12-03 ENCOUNTER — APPOINTMENT (OUTPATIENT)
Dept: CT IMAGING | Age: 77
End: 2017-12-03
Payer: MEDICARE

## 2017-12-03 VITALS
WEIGHT: 175 LBS | OXYGEN SATURATION: 94 % | HEIGHT: 71 IN | SYSTOLIC BLOOD PRESSURE: 127 MMHG | RESPIRATION RATE: 16 BRPM | DIASTOLIC BLOOD PRESSURE: 84 MMHG | TEMPERATURE: 97.9 F | BODY MASS INDEX: 24.5 KG/M2 | HEART RATE: 76 BPM

## 2017-12-03 DIAGNOSIS — R11.2 NON-INTRACTABLE VOMITING WITH NAUSEA, UNSPECIFIED VOMITING TYPE: Primary | ICD-10-CM

## 2017-12-03 DIAGNOSIS — R10.9 ABDOMINAL PAIN, UNSPECIFIED ABDOMINAL LOCATION: ICD-10-CM

## 2017-12-03 LAB
ALBUMIN SERPL-MCNC: 4.1 G/DL (ref 3.5–5.2)
ALP BLD-CCNC: 58 U/L (ref 40–130)
ALT SERPL-CCNC: 14 U/L (ref 5–41)
AMYLASE: 71 U/L (ref 28–100)
ANION GAP SERPL CALCULATED.3IONS-SCNC: 11 MMOL/L (ref 7–19)
AST SERPL-CCNC: 14 U/L (ref 5–40)
BASOPHILS ABSOLUTE: 0 K/UL (ref 0–0.2)
BASOPHILS RELATIVE PERCENT: 0 % (ref 0–1)
BILIRUB SERPL-MCNC: 0.5 MG/DL (ref 0.2–1.2)
BILIRUBIN URINE: NEGATIVE
BLOOD, URINE: NEGATIVE
BUN BLDV-MCNC: 10 MG/DL (ref 8–23)
CALCIUM SERPL-MCNC: 9.1 MG/DL (ref 8.8–10.2)
CHLORIDE BLD-SCNC: 103 MMOL/L (ref 98–111)
CLARITY: CLEAR
CO2: 25 MMOL/L (ref 22–29)
COLOR: YELLOW
CREAT SERPL-MCNC: 1.1 MG/DL (ref 0.5–1.2)
EOSINOPHILS ABSOLUTE: 0 K/UL (ref 0–0.6)
EOSINOPHILS RELATIVE PERCENT: 0 % (ref 0–5)
GFR NON-AFRICAN AMERICAN: >60
GLUCOSE BLD-MCNC: 101 MG/DL (ref 74–109)
GLUCOSE URINE: NEGATIVE MG/DL
HCT VFR BLD CALC: 40.4 % (ref 42–52)
HEMOGLOBIN: 13.7 G/DL (ref 14–18)
KETONES, URINE: ABNORMAL MG/DL
LEUKOCYTE ESTERASE, URINE: NEGATIVE
LIPASE: 24 U/L (ref 13–60)
LYMPHOCYTES ABSOLUTE: 1.1 K/UL (ref 1.1–4.5)
LYMPHOCYTES RELATIVE PERCENT: 23.2 % (ref 20–40)
MCH RBC QN AUTO: 29.5 PG (ref 27–31)
MCHC RBC AUTO-ENTMCNC: 33.9 G/DL (ref 33–37)
MCV RBC AUTO: 86.9 FL (ref 80–94)
MONOCYTES ABSOLUTE: 1.8 K/UL (ref 0–0.9)
MONOCYTES RELATIVE PERCENT: 39.5 % (ref 0–10)
NEUTROPHILS ABSOLUTE: 1.7 K/UL (ref 1.5–7.5)
NEUTROPHILS RELATIVE PERCENT: 37.1 % (ref 50–65)
NITRITE, URINE: NEGATIVE
PDW BLD-RTO: 13.5 % (ref 11.5–14.5)
PERFORMED ON: NORMAL
PH UA: 5.5
PLATELET # BLD: 139 K/UL (ref 130–400)
PMV BLD AUTO: 9.6 FL (ref 9.4–12.4)
POC TROPONIN I: 0.01 NG/ML (ref 0–0.08)
POTASSIUM SERPL-SCNC: 3.7 MMOL/L (ref 3.5–5)
PROTEIN UA: NEGATIVE MG/DL
RBC # BLD: 4.65 M/UL (ref 4.7–6.1)
SODIUM BLD-SCNC: 139 MMOL/L (ref 136–145)
SPECIFIC GRAVITY UA: 1.03
TOTAL PROTEIN: 7.1 G/DL (ref 6.6–8.7)
UROBILINOGEN, URINE: 1 E.U./DL
WBC # BLD: 4.6 K/UL (ref 4.8–10.8)

## 2017-12-03 PROCEDURE — 80053 COMPREHEN METABOLIC PANEL: CPT

## 2017-12-03 PROCEDURE — 85025 COMPLETE CBC W/AUTO DIFF WBC: CPT

## 2017-12-03 PROCEDURE — 96376 TX/PRO/DX INJ SAME DRUG ADON: CPT

## 2017-12-03 PROCEDURE — 83690 ASSAY OF LIPASE: CPT

## 2017-12-03 PROCEDURE — 96374 THER/PROPH/DIAG INJ IV PUSH: CPT

## 2017-12-03 PROCEDURE — 99283 EMERGENCY DEPT VISIT LOW MDM: CPT | Performed by: NURSE PRACTITIONER

## 2017-12-03 PROCEDURE — 71010 XR CHEST PORTABLE: CPT

## 2017-12-03 PROCEDURE — 74177 CT ABD & PELVIS W/CONTRAST: CPT

## 2017-12-03 PROCEDURE — 6360000002 HC RX W HCPCS: Performed by: NURSE PRACTITIONER

## 2017-12-03 PROCEDURE — 82150 ASSAY OF AMYLASE: CPT

## 2017-12-03 PROCEDURE — 6360000004 HC RX CONTRAST MEDICATION: Performed by: NURSE PRACTITIONER

## 2017-12-03 PROCEDURE — 84484 ASSAY OF TROPONIN QUANT: CPT

## 2017-12-03 PROCEDURE — 93005 ELECTROCARDIOGRAM TRACING: CPT

## 2017-12-03 PROCEDURE — 99284 EMERGENCY DEPT VISIT MOD MDM: CPT

## 2017-12-03 PROCEDURE — 6360000002 HC RX W HCPCS: Performed by: EMERGENCY MEDICINE

## 2017-12-03 PROCEDURE — 81003 URINALYSIS AUTO W/O SCOPE: CPT

## 2017-12-03 PROCEDURE — 36415 COLL VENOUS BLD VENIPUNCTURE: CPT

## 2017-12-03 RX ORDER — ONDANSETRON 4 MG/1
4 TABLET, FILM COATED ORAL EVERY 8 HOURS PRN
Qty: 20 TABLET | Refills: 0 | Status: SHIPPED | OUTPATIENT
Start: 2017-12-03 | End: 2017-12-12 | Stop reason: SDUPTHER

## 2017-12-03 RX ORDER — ONDANSETRON 2 MG/ML
4 INJECTION INTRAMUSCULAR; INTRAVENOUS EVERY 30 MIN PRN
Status: DISCONTINUED | OUTPATIENT
Start: 2017-12-03 | End: 2017-12-03 | Stop reason: HOSPADM

## 2017-12-03 RX ORDER — ONDANSETRON 2 MG/ML
4 INJECTION INTRAMUSCULAR; INTRAVENOUS ONCE
Status: COMPLETED | OUTPATIENT
Start: 2017-12-03 | End: 2017-12-03

## 2017-12-03 RX ADMIN — IOPAMIDOL 90 ML: 755 INJECTION, SOLUTION INTRAVENOUS at 07:20

## 2017-12-03 RX ADMIN — ONDANSETRON 4 MG: 2 INJECTION INTRAMUSCULAR; INTRAVENOUS at 09:10

## 2017-12-03 RX ADMIN — ONDANSETRON 4 MG: 2 INJECTION INTRAMUSCULAR; INTRAVENOUS at 06:31

## 2017-12-03 ASSESSMENT — PAIN DESCRIPTION - DESCRIPTORS: DESCRIPTORS: CRAMPING;TIGHTNESS

## 2017-12-03 ASSESSMENT — PAIN SCALES - GENERAL: PAINLEVEL_OUTOF10: 0

## 2017-12-03 ASSESSMENT — ENCOUNTER SYMPTOMS
RESPIRATORY NEGATIVE: 1
ABDOMINAL PAIN: 1
CONSTIPATION: 1
EYES NEGATIVE: 1

## 2017-12-03 NOTE — ED PROVIDER NOTES
Department Physician who either signs or Co-signs this chart in the absence of a cardiologist.        RADIOLOGY:   Non-plain film images such as CT, Ultrasound and MRI are read by the radiologist. Plain radiographic images are visualized and preliminarily interpreted by the emergency physician with the below findings:        Interpretation per the Radiologist below, if available at the time of this note:    CT ABDOMEN PELVIS W IV CONTRAST Additional Contrast? None   Final Result   1. No acute intra-abdominal or pelvic abnormality is identified. 2. Fusiform aneurysmal dilatation of the abdominal aorta with maximum   diameter of 3 cm and no evidence of rupture. Advanced atherosclerosis   of the aorta and its branches is demonstrated as detailed above. 3. Fatty infiltration of the liver and evidence of previous   cholecystectomy. 4. Extensive sigmoid diverticulosis without evidence of acute   diverticulitis. There is mild fluid retention within the   gastrointestinal tract. 5. Evidence of previous left inguinal hernia repair with a small   fat-containing recurrent hernia measuring 1.5 cm. Signed by Dr Esthela Vazquez on 12/3/2017 7:48 AM      XR Chest Portable   Final Result   No acute cardiopulmonary abnormality. Signed by Dr Esthela Vazquez on 12/3/2017 7:16 AM            ED BEDSIDE ULTRASOUND:   Performed by ED Physician - none    LABS:  Labs Reviewed   CBC WITH AUTO DIFFERENTIAL - Abnormal; Notable for the following:        Result Value    WBC 4.6 (*)     RBC 4.65 (*)     Hemoglobin 13.7 (*)     Hematocrit 40.4 (*)     Neutrophils % 37.1 (*)     Monocytes % 39.5 (*)     Monocytes # 1.80 (*)     All other components within normal limits   URINALYSIS - Abnormal; Notable for the following:     Ketones, Urine TRACE (*)     All other components within normal limits   AMYLASE   COMPREHENSIVE METABOLIC PANEL   LIPASE   POCT TROPONIN   POCT VENOUS       All other labs were within normal range or not returned as of this dictation. EMERGENCY DEPARTMENT COURSE and DIFFERENTIAL DIAGNOSIS/MDM:   Vitals:    Vitals:    12/03/17 0752 12/03/17 0803 12/03/17 0832 12/03/17 1007   BP: (!) 169/78 134/76 128/82 127/84   Pulse: 68 68 74 76   Resp:    16   Temp:       TempSrc:       SpO2: 93% 94%     Weight:       Height:               MDM  Number of Diagnoses or Management Options     Amount and/or Complexity of Data Reviewed  Clinical lab tests: reviewed and ordered    Risk of Complications, Morbidity, and/or Mortality  Presenting problems: minimal  Diagnostic procedures: minimal  Management options: minimal            CONSULTS:  None    PROCEDURES:  Unless otherwise noted below, none     Procedures    FINAL IMPRESSION      1. Non-intractable vomiting with nausea, unspecified vomiting type    2.  Abdominal pain, unspecified abdominal location          DISPOSITION/PLAN   DISPOSITION Decision to Discharge    PATIENT REFERRED TO:  Dread Banks MD  [address] [address] [phone]    In 1 day  As scheduled      DISCHARGE MEDICATIONS:  Discharge Medication List as of 12/3/2017  9:13 AM      START taking these medications    Details   ondansetron (ZOFRAN) 4 MG tablet Take 1 tablet by mouth every 8 hours as needed for Nausea or Vomiting, Disp-20 tablet, R-0Print                (Please note that portions of this note were completed with a voice recognition program.  Efforts were made to edit the dictations but occasionally words are mis-transcribed.)    BRENT Salguero (electronically signed)  Attending Emergency Physician        BRENT Salguero  12/03/17 1481

## 2017-12-03 NOTE — ED NOTES
Patient placed in a gown  Patient placed on cardiac monitor, continuous pulse oximeter, and NIBP monitor. Monitor alarms on.          Sabine Alvarez RN  12/03/17 0600

## 2017-12-04 ENCOUNTER — OFFICE VISIT (OUTPATIENT)
Dept: FAMILY MEDICINE CLINIC | Age: 77
End: 2017-12-04
Payer: MEDICARE

## 2017-12-04 VITALS
SYSTOLIC BLOOD PRESSURE: 124 MMHG | TEMPERATURE: 98.5 F | RESPIRATION RATE: 18 BRPM | BODY MASS INDEX: 24.41 KG/M2 | HEART RATE: 78 BPM | DIASTOLIC BLOOD PRESSURE: 84 MMHG | OXYGEN SATURATION: 94 % | WEIGHT: 175 LBS

## 2017-12-04 DIAGNOSIS — M54.50 CHRONIC BILATERAL LOW BACK PAIN WITHOUT SCIATICA: ICD-10-CM

## 2017-12-04 DIAGNOSIS — H61.22 IMPACTED CERUMEN OF LEFT EAR: ICD-10-CM

## 2017-12-04 DIAGNOSIS — G89.29 CHRONIC BILATERAL LOW BACK PAIN WITHOUT SCIATICA: ICD-10-CM

## 2017-12-04 DIAGNOSIS — G70.9 NEUROMUSCULAR DISORDER (HCC): Primary | ICD-10-CM

## 2017-12-04 PROCEDURE — 99214 OFFICE O/P EST MOD 30 MIN: CPT | Performed by: FAMILY MEDICINE

## 2017-12-04 PROCEDURE — 4040F PNEUMOC VAC/ADMIN/RCVD: CPT | Performed by: FAMILY MEDICINE

## 2017-12-04 PROCEDURE — G8420 CALC BMI NORM PARAMETERS: HCPCS | Performed by: FAMILY MEDICINE

## 2017-12-04 PROCEDURE — 1036F TOBACCO NON-USER: CPT | Performed by: FAMILY MEDICINE

## 2017-12-04 PROCEDURE — 3017F COLORECTAL CA SCREEN DOC REV: CPT | Performed by: FAMILY MEDICINE

## 2017-12-04 PROCEDURE — G8427 DOCREV CUR MEDS BY ELIG CLIN: HCPCS | Performed by: FAMILY MEDICINE

## 2017-12-04 PROCEDURE — G8484 FLU IMMUNIZE NO ADMIN: HCPCS | Performed by: FAMILY MEDICINE

## 2017-12-04 PROCEDURE — 1123F ACP DISCUSS/DSCN MKR DOCD: CPT | Performed by: FAMILY MEDICINE

## 2017-12-04 PROCEDURE — 69210 REMOVE IMPACTED EAR WAX UNI: CPT | Performed by: FAMILY MEDICINE

## 2017-12-04 RX ORDER — CEFDINIR 300 MG/1
300 CAPSULE ORAL 2 TIMES DAILY
Refills: 0 | COMMUNITY
Start: 2017-11-30 | End: 2017-12-04 | Stop reason: ALTCHOICE

## 2017-12-04 RX ORDER — HYDROCODONE BITARTRATE AND ACETAMINOPHEN 10; 325 MG/1; MG/1
1 TABLET ORAL EVERY 6 HOURS PRN
Qty: 20 TABLET | Refills: 0 | Status: SHIPPED | OUTPATIENT
Start: 2017-12-04 | End: 2017-12-11

## 2017-12-04 ASSESSMENT — ENCOUNTER SYMPTOMS
CONSTIPATION: 0
DIARRHEA: 0
SHORTNESS OF BREATH: 0
CHEST TIGHTNESS: 0
NAUSEA: 0
ABDOMINAL PAIN: 0
COUGH: 0
ANAL BLEEDING: 0
BACK PAIN: 1

## 2017-12-04 NOTE — PROGRESS NOTES
Wt 175 lb (79.4 kg)   SpO2 94%   BMI 24.41 kg/m²   Physical Exam   Constitutional: He appears well-developed. He does not appear ill. HENT:   Cerumen impaction on the left external auditory canal   Eyes: Pupils are equal, round, and reactive to light. Neck: Normal range of motion. Neck supple. Cardiovascular: Normal rate and regular rhythm. Exam reveals no friction rub. No murmur heard. Pulmonary/Chest: Effort normal and breath sounds normal. No respiratory distress. He has no wheezes. He has no rales. Abdominal: Soft. Bowel sounds are normal. He exhibits no distension. There is no tenderness. There is no rebound and no guarding. Musculoskeletal: He exhibits no edema. Neurological: He is alert. No cranial nerve deficit. He exhibits abnormal muscle tone. High-frequency tremor the upper extremities. Normal  strength. Extraocular movements intact. No tongue deviation. Psychiatric: He has a normal mood and affect.  His behavior is normal.         Recent Results (from the past 672 hour(s))   POCT Venous    Collection Time: 11/14/17  7:06 AM   Result Value Ref Range    POC Troponin I 0.01 0.00 - 0.08 ng/mL    Performed on i-Stat    EKG 12 Lead    Collection Time: 11/14/17  7:06 AM   Result Value Ref Range    P-R Interval 198 ms    QRS Duration 118 ms    Q-T Interval 406 ms    QTc Calculation (Bazett) 403 ms    P Axis 51 degrees    T Axis 45 degrees   CBC Auto Differential    Collection Time: 11/14/17  7:37 AM   Result Value Ref Range    WBC 10.1 4.8 - 10.8 K/uL    RBC 4.91 4.70 - 6.10 M/uL    Hemoglobin 14.7 14.0 - 18.0 g/dL    Hematocrit 43.2 42.0 - 52.0 %    MCV 88.0 80.0 - 94.0 fL    MCH 29.9 27.0 - 31.0 pg    MCHC 34.0 33.0 - 37.0 g/dL    RDW 13.5 11.5 - 14.5 %    Platelets 342 948 - 262 K/uL    MPV 10.3 9.4 - 12.4 fL    PLATELET SLIDE REVIEW Adequate     Neutrophils % 57.0 50.0 - 65.0 %    Lymphocytes % 9.0 (L) 20.0 - 40.0 %    Monocytes % 32.0 (H) 0.0 - 10.0 %    Eosinophils % 0.0 0.0 - 5.0 %    Basophils % 0.0 0.0 - 1.0 %    Neutrophils # 5.9 1.5 - 7.5 K/uL    Lymphocytes # 1.0 (L) 1.1 - 4.5 K/uL    Monocytes # 3.20 (H) 0.00 - 0.90 K/uL    Eosinophils # 0.00 0.00 - 0.60 K/uL    Basophils # 0.00 0.00 - 0.20 K/uL    Neutrophils Manual 57 %    Bands Relative 1 0 - 5 %    Atypical Lymphocytes Relative 1 0 - 8 %    Basophils Manual 0 %    Ovalocytes Occasional (A)    Comprehensive Metabolic Panel    Collection Time: 11/14/17  7:37 AM   Result Value Ref Range    Sodium 139 136 - 145 mmol/L    Potassium 4.2 3.5 - 5.0 mmol/L    Chloride 99 98 - 111 mmol/L    CO2 28 22 - 29 mmol/L    Anion Gap 12 7 - 19 mmol/L    Glucose 105 74 - 109 mg/dL    BUN 18 8 - 23 mg/dL    CREATININE 1.1 0.5 - 1.2 mg/dL    GFR Non-African American >60 >60    Calcium 9.9 8.8 - 10.2 mg/dL    Total Protein 7.9 6.6 - 8.7 g/dL    Alb 4.6 3.5 - 5.2 g/dL    Total Bilirubin 0.6 0.2 - 1.2 mg/dL    Alkaline Phosphatase 53 40 - 130 U/L    ALT 16 5 - 41 U/L    AST 16 5 - 40 U/L   EKG 12 Lead    Collection Time: 11/19/17 10:42 AM   Result Value Ref Range    P-R Interval 200 ms    QRS Duration 110 ms    Q-T Interval 392 ms    QTc Calculation (Bazett) 413 ms    P Axis 38 degrees    T Axis 7 degrees   Comprehensive Metabolic Panel    Collection Time: 11/19/17 11:00 AM   Result Value Ref Range    Sodium 137 136 - 145 mmol/L    Potassium 3.9 3.5 - 5.0 mmol/L    Chloride 102 98 - 111 mmol/L    CO2 25 22 - 29 mmol/L    Anion Gap 10 7 - 19 mmol/L    Glucose 118 (H) 74 - 109 mg/dL    BUN 12 8 - 23 mg/dL    CREATININE 1.1 0.5 - 1.2 mg/dL    GFR Non-African American >60 >60    Calcium 9.0 8.8 - 10.2 mg/dL    Total Protein 7.0 6.6 - 8.7 g/dL    Alb 4.0 3.5 - 5.2 g/dL    Total Bilirubin 0.6 0.2 - 1.2 mg/dL    Alkaline Phosphatase 57 40 - 130 U/L    ALT 14 5 - 41 U/L    AST 13 5 - 40 U/L   CBC Auto Differential    Collection Time: 11/19/17 11:00 AM   Result Value Ref Range    WBC 9.7 4.8 - 10.8 K/uL    RBC 4.76 4.70 - 6.10 M/uL    Hemoglobin 14.2 14.0 - 18.0 g/dL    Hematocrit 41.4 (L) 42.0 - 52.0 %    MCV 87.0 80.0 - 94.0 fL    MCH 29.8 27.0 - 31.0 pg    MCHC 34.3 33.0 - 37.0 g/dL    RDW 13.6 11.5 - 14.5 %    Platelets 246 (L) 451 - 400 K/uL    MPV 10.0 9.4 - 12.4 fL    PLATELET SLIDE REVIEW Decreased     Path Consult Yes     Neutrophils % 50.0 50.0 - 65.0 %    Lymphocytes % 11.0 (L) 20.0 - 40.0 %    Monocytes % 31.0 (H) 0.0 - 10.0 %    Eosinophils % 0.0 0.0 - 5.0 %    Basophils % 0.0 0.0 - 1.0 %    Neutrophils # 5.1 1.5 - 7.5 K/uL    Lymphocytes # 1.6 1.1 - 4.5 K/uL    Monocytes # 3.00 (H) 0.00 - 0.90 K/uL    Eosinophils # 0.00 0.00 - 0.60 K/uL    Basophils # 0.00 0.00 - 0.20 K/uL    Neutrophils Manual 50 %    Bands Relative 3 0 - 5 %    Atypical Lymphocytes Relative 5 0 - 8 %    Basophils Manual 0 %    Poikilocytes 1+ (A)     Acanthocytes Occasional (A)     Ovalocytes Occasional (A)    Protime-INR    Collection Time: 11/19/17 11:00 AM   Result Value Ref Range    Protime 14.1 12.0 - 14.6 sec    INR 1.10 0.88 - 1.18   CK    Collection Time: 11/19/17 11:00 AM   Result Value Ref Range    Total CK 59 39 - 308 U/L   Urinalysis    Collection Time: 11/19/17 11:49 AM   Result Value Ref Range    Color, UA YELLOW Straw/Yellow    Clarity, UA Clear Clear    Glucose, Ur Negative Negative mg/dL    Bilirubin Urine Negative Negative    Ketones, Urine Negative Negative mg/dL    Specific Gravity, UA 1.014 1.005 - 1.030    Blood, Urine Negative Negative    pH, UA 6.0 5.0 - 8.0    Protein, UA Negative Negative mg/dL    Urobilinogen, Urine 1.0 <2.0 E.U./dL    Nitrite, Urine Negative Negative    Leukocyte Esterase, Urine Negative Negative   EKG 12 Lead    Collection Time: 12/03/17  5:53 AM   Result Value Ref Range    P-R Interval 196 ms    QRS Duration 120 ms    Q-T Interval 428 ms    QTc Calculation (Bazett) 428 ms    P Axis 52 degrees    T Axis 23 degrees   Amylase    Collection Time: 12/03/17  6:00 AM   Result Value Ref Range    Amylase 71 28 - 100 U/L   CBC Auto Differential Collection Time: 12/03/17  6:00 AM   Result Value Ref Range    WBC 4.6 (L) 4.8 - 10.8 K/uL    RBC 4.65 (L) 4.70 - 6.10 M/uL    Hemoglobin 13.7 (L) 14.0 - 18.0 g/dL    Hematocrit 40.4 (L) 42.0 - 52.0 %    MCV 86.9 80.0 - 94.0 fL    MCH 29.5 27.0 - 31.0 pg    MCHC 33.9 33.0 - 37.0 g/dL    RDW 13.5 11.5 - 14.5 %    Platelets 747 509 - 841 K/uL    MPV 9.6 9.4 - 12.4 fL    Neutrophils % 37.1 (L) 50.0 - 65.0 %    Lymphocytes % 23.2 20.0 - 40.0 %    Monocytes % 39.5 (H) 0.0 - 10.0 %    Eosinophils % 0.0 0.0 - 5.0 %    Basophils % 0.0 0.0 - 1.0 %    Neutrophils # 1.7 1.5 - 7.5 K/uL    Lymphocytes # 1.1 1.1 - 4.5 K/uL    Monocytes # 1.80 (H) 0.00 - 0.90 K/uL    Eosinophils # 0.00 0.00 - 0.60 K/uL    Basophils # 0.00 0.00 - 0.20 K/uL   Comprehensive Metabolic Panel    Collection Time: 12/03/17  6:00 AM   Result Value Ref Range    Sodium 139 136 - 145 mmol/L    Potassium 3.7 3.5 - 5.0 mmol/L    Chloride 103 98 - 111 mmol/L    CO2 25 22 - 29 mmol/L    Anion Gap 11 7 - 19 mmol/L    Glucose 101 74 - 109 mg/dL    BUN 10 8 - 23 mg/dL    CREATININE 1.1 0.5 - 1.2 mg/dL    GFR Non-African American >60 >60    Calcium 9.1 8.8 - 10.2 mg/dL    Total Protein 7.1 6.6 - 8.7 g/dL    Alb 4.1 3.5 - 5.2 g/dL    Total Bilirubin 0.5 0.2 - 1.2 mg/dL    Alkaline Phosphatase 58 40 - 130 U/L    ALT 14 5 - 41 U/L    AST 14 5 - 40 U/L   Lipase    Collection Time: 12/03/17  6:00 AM   Result Value Ref Range    Lipase 24 13 - 60 U/L   Urinalysis    Collection Time: 12/03/17  8:20 AM   Result Value Ref Range    Color, UA YELLOW Straw/Yellow    Clarity, UA Clear Clear    Glucose, Ur Negative Negative mg/dL    Bilirubin Urine Negative Negative    Ketones, Urine TRACE (A) Negative mg/dL    Specific Gravity, UA 1.026 1.005 - 1.030    Blood, Urine Negative Negative    pH, UA 5.5 5.0 - 8.0    Protein, UA Negative Negative mg/dL    Urobilinogen, Urine 1.0 <2.0 E.U./dL    Nitrite, Urine Negative Negative    Leukocyte Esterase, Urine Negative Negative   POCT Venous and mild right foraminal stenosis. C6-C7: Disc osteophyte complex and ligamentum flavum hypertrophy  without significant thecal sac stenosis. Facet and uncovertebral  arthrosis, asymmetric to the left, with resulting moderate to severe  left and mild-to-moderate right foraminal stenosis. C7-T1: No significant spinal canal stenosis. No significant foraminal  stenosis. Impression:  1. There is less than 50% stenosis of the right internal carotid artery. 2. There is less than 50% stenosis of the left internal carotid artery. 3. Antegrade flow is demonstrated in bilateral vertebral arteries. Comments: Bilateral carotid vertebral arterial duplex scan was  performed. Grayscale imaging shows intimal thickening and calcified elements at the  carotid bifurcation. The right internal carotid artery peak systolic  velocity is 42.1 cm/sec. The end-diastolic velocity is 07.9 cm/sec. The  right ICA/CCA ratio is approximately 1.15 . These findings correlate  with less than 50% stenosis of the right internal carotid artery. Grayscale imaging shows intimal thickening and calcified elements at the  carotid bifurcation. The left internal carotid artery peak systolic  velocity is 23.1 cm/sec. The end-diastolic velocity is 15.9 cm/sec. The  left ICA/CCA ratio is approximately 0.61 . These findings correlate with  less than 50% stenosis of the left internal carotid artery. Antegrade flow is demonstrated in bilateral vertebral arteries. There is greater than 50% stenosis of the left common carotid artery and  the left external carotid artery. This report was finalized on 11/02/2017 16:28 by Dr. Margareth Frye MD.    Left ventricular systolic function is normal. Estimated EF appears to be   in the range of 61 - 65%. · Left ventricular diastolic dysfunction (grade I) consistent with   impaired relaxation. · Left ventricular wall thickness is consistent with borderline concentric   hypertrophy.   · No evidence of a patent foramen ovale. · No significant valvular abnormalities are identified. Impression:  No acute infarction, hemorrhage, or mass. This report was finalized on 11/02/2017 08:30 by  Vince Finley, . Result Narrative     History:  44-year-old evaluated for stroke. Right-sided leg weakness. Reference   CT head 1 day previous. MRI brain January 2017. Technique  Routine unenhanced brain MRI. Findings  There is no area of restricted diffusion to suggest acute infarction. No  extra-axial fluid collection. No chronic cortical infarction is  observed. There is generalized atrophy with commensurate ventricular  enlargement. No convincing hydrocephalus. Mild microangiopathy of the  periventricular white matter. The major vascular flow voids are maintained. Mucosal thickening left  maxillary sinus. Degenerative changes of the cervical spine which appear most  pronounced at C3-C4. A right lateral posterior osteophyte at this  location results in mild to moderate thecal sac stenosis as well as  severe right neuroforaminal narrowing. Neuroforaminal narrowing is also  noted at multiple additional levels of the cervical spine. This report was finalized on 11/02/2017 08:45 by Dr. Amina Morales MD.    Impression:   1. No evidence of acute osseous injury in the lumbar spine. 2. Superior endplate deformities of L1 and L2 appear to be chronic and  degenerative in nature. 3. Degenerative disc disease at L4-L5 results in mild spinal canal  stenosis. Neuroforaminal narrowing is noted throughout the lumbar spine. 4. Infrarenal abdominal aortic aneurysm, with maximum diameter measuring  3.1 cm. .      This report was finalized on 08/11/2017 10:50 by Dr. Amina Morales MD.   Result Narrative   EXAMINATION: CT LUMBAR SPINE WO CONTRAST- 8/11/2017 9:31 CST    HISTORY: Low back pain and numbness with no loss of bowel or bladder  control    Comparison: None     DLP: 412 mGy cm    Technique: Serial helical ------------------------   ---------  SSB ([place])                 SLE                             10%                           Sjogren's Syndrome              30%  ---------------------   -----------------------    ---------  Sm (anti-Zuñiga)          SLE                        15 - 30%  ---------------------   -----------------------    ---------  RNP                      Mixed Connective Tissue                           Disease                         95%  (U1 nRNP,                SLE                        30 - 50%  anti-ribonucleoprotein)  Polymyositis and/or                           Dermatomyositis                 20%  ---------------------   ------------------------   ---------  Scl-70 (antiDNA          Scleroderma (diffuse)      20 - 35%  topoisomerase)           Crest                           13%  ---------------------   ------------------------   ---------  Nancy-1                     Polymyositis and/or                           Dermatomyositis            20 - 40%  ---------------------   ------------------------   ---------  Centromere B             Scleroderma - Crest                           variant                         80%     Specimen             Assessment & Plan: The following diagnoses and conditions are stable with no further orders unless indicated:  1. Neuromuscular disorder ([address]  No clear etiology to his current symptoms. It is possible he has developed neuropathy from his chronic spinal issues however there is not evidence of severe spinal stenosis. Discussed the case with Dr. Yessica Keane last week. He was recommended and a neurology consultation prior to consider any surgical intervention. We have arranged for evaluation with neurology tomorrow morning  - [facility] [place] [facility]- Praneeth Gama MD    2. Chronic bilateral low back pain without sciatica  Discussed risk and benefits of taking opioids. Risks include addiction and tolerance.   If develops impairment or over sedation with medication, discontinue and contact me. Do not take medication with alcohol. Advised to take sparingly. Advised to keep medication hidden and locked up as risk of theft is high with these medications as well.     - HYDROcodone-acetaminophen (NORCO)  MG per tablet; Take 1 tablet by mouth every 6 hours as needed for Pain . Dispense: 20 tablet; Refill: 0    3. Impacted cerumen of left ear  Discussed the risks and benefits of proceeding with procedure to remove impacted earwax. These risks include potential pain, bleeding, and a rare incidence of perforated eardrum. He agreed to proceed. Otoscope used to evaluate impaction of cerumen in left external auditory canal. Using a looped curet, removed impacted cerumen from external auditory canal.   Once completed, had the nurse apply drops of hydrogen peroxide to loosen remaining earwax. Using water irrigation, removed excess earwax. Patient tolerated procedure well. TM was observed through the otoscope with no abnormalities. Advised to return to clinic if symptoms worsen. Procedure took 15 minutes duration. We were unable to fully remove all of the earwax will place on  - carbamide peroxide (AURO EARDROPS) 6.5 % otic solution; Place 5 drops into both ears 2 times daily  Dispense: 15 mL; Refill: 2  Return to clinic in 2 weeks if symptoms persist    Patient was here today for 45 minutes. 30 minutes of that was spent face-to-face time with him reviewing his past medical history, laboratory work, imaging studies and attempting to remove excess earwax from his left external auditory canal      Return in about 1 month (around 1/4/2018) for Routine follow up. Discussed use, benefit, and side effects of prescribed medications. All patient questions answered. Pt voiced understanding. Reviewed health maintenance. Instructed to continue current medications, diet and exercise. Patient agreed with treatment plan. Follow up as directed.

## 2017-12-05 ENCOUNTER — OFFICE VISIT (OUTPATIENT)
Dept: NEUROSURGERY | Age: 77
End: 2017-12-05
Payer: MEDICARE

## 2017-12-05 VITALS
BODY MASS INDEX: 24.5 KG/M2 | HEART RATE: 69 BPM | OXYGEN SATURATION: 95 % | DIASTOLIC BLOOD PRESSURE: 74 MMHG | HEIGHT: 71 IN | WEIGHT: 175 LBS | SYSTOLIC BLOOD PRESSURE: 136 MMHG

## 2017-12-05 DIAGNOSIS — R47.9 SPEECH DISTURBANCE, UNSPECIFIED TYPE: ICD-10-CM

## 2017-12-05 DIAGNOSIS — R53.83 OTHER FATIGUE: ICD-10-CM

## 2017-12-05 DIAGNOSIS — R26.9 GAIT DIFFICULTY: ICD-10-CM

## 2017-12-05 DIAGNOSIS — R25.1 TREMOR: ICD-10-CM

## 2017-12-05 DIAGNOSIS — R20.0 NUMBNESS: ICD-10-CM

## 2017-12-05 DIAGNOSIS — R20.0 NUMBNESS: Primary | ICD-10-CM

## 2017-12-05 LAB
ALBUMIN SERPL-MCNC: 4.1 G/DL (ref 3.5–5.2)
ALP BLD-CCNC: 60 U/L (ref 40–130)
ALT SERPL-CCNC: 17 U/L (ref 5–41)
ANION GAP SERPL CALCULATED.3IONS-SCNC: 12 MMOL/L (ref 7–19)
ANISOCYTOSIS: ABNORMAL
AST SERPL-CCNC: 17 U/L (ref 5–40)
ATYPICAL LYMPHOCYTE RELATIVE PERCENT: 7 % (ref 0–8)
BANDED NEUTROPHILS RELATIVE PERCENT: 4 % (ref 0–5)
BASOPHILS ABSOLUTE: 0 K/UL (ref 0–0.2)
BASOPHILS MANUAL: 0 %
BASOPHILS RELATIVE PERCENT: 0 % (ref 0–1)
BILIRUB SERPL-MCNC: 0.5 MG/DL (ref 0.2–1.2)
BUN BLDV-MCNC: 10 MG/DL (ref 8–23)
C-REACTIVE PROTEIN: 0.21 MG/DL (ref 0–0.5)
CALCIUM SERPL-MCNC: 9.6 MG/DL (ref 8.8–10.2)
CHLORIDE BLD-SCNC: 98 MMOL/L (ref 98–111)
CO2: 27 MMOL/L (ref 22–29)
CREAT SERPL-MCNC: 1.1 MG/DL (ref 0.5–1.2)
EKG P AXIS: 52 DEGREES
EKG P-R INTERVAL: 196 MS
EKG Q-T INTERVAL: 428 MS
EKG QRS DURATION: 120 MS
EKG QTC CALCULATION (BAZETT): 428 MS
EKG T AXIS: 23 DEGREES
EOSINOPHILS ABSOLUTE: 0.23 K/UL (ref 0–0.6)
EOSINOPHILS RELATIVE PERCENT: 4 % (ref 0–5)
GFR NON-AFRICAN AMERICAN: >60
GLUCOSE BLD-MCNC: 105 MG/DL (ref 74–109)
HCT VFR BLD CALC: 41.7 % (ref 42–52)
HEMOGLOBIN: 13.8 G/DL (ref 14–18)
LYMPHOCYTES ABSOLUTE: 1.1 K/UL (ref 1.1–4.5)
LYMPHOCYTES RELATIVE PERCENT: 13 % (ref 20–40)
MCH RBC QN AUTO: 29.5 PG (ref 27–31)
MCHC RBC AUTO-ENTMCNC: 33.1 G/DL (ref 33–37)
MCV RBC AUTO: 89.1 FL (ref 80–94)
MONOCYTES ABSOLUTE: 1.3 K/UL (ref 0–0.9)
MONOCYTES RELATIVE PERCENT: 23 % (ref 0–10)
NEUTROPHILS ABSOLUTE: 3 K/UL (ref 1.5–7.5)
NEUTROPHILS MANUAL: 49 %
NEUTROPHILS RELATIVE PERCENT: 49 % (ref 50–65)
PDW BLD-RTO: 14.1 % (ref 11.5–14.5)
PLATELET # BLD: 176 K/UL (ref 130–400)
PLATELET SLIDE REVIEW: ADEQUATE
PMV BLD AUTO: 9.8 FL (ref 9.4–12.4)
POIKILOCYTES: ABNORMAL
POTASSIUM SERPL-SCNC: 4.2 MMOL/L (ref 3.5–5)
RBC # BLD: 4.68 M/UL (ref 4.7–6.1)
RHEUMATOID FACTOR: 18 IU/ML
SEDIMENTATION RATE, ERYTHROCYTE: 7 MM/HR (ref 0–15)
SODIUM BLD-SCNC: 137 MMOL/L (ref 136–145)
T4 FREE: 1.3 NG/DL (ref 0.9–1.7)
TOTAL CK: 85 U/L (ref 39–308)
TOTAL PROTEIN: 7.4 G/DL (ref 6.6–8.7)
TSH SERPL DL<=0.05 MIU/L-ACNC: 4.6 UIU/ML (ref 0.27–4.2)
VITAMIN B-12: 492 PG/ML (ref 211–946)
WBC # BLD: 5.7 K/UL (ref 4.8–10.8)

## 2017-12-05 PROCEDURE — 1123F ACP DISCUSS/DSCN MKR DOCD: CPT | Performed by: PSYCHIATRY & NEUROLOGY

## 2017-12-05 PROCEDURE — 3017F COLORECTAL CA SCREEN DOC REV: CPT | Performed by: PSYCHIATRY & NEUROLOGY

## 2017-12-05 PROCEDURE — G8420 CALC BMI NORM PARAMETERS: HCPCS | Performed by: PSYCHIATRY & NEUROLOGY

## 2017-12-05 PROCEDURE — 1036F TOBACCO NON-USER: CPT | Performed by: PSYCHIATRY & NEUROLOGY

## 2017-12-05 PROCEDURE — G8484 FLU IMMUNIZE NO ADMIN: HCPCS | Performed by: PSYCHIATRY & NEUROLOGY

## 2017-12-05 PROCEDURE — 99205 OFFICE O/P NEW HI 60 MIN: CPT | Performed by: PSYCHIATRY & NEUROLOGY

## 2017-12-05 PROCEDURE — G8427 DOCREV CUR MEDS BY ELIG CLIN: HCPCS | Performed by: PSYCHIATRY & NEUROLOGY

## 2017-12-05 PROCEDURE — 4040F PNEUMOC VAC/ADMIN/RCVD: CPT | Performed by: PSYCHIATRY & NEUROLOGY

## 2017-12-05 NOTE — PROGRESS NOTES
marked  Neurological:[] Visual Disturbance [] Double Vision [] Slurred Speech [] Trouble swallowing  [] Vertigo [] Tingling [] Numbness [] Weakness [] Loss of Balance [] Loss of Consciousness [] Memory Loss [] Tremor [] Seizure [] Syncope  [] Ataxia  [x] Denies all unless marked  Psychiatric/Behavioral:[] Depression [] Anxiety [] Confusion [] Agitation [] Behavior Problems  [] Hallucinations  [] Suicidal idiation  [x] Denies all unless marked  Sleep: []  Insomnia [] Sleep Disturbance [] Snoring [] Restless Legs [] Daytime Sleeping [] Sleep Apnea  [x] Denies all unless marked  Hematological:[] Adenopathy [] Bruises/Bleeds Easily  [x] Denies all unless marked  Endocrine: [] Cold Intolerance [] Heat Intolerance [] Polydipsia [] Polyphagia [] Polyuria  [x]Denies all unless marked  Allergic/Immunologic:[] Environmental Allergies [] Food Allergies [] Immunocompromised state  [x] Denies all unless marked    PHYSICAL EXAM  /74   Pulse 69   Ht 5' 11\" (1.803 m)   Wt 175 lb (79.4 kg)   SpO2 95%   BMI 24.41 kg/m²     Constitutional - No acute distress    HEENT- Conjunctiva normal.  No scars, masses, or lesions over external nose or ears  Musculoskeletal - No significant wasting of muscles noted, no bony deformities  Extremities - No clubbing, cyanosis or edema  Skin - Warm, dry, and intact. No rash, erythema, or pallor  Psychiatric - Mood, affect, and behavior appear normal      NEUROLOGICAL EXAM    Mental status   [x]Awake, alert, oriented   [x]Affect attention and concentration appear appropriate  [x]Recent and remote memory appears unremarkable  []Speech normal without dysarthria or aphasia, comprehension and repetition intact.    COMMENTS:Speech slurred, mild cognitive loss noted    Cranial Nerves [x]No VF deficit to confrontation,  no papilledema on fundoscopic exam.  [x]PERRLA, EOMI, no nystagmus, conjugate eye movements, no ptosis  [x]Face symmetric  [x]Facial sensation intact  [x]Tongue midline no atrophy or movement disorder not excluded, CJD in the differential, as is [place], HD felt less likely. PLAN:  1. MRI Brain with contrast  2. Additional metabolic and inflammatory lab screenings, check heavy metals, infectious labs, ceruloplasmin and copper. 3.  MRI T-spine  4. NCS/EMG x 4 EXT  5. EEG  6. Consider LP or paraneoplastic panel pending above.       Henry Miles DO  Board Certified Neurology

## 2017-12-07 LAB
CERULOPLASMIN: 26 MG/DL (ref 17–54)
RPR: NORMAL

## 2017-12-08 LAB
ARSENIC BLOOD: <10 UG/L (ref 0–13)
COPPER: 108 UG/DL (ref 70–140)
LEAD LEVEL BLOOD: <2 UG/DL (ref 0–4.9)
LYME (B. BURGDORFERI) AB IGG WB: NEGATIVE
LYME AB IGM BY WB:: NEGATIVE
MERCURY BLOOD: <3 UG/L (ref 0–10)

## 2017-12-09 LAB
ALBUMIN SERPL-MCNC: 4.33 G/DL (ref 3.75–5.01)
ALPHA-1-GLOBULIN: 0.36 G/DL (ref 0.19–0.46)
ALPHA-2-GLOBULIN: 0.84 G/DL (ref 0.48–1.05)
BETA GLOBULIN: 0.93 G/DL (ref 0.48–1.1)
GAMMA GLOBULIN: 1.14 G/DL (ref 0.62–1.51)
PROTEIN ELECTROPHORESIS, SERUM: NORMAL
SPE/IFE INTERPRETATION: NORMAL
TOTAL PROTEIN: 7.6 G/DL (ref 6–8.3)
VITAMIN B1 WHOLE BLOOD: 90 NMOL/L (ref 70–180)

## 2017-12-11 ENCOUNTER — TELEPHONE (OUTPATIENT)
Dept: FAMILY MEDICINE CLINIC | Age: 77
End: 2017-12-11

## 2017-12-11 ENCOUNTER — TELEPHONE (OUTPATIENT)
Dept: NEUROLOGY | Age: 77
End: 2017-12-11

## 2017-12-11 DIAGNOSIS — M54.50 CHRONIC BILATERAL LOW BACK PAIN WITHOUT SCIATICA: ICD-10-CM

## 2017-12-11 DIAGNOSIS — G89.29 CHRONIC BILATERAL LOW BACK PAIN WITHOUT SCIATICA: ICD-10-CM

## 2017-12-11 RX ORDER — HYDROCODONE BITARTRATE AND ACETAMINOPHEN 10; 325 MG/1; MG/1
1 TABLET ORAL EVERY 6 HOURS PRN
Qty: 20 TABLET | Refills: 0 | Status: CANCELLED | OUTPATIENT
Start: 2017-12-11 | End: 2017-12-18

## 2017-12-11 RX ORDER — ONDANSETRON 4 MG/1
4 TABLET, FILM COATED ORAL EVERY 8 HOURS PRN
Qty: 20 TABLET | Refills: 0 | Status: CANCELLED | OUTPATIENT
Start: 2017-12-11

## 2017-12-11 NOTE — TELEPHONE ENCOUNTER
I can't fax the pain medication without a visit for an assessment. If he is needing escalating doses of pain medication he will need visits to discuss this.

## 2017-12-11 NOTE — TELEPHONE ENCOUNTER
Called patient back wanted to have Dr Marcel Robledo prescribe him pain meds.  He is seeing is PCP tomorrow

## 2017-12-12 ENCOUNTER — OFFICE VISIT (OUTPATIENT)
Dept: FAMILY MEDICINE CLINIC | Age: 77
End: 2017-12-12
Payer: MEDICARE

## 2017-12-12 ENCOUNTER — TELEPHONE (OUTPATIENT)
Dept: NEUROSURGERY | Age: 77
End: 2017-12-12

## 2017-12-12 VITALS
DIASTOLIC BLOOD PRESSURE: 112 MMHG | HEART RATE: 74 BPM | TEMPERATURE: 97.6 F | OXYGEN SATURATION: 93 % | SYSTOLIC BLOOD PRESSURE: 168 MMHG | RESPIRATION RATE: 18 BRPM

## 2017-12-12 DIAGNOSIS — G89.29 CHRONIC BILATERAL LOW BACK PAIN WITHOUT SCIATICA: Primary | ICD-10-CM

## 2017-12-12 DIAGNOSIS — M54.50 CHRONIC BILATERAL LOW BACK PAIN WITHOUT SCIATICA: Primary | ICD-10-CM

## 2017-12-12 DIAGNOSIS — H61.22 IMPACTED CERUMEN OF LEFT EAR: ICD-10-CM

## 2017-12-12 DIAGNOSIS — M54.16 LUMBAR RADICULOPATHY: ICD-10-CM

## 2017-12-12 LAB
AMPHETAMINE SCREEN, URINE: NEGATIVE
BARBITURATE SCREEN, URINE: NEGATIVE
BENZODIAZEPINE SCREEN, URINE: NEGATIVE
COCAINE METABOLITE SCREEN URINE: NEGATIVE
MDMA URINE: NEGATIVE
METHADONE SCREEN, URINE: NEGATIVE
METHAMPHETAMINE, URINE: NEGATIVE
OPIATE SCREEN URINE: POSITIVE
OXYCODONE SCREEN URINE: NEGATIVE
PHENCYCLIDINE SCREEN URINE: NEGATIVE
PROPOXYPHENE SCREEN, URINE: NEGATIVE
THC: NEGATIVE
TRICYCLIC ANTIDEPRESSANTS, UR: NEGATIVE

## 2017-12-12 PROCEDURE — G8420 CALC BMI NORM PARAMETERS: HCPCS | Performed by: FAMILY MEDICINE

## 2017-12-12 PROCEDURE — 1036F TOBACCO NON-USER: CPT | Performed by: FAMILY MEDICINE

## 2017-12-12 PROCEDURE — 4040F PNEUMOC VAC/ADMIN/RCVD: CPT | Performed by: FAMILY MEDICINE

## 2017-12-12 PROCEDURE — 99214 OFFICE O/P EST MOD 30 MIN: CPT | Performed by: FAMILY MEDICINE

## 2017-12-12 PROCEDURE — 80305 DRUG TEST PRSMV DIR OPT OBS: CPT | Performed by: FAMILY MEDICINE

## 2017-12-12 PROCEDURE — G8484 FLU IMMUNIZE NO ADMIN: HCPCS | Performed by: FAMILY MEDICINE

## 2017-12-12 PROCEDURE — 1123F ACP DISCUSS/DSCN MKR DOCD: CPT | Performed by: FAMILY MEDICINE

## 2017-12-12 PROCEDURE — 3017F COLORECTAL CA SCREEN DOC REV: CPT | Performed by: FAMILY MEDICINE

## 2017-12-12 PROCEDURE — G8427 DOCREV CUR MEDS BY ELIG CLIN: HCPCS | Performed by: FAMILY MEDICINE

## 2017-12-12 RX ORDER — HYDROCODONE BITARTRATE AND ACETAMINOPHEN 10; 325 MG/1; MG/1
1 TABLET ORAL EVERY 6 HOURS PRN
Qty: 90 TABLET | Refills: 0 | Status: SHIPPED | OUTPATIENT
Start: 2017-12-12 | End: 2017-12-19

## 2017-12-12 RX ORDER — GABAPENTIN 300 MG/1
300 CAPSULE ORAL 3 TIMES DAILY
Qty: 90 CAPSULE | Refills: 3 | Status: SHIPPED | OUTPATIENT
Start: 2017-12-12 | End: 2018-01-10 | Stop reason: SDUPTHER

## 2017-12-12 RX ORDER — ONDANSETRON 4 MG/1
4 TABLET, FILM COATED ORAL EVERY 8 HOURS PRN
Qty: 20 TABLET | Refills: 0 | Status: SHIPPED | OUTPATIENT
Start: 2017-12-12 | End: 2018-01-08 | Stop reason: ALTCHOICE

## 2017-12-12 NOTE — TELEPHONE ENCOUNTER
----- Message from Eneida Guadalupe DO sent at 12/5/2017  2:01 PM CST -----  Mild thyroid abnormalities, non-urgent follow up with primary. RF elevated await other labs, will discuss at follow up.

## 2017-12-12 NOTE — PROGRESS NOTES
Chip  MEDICINE  [facility]  [address] [facility] [facility] [address]  Dept: [phone]  Dept Fax: [phone]  Loc: [phone]    Garnett Lesches is a 78 y.o. male who presents today for his medical conditions/complaints as noted below. Garnett Lesches is here for Cough (productive) and Discuss Medications (pain meds)        HPI:   CC: Here today to discuss the following:    Patient is here today to address his continual neck and lower back pain. He states the pain is progressively worsening. He comes in in a wheelchair today. He is accompanied by his sister who states that his condition continues to deteriorate. He states the pain is often severe sharp and stabbing. Primarily in the lower back and hips. He also has burning in the bilateral thighs. He states his balance is affected by this pain. He has difficulty sleeping at night. He states the 5 mg of hydrocodone was not controlling his pain nor lasting long enough. He has taken over 150 tablets in the last 30 days. Prior to that, he had not been on pain medication nor stated he was in this type of pain.  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    Back pain     Sciatica       Past Surgical History:   Procedure Laterality Date    CHOLECYSTECTOMY      EYE SURGERY      plates and screw around left eye    FOOT SURGERY         Family History   Problem Relation Age of Onset    Heart Disease Mother     Heart Disease Father        Social History   Substance Use Topics    Smoking status: Former Smoker     Types: Cigarettes    Smokeless tobacco: Never Used      Comment: quit smoking in 85     Alcohol use No      Current Outpatient Prescriptions   Medication Sig Dispense Refill    ondansetron (ZOFRAN) 4 MG tablet Take 1 tablet by mouth every 8 hours as needed for Nausea or Vomiting 20 normal. No respiratory distress. He has no wheezes. He has no rales. Abdominal: Soft. Bowel sounds are normal. He exhibits no distension. There is no tenderness. There is no rebound and no guarding. Musculoskeletal: He exhibits no edema. Neurological: He is alert. Psychiatric: He has a normal mood and affect.  His behavior is normal.         Recent Results (from the past 672 hour(s))   EKG 12 Lead    Collection Time: 11/19/17 10:42 AM   Result Value Ref Range    P-R Interval 200 ms    QRS Duration 110 ms    Q-T Interval 392 ms    QTc Calculation (Bazett) 413 ms    P Axis 38 degrees    T Axis 7 degrees   Comprehensive Metabolic Panel    Collection Time: 11/19/17 11:00 AM   Result Value Ref Range    Sodium 137 136 - 145 mmol/L    Potassium 3.9 3.5 - 5.0 mmol/L    Chloride 102 98 - 111 mmol/L    CO2 25 22 - 29 mmol/L    Anion Gap 10 7 - 19 mmol/L    Glucose 118 (H) 74 - 109 mg/dL    BUN 12 8 - 23 mg/dL    CREATININE 1.1 0.5 - 1.2 mg/dL    GFR Non-African American >60 >60    Calcium 9.0 8.8 - 10.2 mg/dL    Total Protein 7.0 6.6 - 8.7 g/dL    Alb 4.0 3.5 - 5.2 g/dL    Total Bilirubin 0.6 0.2 - 1.2 mg/dL    Alkaline Phosphatase 57 40 - 130 U/L    ALT 14 5 - 41 U/L    AST 13 5 - 40 U/L   CBC Auto Differential    Collection Time: 11/19/17 11:00 AM   Result Value Ref Range    WBC 9.7 4.8 - 10.8 K/uL    RBC 4.76 4.70 - 6.10 M/uL    Hemoglobin 14.2 14.0 - 18.0 g/dL    Hematocrit 41.4 (L) 42.0 - 52.0 %    MCV 87.0 80.0 - 94.0 fL    MCH 29.8 27.0 - 31.0 pg    MCHC 34.3 33.0 - 37.0 g/dL    RDW 13.6 11.5 - 14.5 %    Platelets 549 (L) 043 - 400 K/uL    MPV 10.0 9.4 - 12.4 fL    PLATELET SLIDE REVIEW Decreased     Path Consult Yes     Neutrophils % 50.0 50.0 - 65.0 %    Lymphocytes % 11.0 (L) 20.0 - 40.0 %    Monocytes % 31.0 (H) 0.0 - 10.0 %    Eosinophils % 0.0 0.0 - 5.0 %    Basophils % 0.0 0.0 - 1.0 %    Neutrophils # 5.1 1.5 - 7.5 K/uL    Lymphocytes # 1.6 1.1 - 4.5 K/uL    Monocytes # 3.00 (H) 0.00 - 0.90 K/uL Eosinophils # 0.00 0.00 - 0.60 K/uL    Basophils # 0.00 0.00 - 0.20 K/uL    Neutrophils Manual 50 %    Bands Relative 3 0 - 5 %    Atypical Lymphocytes Relative 5 0 - 8 %    Basophils Manual 0 %    Poikilocytes 1+ (A)     Acanthocytes Occasional (A)     Ovalocytes Occasional (A)    Protime-INR    Collection Time: 11/19/17 11:00 AM   Result Value Ref Range    Protime 14.1 12.0 - 14.6 sec    INR 1.10 0.88 - 1.18   CK    Collection Time: 11/19/17 11:00 AM   Result Value Ref Range    Total CK 59 39 - 308 U/L   Urinalysis    Collection Time: 11/19/17 11:49 AM   Result Value Ref Range    Color, UA YELLOW Straw/Yellow    Clarity, UA Clear Clear    Glucose, Ur Negative Negative mg/dL    Bilirubin Urine Negative Negative    Ketones, Urine Negative Negative mg/dL    Specific Gravity, UA 1.014 1.005 - 1.030    Blood, Urine Negative Negative    pH, UA 6.0 5.0 - 8.0    Protein, UA Negative Negative mg/dL    Urobilinogen, Urine 1.0 <2.0 E.U./dL    Nitrite, Urine Negative Negative    Leukocyte Esterase, Urine Negative Negative   EKG 12 Lead    Collection Time: 12/03/17  5:53 AM   Result Value Ref Range    P-R Interval 196 ms    QRS Duration 120 ms    Q-T Interval 428 ms    QTc Calculation (Bazett) 428 ms    P Axis 52 degrees    T Axis 23 degrees   Amylase    Collection Time: 12/03/17  6:00 AM   Result Value Ref Range    Amylase 71 28 - 100 U/L   CBC Auto Differential    Collection Time: 12/03/17  6:00 AM   Result Value Ref Range    WBC 4.6 (L) 4.8 - 10.8 K/uL    RBC 4.65 (L) 4.70 - 6.10 M/uL    Hemoglobin 13.7 (L) 14.0 - 18.0 g/dL    Hematocrit 40.4 (L) 42.0 - 52.0 %    MCV 86.9 80.0 - 94.0 fL    MCH 29.5 27.0 - 31.0 pg    MCHC 33.9 33.0 - 37.0 g/dL    RDW 13.5 11.5 - 14.5 %    Platelets 935 686 - 182 K/uL    MPV 9.6 9.4 - 12.4 fL    Neutrophils % 37.1 (L) 50.0 - 65.0 %    Lymphocytes % 23.2 20.0 - 40.0 %    Monocytes % 39.5 (H) 0.0 - 10.0 %    Eosinophils % 0.0 0.0 - 5.0 %    Basophils % 0.0 0.0 - 1.0 %    Neutrophils # 1.7 1.5 - Non-reactive Non-reactive   Rheumatoid Factor    Collection Time: 12/05/17 11:59 AM   Result Value Ref Range    Rheumatoid Factor 18.0 (H) <14 IU/mL   Electrophoresis Protein, Serum without Reflex to Immunofixation    Collection Time: 12/05/17 11:59 AM   Result Value Ref Range    Total Protein 7.60 6.00 - 8.30 g/dL    Alb 4.33 3.75 - 5.01 g/dL    Alpha-1-Globulin 0.36 0.19 - 0.46 g/dL    Alpha-2-Globulin 0.84 0.48 - 1.05 g/dL    Beta Globulin 0.93 0.48 - 1.10 g/dL    Gamma Globulin 1.14 0.62 - 1.51 g/dL    SPE/GERONIMO Interpretation See Note     Protein Electrophoresis, Serum See Note    Copper, Serum    Collection Time: 12/05/17 11:59 AM   Result Value Ref Range    Copper 108 70 - 140 ug/dL   Ceruloplasmin    Collection Time: 12/05/17 11:59 AM   Result Value Ref Range    Ceruloplasmin 26 17 - 54 mg/dL   Vitamin B1, Whole Blood    Collection Time: 12/05/17 11:59 AM   Result Value Ref Range    Vitamin B1,Whole Blood 90 70 - 180 nmol/L   CK    Collection Time: 12/05/17 11:59 AM   Result Value Ref Range    Total CK 85 39 - 308 U/L   B. Burgdorferi Antibodies by WB    Collection Time: 12/05/17 11:59 AM   Result Value Ref Range    Lyme (B. burgdorferi) Ab IgG WB Negative Negative    Lyme Ab IgM by WB: Negative Negative   POCT Rapid Drug Screen    Collection Time: 12/12/17  3:30 PM   Result Value Ref Range    Amphetamine Screen, Urine negative     Barbiturate Screen, Urine negative     Benzodiazepine Screen, Urine negative     COCAINE METABOLITE SCREEN URINE negative     THC negative     MDMA URINE negative     Methadone Screen, Urine negative     Opiate Scrn, Ur positive     Oxycodone Screen, Ur negative     PCP Scrn, Ur negative     Propoxyphene Screen, Urine negative     Tricyclic Antidepressants, Ur negative     Methamphetamine, Urine negative      Result Impression   Cervical spine. Multilevel degenerative changes, worse at C4-C5.  Please see above for  level by level findings  This report was finalized on 11/21/2017 09:33 by Dr. Benito Estrada MD.   Result Narrative       Impression:  No acute infarction, hemorrhage, or mass. This report was finalized on 11/02/2017 08:30 by  Vince Mckenzie, . Result Narrative     History:  53-year-old evaluated for stroke. Right-sided leg weakness. Reference   CT head 1 day previous. MRI brain January 2017. Technique  Routine unenhanced brain MRI. Findings  There is no area of restricted diffusion to suggest acute infarction. No  extra-axial fluid collection. No chronic cortical infarction is  observed. There is generalized atrophy with commensurate ventricular  enlargement. No convincing hydrocephalus. Mild microangiopathy of the  periventricular white matter. The major vascular flow voids are maintained. Mucosal thickening left  maxillary sinus. Degenerative changes of the cervical spine which appear most  pronounced at C3-C4. A right lateral posterior osteophyte at this  location results in mild to moderate thecal sac stenosis as well as  severe right neuroforaminal narrowing. Neuroforaminal narrowing is also  noted at multiple additional levels of the cervical spine. This report was finalized on 11/02/2017 08:45 by Dr. Tala Sparks MD.   Result Narrative   EXAMINATION: MRI CERVICAL SPINE WO CONTRAST- 11/2/2017 9:35 AM EDT     MRI   1. Generalized volume loss. 2. No focal atrophy of the caudate nuclei. Electronically Signed By-Dr. Juan José Resendiz MD On:1/16/2017 2:44 PM  EST  This report was finalized on 01/16/2017 13:44 by Dr. Lakshmi Boswell MD.        Rupali Farias: The following diagnoses and conditions are stable with no further orders unless indicated:  1. Chronic bilateral low back pain without sciatica    Discussed risks of tolerance and addiction associated with escalating doses of hydrocodone. He understands this potential risk for states pain is often times unbearable. He has been evaluated by orthopedic surgeon, Dr. Jonatan Riddle.  Dr. Cameron Lyon as directed.

## 2017-12-13 ASSESSMENT — ENCOUNTER SYMPTOMS
ANAL BLEEDING: 0
ABDOMINAL PAIN: 0
DIARRHEA: 0
BACK PAIN: 1
NAUSEA: 0
SHORTNESS OF BREATH: 0
CONSTIPATION: 1
COUGH: 0
CHEST TIGHTNESS: 0

## 2017-12-19 ENCOUNTER — TELEPHONE (OUTPATIENT)
Dept: NEUROSURGERY | Age: 77
End: 2017-12-19

## 2017-12-21 ENCOUNTER — HOSPITAL ENCOUNTER (OUTPATIENT)
Dept: MRI IMAGING | Age: 77
Discharge: HOME | End: 2017-12-21
Payer: MEDICARE

## 2017-12-21 ENCOUNTER — HOSPITAL ENCOUNTER (OUTPATIENT)
Dept: NEUROLOGY | Age: 77
Discharge: HOME | End: 2017-12-21
Payer: MEDICARE

## 2017-12-21 DIAGNOSIS — R47.9 SPEECH DISTURBANCE, UNSPECIFIED TYPE: ICD-10-CM

## 2017-12-21 DIAGNOSIS — R25.1 TREMOR: ICD-10-CM

## 2017-12-21 DIAGNOSIS — R26.9 GAIT DIFFICULTY: ICD-10-CM

## 2017-12-21 DIAGNOSIS — R20.0 NUMBNESS: ICD-10-CM

## 2017-12-21 PROCEDURE — 95913 NRV CNDJ TEST 13/> STUDIES: CPT | Performed by: PSYCHIATRY & NEUROLOGY

## 2017-12-21 PROCEDURE — 6360000004 HC RX CONTRAST MEDICATION: Performed by: PSYCHIATRY & NEUROLOGY

## 2017-12-21 PROCEDURE — A9577 INJ MULTIHANCE: HCPCS | Performed by: PSYCHIATRY & NEUROLOGY

## 2017-12-21 PROCEDURE — 72146 MRI CHEST SPINE W/O DYE: CPT

## 2017-12-21 PROCEDURE — 95886 MUSC TEST DONE W/N TEST COMP: CPT

## 2017-12-21 PROCEDURE — 95913 NRV CNDJ TEST 13/> STUDIES: CPT

## 2017-12-21 PROCEDURE — 95886 MUSC TEST DONE W/N TEST COMP: CPT | Performed by: PSYCHIATRY & NEUROLOGY

## 2017-12-21 PROCEDURE — 70553 MRI BRAIN STEM W/O & W/DYE: CPT

## 2017-12-21 PROCEDURE — 95813 EEG EXTND MNTR 61-119 MIN: CPT

## 2017-12-21 PROCEDURE — 95813 EEG EXTND MNTR 61-119 MIN: CPT | Performed by: PSYCHIATRY & NEUROLOGY

## 2017-12-21 RX ADMIN — GADOBENATE DIMEGLUMINE 17 ML: 529 INJECTION, SOLUTION INTRAVENOUS at 15:41

## 2017-12-21 NOTE — PROCEDURES
ADULT OUTPATIENT ELECTROENCEPHALOGRAM REPORT    Patient:   Kimberly Best  MR#:    [id number]  YOB: 1940  Date of Evaluation:  12/21/2017  Primary Physician:     Daisy Hernandez MD   Referring Physician:   Razia Fraga DO      CLINICAL INFORMATION:     This patient is a 78 y.o. male with a history of atypical movements, choreiform movements. MEDICATIONS:     See MAR. RECORDING CONDITIONS:     This EEG was performed utilizing standard International 10-20 System of electrode placement, with additional channels monitored for eye movement. One channel electrocardiogram was monitored. Data was obtained, stored, and interpreted according to ACNS guidelines (J Clin Neurophysiol 2006;23(2):) utilizing referential montage recording, with reformatting to longitudinal, transverse bipolar, and referential montages as necessary for interpretation, along with the digital/automated EEG analysis. Patient tolerated entire procedure well. Photic stimulation and hyperventilation were utilized as activation procedures unless otherwise specified below. E.E.G. DESCRIPTION:     The resting predominant posterior background frequency is a 9-10 Hz 30-40 uV rhythm. No overt focal, lateralizing, or paroxysmal abnormalities were noted through the recording. Drowsiness was demonstrated by slow rolling eye movements followed by a loss of the background waking activities. Hyperventilation was not performed. Photic stimulation was preformed at frequencies between 1 and 30 Hz demonstrating symmetric bioccipital driving responses over a range of frequencies. No ECG abnormalities were noted. Muscle, motion, and eye movement artifacts were noted. Recording time was 61 minutes. EEG INTERPRETATION:    Normal EEG for age in the awake and drowsy states. CLINICAL CORRELATION:     The absence of epileptiform abnormalities does not preclude a clinical diagnosis of seizures.        Razia Fraga DO  Board Certified Neurologist    Date reported: 12/21/2017  Date signed: 12/21/2017

## 2018-01-02 RX ORDER — HYDROCODONE BITARTRATE AND ACETAMINOPHEN 10; 325 MG/1; MG/1
1 TABLET ORAL EVERY 6 HOURS PRN
Qty: 40 TABLET | Refills: 0 | Status: SHIPPED | OUTPATIENT
Start: 2018-01-02 | End: 2018-01-05 | Stop reason: SDUPTHER

## 2018-01-02 NOTE — TELEPHONE ENCOUNTER
Requested Prescriptions     Pending Prescriptions Disp Refills    HYDROcodone-acetaminophen (NORCO)  MG per tablet 40 tablet 0     Sig: Take 1 tablet by mouth every 6 hours as needed for Pain for up to 11 days. [facility]    Patient states he has been taking norco 4 times per day and he only has enough for 1 more day. He is requesting a refill just enough to get him to his appt with Dr. Junaid Ennis. He sates he figured it should be 40 pills. I advised that would have to send Dr. Mitchell Santos a  Request. Pt understood.

## 2018-01-05 ENCOUNTER — OFFICE VISIT (OUTPATIENT)
Dept: FAMILY MEDICINE CLINIC | Age: 78
End: 2018-01-05
Payer: MEDICARE

## 2018-01-05 VITALS
RESPIRATION RATE: 18 BRPM | HEART RATE: 76 BPM | OXYGEN SATURATION: 93 % | TEMPERATURE: 97.5 F | DIASTOLIC BLOOD PRESSURE: 82 MMHG | SYSTOLIC BLOOD PRESSURE: 126 MMHG

## 2018-01-05 DIAGNOSIS — G70.9 NEUROMUSCULAR DISORDER (HCC): Primary | ICD-10-CM

## 2018-01-05 DIAGNOSIS — M51.36 LUMBAR DEGENERATIVE DISC DISEASE: ICD-10-CM

## 2018-01-05 PROCEDURE — G8427 DOCREV CUR MEDS BY ELIG CLIN: HCPCS | Performed by: FAMILY MEDICINE

## 2018-01-05 PROCEDURE — 99213 OFFICE O/P EST LOW 20 MIN: CPT | Performed by: FAMILY MEDICINE

## 2018-01-05 PROCEDURE — 1123F ACP DISCUSS/DSCN MKR DOCD: CPT | Performed by: FAMILY MEDICINE

## 2018-01-05 PROCEDURE — 4040F PNEUMOC VAC/ADMIN/RCVD: CPT | Performed by: FAMILY MEDICINE

## 2018-01-05 PROCEDURE — 1036F TOBACCO NON-USER: CPT | Performed by: FAMILY MEDICINE

## 2018-01-05 PROCEDURE — 3017F COLORECTAL CA SCREEN DOC REV: CPT | Performed by: FAMILY MEDICINE

## 2018-01-05 PROCEDURE — G8484 FLU IMMUNIZE NO ADMIN: HCPCS | Performed by: FAMILY MEDICINE

## 2018-01-05 PROCEDURE — G8420 CALC BMI NORM PARAMETERS: HCPCS | Performed by: FAMILY MEDICINE

## 2018-01-05 RX ORDER — HYDROCODONE BITARTRATE AND ACETAMINOPHEN 10; 325 MG/1; MG/1
1 TABLET ORAL EVERY 6 HOURS PRN
Qty: 120 TABLET | Refills: 0
Start: 2018-01-05 | End: 2018-01-10 | Stop reason: SDUPTHER

## 2018-01-05 NOTE — PROGRESS NOTES
Chip  MEDICINE  [facility] [facility]  [address] [facility] [address]  Dept: [phone]  Dept Fax: [phone]  Loc: [phone]    Manjeet Pires is a 78 y.o. male who presents today for his medical conditions/complaints as noted below. Manjeet Pires is here for 1 Month Follow-Up        HPI:   CC: Here today to discuss the following:    Feels his lower back pain is better controlled with the hydrocodone. He is currently taking it every 6 hours. He's having no lethargy somnolence or constipation. He is also taking the gabapentin without adverse effects. His sister accompanies him today and states he does appear to be doing somewhat better since his last visit. 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    Back pain     Sciatica       Past Surgical History:   Procedure Laterality Date    CHOLECYSTECTOMY      EYE SURGERY      plates and screw around left eye    FOOT SURGERY         Family History   Problem Relation Age of Onset    Heart Disease Mother     Heart Disease Father        Social History   Substance Use Topics    Smoking status: Former Smoker     Types: Cigarettes    Smokeless tobacco: Never Used      Comment: quit smoking in 85     Alcohol use No      Current Outpatient Prescriptions   Medication Sig Dispense Refill    HYDROcodone-acetaminophen (NORCO)  MG per tablet Take 1 tablet by mouth every 6 hours as needed for Pain for up to 30 days. 120 tablet 0    gabapentin (NEURONTIN) 300 MG capsule Take 1 capsule by mouth 3 times daily 90 capsule 3     No current facility-administered medications for this visit.       No Known Allergies    Health Maintenance   Topic Date Due    DTaP/Tdap/Td vaccine (1 - Tdap) 09/23/1961    Lipid screen  09/23/1982    Colon cancer screen colonoscopy  09/23/1992    Zostavax vaccine  09/23/2002    Pneumococcal low/med risk (1 of 2 - PCV13) 09/23/2007    Flu vaccine (1) 10/01/2018 (Originally 9/1/2017)    AAA screen  Completed       Subjective:      Review of Systems   Constitutional: Negative for chills and fever. HENT: Negative for congestion. Respiratory: Negative for cough, chest tightness and shortness of breath. Cardiovascular: Negative for chest pain, palpitations and leg swelling. Gastrointestinal: Negative for abdominal pain, anal bleeding, constipation, diarrhea and nausea. Genitourinary: Negative for difficulty urinating. Musculoskeletal: Positive for arthralgias, back pain, gait problem, myalgias and neck pain. Neurological: Positive for tremors and weakness. Psychiatric/Behavioral: Negative. See HPI for visit specific review of symptoms. All others negative      Objective:   /82   Pulse 76   Temp 97.5 °F (36.4 °C)   Resp 18   SpO2 93%   Physical Exam   Constitutional: He appears well-developed. He does not appear ill. Eyes: Pupils are equal, round, and reactive to light. Neck: Normal range of motion. Neck supple. Cardiovascular: Normal rate and regular rhythm. Exam reveals no friction rub. No murmur heard. Pulmonary/Chest: Effort normal and breath sounds normal. No respiratory distress. He has no wheezes. He has no rales. Abdominal: Soft. Bowel sounds are normal. He exhibits no distension. There is no tenderness. There is no rebound and no guarding. Musculoskeletal: He exhibits no edema. Neurological: He is alert. Psychiatric: He has a normal mood and affect. His behavior is normal.         No results found for this or any previous visit (from the past 672 hour(s)). Assessment & Plan: The following diagnoses and conditions are stable with no further orders unless indicated:  1. Neuromuscular disorder ([facility] [address]  No clear etiology to his neuromuscular issues. We'll await further input from neurology.     2. Lumbar degenerative

## 2018-01-08 ENCOUNTER — OFFICE VISIT (OUTPATIENT)
Dept: NEUROSURGERY | Age: 78
End: 2018-01-08
Payer: MEDICARE

## 2018-01-08 VITALS
WEIGHT: 175 LBS | SYSTOLIC BLOOD PRESSURE: 118 MMHG | HEIGHT: 71 IN | BODY MASS INDEX: 24.5 KG/M2 | DIASTOLIC BLOOD PRESSURE: 60 MMHG | HEART RATE: 91 BPM | OXYGEN SATURATION: 93 %

## 2018-01-08 DIAGNOSIS — R47.9 SPEECH DISTURBANCE, UNSPECIFIED TYPE: ICD-10-CM

## 2018-01-08 DIAGNOSIS — R25.1 TREMOR: Primary | ICD-10-CM

## 2018-01-08 DIAGNOSIS — R26.9 GAIT DIFFICULTY: ICD-10-CM

## 2018-01-08 DIAGNOSIS — R20.0 NUMBNESS: ICD-10-CM

## 2018-01-08 PROCEDURE — 1036F TOBACCO NON-USER: CPT | Performed by: PSYCHIATRY & NEUROLOGY

## 2018-01-08 PROCEDURE — 4040F PNEUMOC VAC/ADMIN/RCVD: CPT | Performed by: PSYCHIATRY & NEUROLOGY

## 2018-01-08 PROCEDURE — 99214 OFFICE O/P EST MOD 30 MIN: CPT | Performed by: PSYCHIATRY & NEUROLOGY

## 2018-01-08 PROCEDURE — G8484 FLU IMMUNIZE NO ADMIN: HCPCS | Performed by: PSYCHIATRY & NEUROLOGY

## 2018-01-08 PROCEDURE — G8427 DOCREV CUR MEDS BY ELIG CLIN: HCPCS | Performed by: PSYCHIATRY & NEUROLOGY

## 2018-01-08 PROCEDURE — 3017F COLORECTAL CA SCREEN DOC REV: CPT | Performed by: PSYCHIATRY & NEUROLOGY

## 2018-01-08 PROCEDURE — G8420 CALC BMI NORM PARAMETERS: HCPCS | Performed by: PSYCHIATRY & NEUROLOGY

## 2018-01-08 PROCEDURE — 1123F ACP DISCUSS/DSCN MKR DOCD: CPT | Performed by: PSYCHIATRY & NEUROLOGY

## 2018-01-08 NOTE — PROGRESS NOTES
Former Smoker     Types: Cigarettes    Smokeless tobacco: Never Used      Comment: quit smoking in 85     Alcohol use No    Drug use: No    Sexual activity: No     Other Topics Concern    Not on file     Social History Narrative    No narrative on file       Current Outpatient Prescriptions   Medication Sig Dispense Refill    HYDROcodone-acetaminophen (NORCO)  MG per tablet Take 1 tablet by mouth every 6 hours as needed for Pain for up to 30 days. 120 tablet 0    gabapentin (NEURONTIN) 300 MG capsule Take 1 capsule by mouth 3 times daily 90 capsule 3     No current facility-administered medications for this visit.       ALLERGIES  No Known Allergies      REVIEW OF SYSTEMS    Constitutional: []Fever []Sweats []Chills [] Recent Injury []Fatigue  [x] Denies all unless marked  HEENT:[]Headache  [] Head Injury  [] Sore Throat  [] Ear Pain  []Dizziness [] Hearing Loss []Trouble Swallowing []Voice Change  [] Eye Pain  [] Eye Injection []Visual Disturbance  [] Ptosis  [] Tinnitus [x] Denies all unless marked  Spine:  [] Neck pain  [] Back pain  [] Sciaticia  [x] Denies all unless marked  Cardiovascular:[]Chest Pain []Palpitations [] Heart Disease  [x] Denies all unless marked  Pulmonary: []Shortness of Breath []Cough  []Wheezing  [x] Denies all unless marked  Gastrointestinal:  []Abdominal Pain  []Blood in Stool  []Diarrhea []Constipation []Nausea  []Vomiting  [x] Denies all unless marked  Genitourinary:  [] Dysuria [] Enuresis [] Incontinence [] Frequency/Urgency  [] Hematuria  [x] Denies all unless marked  Musculoskeletal: [] Joint Pain [] Myalgias [] Joint Swelling [] Neck Stiffness  [x] Denies all unless marked  Skin:[] Rash [] Pallor [] Color Change [] Wound  [x] Denies all unless marked  Neurological:[] Visual Disturbance [] Double Vision [] Slurred Speech [] Trouble swallowing  [] Vertigo [] Tingling [] Numbness [] Weakness [] Loss of Balance [] Loss of Consciousness [] Memory Loss [] Tremor [] Seizure [] Syncope  [] Ataxia  [x] Denies all unless marked  Psychiatric/Behavioral:[] Depression [] Anxiety [] Confusion [] Agitation [] Behavior Problems  [] Hallucinations  [] Suicidal idiation  [x] Denies all unless marked  Sleep: []  Insomnia [] Sleep Disturbance [] Snoring [] Restless Legs [] Daytime Sleeping [] Sleep Apnea  [x] Denies all unless marked  Hematological:[] Adenopathy [] Bruises/Bleeds Easily  [x] Denies all unless marked  Endocrine: [] Cold Intolerance [] Heat Intolerance [] Polydipsia [] Polyphagia [] Polyuria  [x]Denies all unless marked  Allergic/Immunologic:[] Environmental Allergies [] Food Allergies [] Immunocompromised state  [x] Denies all unless marked    PHYSICAL EXAM  /60   Pulse 91   Ht 5' 11\" (1.803 m)   Wt 175 lb (79.4 kg)   SpO2 93%   BMI 24.41 kg/m²     Constitutional - No acute distress    HEENT- Conjunctiva normal.  No scars, masses, or lesions over external nose or ears  Musculoskeletal - No significant wasting of muscles noted, no bony deformities  Extremities - No clubbing, cyanosis or edema  Skin - Warm, dry, and intact. No rash, erythema, or pallor  Psychiatric - Mood, affect, and behavior appear normal      NEUROLOGICAL EXAM    Mental status   [x]Awake, alert, oriented   [x]Affect attention and concentration appear appropriate  [x]Recent and remote memory appears unremarkable  []Speech normal without dysarthria or aphasia, comprehension and repetition intact.    COMMENTS:Speech slurred, mild cognitive loss noted    Cranial Nerves [x]No VF deficit to confrontation,  no papilledema on fundoscopic exam.  [x]PERRLA, EOMI, no nystagmus, conjugate eye movements, no ptosis  [x]Face symmetric  [x]Facial sensation intact  [x]Tongue midline no atrophy or fasciculations present  [x]Palate midline, hearing to finger rub normal bilaterally  [x]Shoulder shrug and SCM testing normal bilaterally  COMMENTS:   Motor   []5/5 strength x 4 extremities  [x]Normal bulk and tone  [x]No tremor present  []No rigidity or bradykinesia noted  COMMENTS:4/5 globally, akathisia noted, questionable choreiform movements   Sensory  [x]Sensation intact to light touch, pin prick, vibration, and proprioception BLE  []Sensation intact to light touch, pin prick, vibration, and proprioception BUE  COMMENTS:   Coordination [x]FTN normal bilaterally   [x]HTS normal bilaterally  []TAMARA normal bilaterally. COMMENTS: TAMARA assymetric   Reflexes  [x]Symmetric and non-pathological  [x]Toes down going bilaterally  [x]No clonus present  COMMENTS:   Gait                  []Normal steady gait    []Ataxic    []Spastic     []Magnetic     []Shuffling  COMMENTS:  Unsteady       LABS RECORD AND IMAGING REVIEW (As below and per HPI)      Lab Results   Component Value Date    WBC 5.7 12/05/2017    HGB 13.8 (L) 12/05/2017    HCT 41.7 (L) 12/05/2017    MCV 89.1 12/05/2017     12/05/2017     Lab Results   Component Value Date     12/05/2017    K 4.2 12/05/2017    CL 98 12/05/2017    CO2 27 12/05/2017    BUN 10 12/05/2017    CREATININE 1.1 12/05/2017    GLUCOSE 105 12/05/2017    CALCIUM 9.6 12/05/2017    PROT 7.4 12/05/2017    PROT 7.60 12/05/2017    LABALBU 4.1 12/05/2017    LABALBU 4.33 12/05/2017    BILITOT 0.5 12/05/2017    ALKPHOS 60 12/05/2017    AST 17 12/05/2017    ALT 17 12/05/2017    LABGLOM >60 12/05/2017     Records and MRI reviewed. [facility] records reviewed. Extensive lab results reviewed, mostly negative except for TSH slightly elevated and rheumatoid factor slightly elevated. EMG showed an axonal polyneuropathy, slightly asymmetric. NCS/EMG, labs, EEG, MRI reviewed. ASSESSMENT:    Samir Norton is a 78y.o. year old male here for numbness, uncontrolled questionable choreiform movements, worsening neck and back pain, gait difficulty, speech difficulty.   Differential broad, workup largely negative thus far including MRI Brain, cervical and lumbar spine MRI, CD, ECHO, and extensive underlying metabolic,

## 2018-01-10 DIAGNOSIS — M54.50 CHRONIC BILATERAL LOW BACK PAIN WITHOUT SCIATICA: ICD-10-CM

## 2018-01-10 DIAGNOSIS — G89.29 CHRONIC BILATERAL LOW BACK PAIN WITHOUT SCIATICA: ICD-10-CM

## 2018-01-10 ASSESSMENT — ENCOUNTER SYMPTOMS
DIARRHEA: 0
NAUSEA: 0
ABDOMINAL PAIN: 0
COUGH: 0
BACK PAIN: 1
CONSTIPATION: 0
CHEST TIGHTNESS: 0
SHORTNESS OF BREATH: 0
ANAL BLEEDING: 0

## 2018-01-11 RX ORDER — GABAPENTIN 300 MG/1
300 CAPSULE ORAL 3 TIMES DAILY
Qty: 90 CAPSULE | Refills: 3 | Status: SHIPPED | OUTPATIENT
Start: 2018-01-11 | End: 2018-02-10

## 2018-01-11 RX ORDER — HYDROCODONE BITARTRATE AND ACETAMINOPHEN 10; 325 MG/1; MG/1
1 TABLET ORAL EVERY 6 HOURS PRN
Qty: 120 TABLET | Refills: 0 | Status: SHIPPED | OUTPATIENT
Start: 2018-01-11 | End: 2018-02-10

## 2018-01-17 ENCOUNTER — APPOINTMENT (OUTPATIENT)
Dept: CT IMAGING | Age: 78
DRG: 057 | End: 2018-01-17
Payer: MEDICARE

## 2018-01-17 ENCOUNTER — APPOINTMENT (OUTPATIENT)
Dept: GENERAL RADIOLOGY | Age: 78
DRG: 057 | End: 2018-01-17
Payer: MEDICARE

## 2018-01-17 ENCOUNTER — HOSPITAL ENCOUNTER (INPATIENT)
Age: 78
LOS: 2 days | Discharge: PSYCHIATRIC OR SUBSTANCE USE FACILITY | DRG: 057 | End: 2018-01-24
Attending: EMERGENCY MEDICINE | Admitting: HOSPITALIST
Payer: MEDICARE

## 2018-01-17 DIAGNOSIS — M54.5 CHRONIC LOW BACK PAIN, UNSPECIFIED BACK PAIN LATERALITY, WITH SCIATICA PRESENCE UNSPECIFIED: ICD-10-CM

## 2018-01-17 DIAGNOSIS — S01.81XA CHIN LACERATION, INITIAL ENCOUNTER: ICD-10-CM

## 2018-01-17 DIAGNOSIS — R55 SYNCOPE AND COLLAPSE: Primary | ICD-10-CM

## 2018-01-17 DIAGNOSIS — R25.1 TREMOR: ICD-10-CM

## 2018-01-17 DIAGNOSIS — R94.31 ABNORMAL EKG: ICD-10-CM

## 2018-01-17 DIAGNOSIS — G89.29 CHRONIC LOW BACK PAIN, UNSPECIFIED BACK PAIN LATERALITY, WITH SCIATICA PRESENCE UNSPECIFIED: ICD-10-CM

## 2018-01-17 DIAGNOSIS — R93.89 ABNORMAL CHEST CT: ICD-10-CM

## 2018-01-17 PROBLEM — R47.81 SLURRED SPEECH: Chronic | Status: ACTIVE | Noted: 2018-01-17

## 2018-01-17 PROBLEM — G25.9 MOVEMENT DISORDER: Chronic | Status: ACTIVE | Noted: 2018-01-17

## 2018-01-17 LAB
ALBUMIN SERPL-MCNC: 4.4 G/DL (ref 3.5–5.2)
ALP BLD-CCNC: 77 U/L (ref 40–130)
ALT SERPL-CCNC: 13 U/L (ref 5–41)
ANION GAP SERPL CALCULATED.3IONS-SCNC: 13 MMOL/L (ref 7–19)
AST SERPL-CCNC: 15 U/L (ref 5–40)
BASOPHILS ABSOLUTE: 0 K/UL (ref 0–0.2)
BASOPHILS MANUAL: 0 %
BASOPHILS RELATIVE PERCENT: 0 % (ref 0–1)
BILIRUB SERPL-MCNC: 0.6 MG/DL (ref 0.2–1.2)
BILIRUBIN URINE: NEGATIVE
BLOOD, URINE: NEGATIVE
BUN BLDV-MCNC: 11 MG/DL (ref 8–23)
CALCIUM SERPL-MCNC: 9.5 MG/DL (ref 8.8–10.2)
CHLORIDE BLD-SCNC: 101 MMOL/L (ref 98–111)
CLARITY: CLEAR
CO2: 26 MMOL/L (ref 22–29)
COLOR: YELLOW
CREAT SERPL-MCNC: 0.9 MG/DL (ref 0.5–1.2)
EOSINOPHILS ABSOLUTE: 0 K/UL (ref 0–0.6)
EOSINOPHILS RELATIVE PERCENT: 0 % (ref 0–5)
GFR NON-AFRICAN AMERICAN: >60
GLUCOSE BLD-MCNC: 125 MG/DL (ref 74–109)
GLUCOSE URINE: NEGATIVE MG/DL
HCT VFR BLD CALC: 40.4 % (ref 42–52)
HEMOGLOBIN: 13.3 G/DL (ref 14–18)
KETONES, URINE: NEGATIVE MG/DL
LACTIC ACID: 1.7 MMOL/L (ref 0.5–1.9)
LEUKOCYTE ESTERASE, URINE: NEGATIVE
LYMPHOCYTES ABSOLUTE: 1.3 K/UL (ref 1.1–4.5)
LYMPHOCYTES RELATIVE PERCENT: 17 % (ref 20–40)
MCH RBC QN AUTO: 29 PG (ref 27–31)
MCHC RBC AUTO-ENTMCNC: 32.9 G/DL (ref 33–37)
MCV RBC AUTO: 88 FL (ref 80–94)
MONOCYTES ABSOLUTE: 2.1 K/UL (ref 0–0.9)
MONOCYTES RELATIVE PERCENT: 28 % (ref 0–10)
NEUTROPHILS ABSOLUTE: 4.2 K/UL (ref 1.5–7.5)
NEUTROPHILS MANUAL: 55 %
NEUTROPHILS RELATIVE PERCENT: 55 % (ref 50–65)
NITRITE, URINE: NEGATIVE
OVALOCYTES: ABNORMAL
PDW BLD-RTO: 14 % (ref 11.5–14.5)
PH UA: 6.5
PLATELET # BLD: 104 K/UL (ref 130–400)
PLATELET SLIDE REVIEW: ABNORMAL
PMV BLD AUTO: 10.4 FL (ref 9.4–12.4)
POTASSIUM SERPL-SCNC: 3.8 MMOL/L (ref 3.5–5)
PROTEIN UA: NEGATIVE MG/DL
RBC # BLD: 4.59 M/UL (ref 4.7–6.1)
SODIUM BLD-SCNC: 140 MMOL/L (ref 136–145)
SPECIFIC GRAVITY UA: 1.03
TOTAL CK: 81 U/L (ref 39–308)
TOTAL PROTEIN: 7.5 G/DL (ref 6.6–8.7)
TROPONIN: <0.01 NG/ML (ref 0–0.03)
UROBILINOGEN, URINE: 1 E.U./DL
WBC # BLD: 7.6 K/UL (ref 4.8–10.8)

## 2018-01-17 PROCEDURE — 70486 CT MAXILLOFACIAL W/O DYE: CPT

## 2018-01-17 PROCEDURE — 99285 EMERGENCY DEPT VISIT HI MDM: CPT

## 2018-01-17 PROCEDURE — 6360000004 HC RX CONTRAST MEDICATION: Performed by: NURSE PRACTITIONER

## 2018-01-17 PROCEDURE — 71260 CT THORAX DX C+: CPT

## 2018-01-17 PROCEDURE — 90471 IMMUNIZATION ADMIN: CPT | Performed by: NURSE PRACTITIONER

## 2018-01-17 PROCEDURE — 2500000003 HC RX 250 WO HCPCS: Performed by: NURSE PRACTITIONER

## 2018-01-17 PROCEDURE — 0HQ1XZZ REPAIR FACE SKIN, EXTERNAL APPROACH: ICD-10-PCS | Performed by: EMERGENCY MEDICINE

## 2018-01-17 PROCEDURE — 70450 CT HEAD/BRAIN W/O DYE: CPT

## 2018-01-17 PROCEDURE — 74177 CT ABD & PELVIS W/CONTRAST: CPT

## 2018-01-17 PROCEDURE — 99285 EMERGENCY DEPT VISIT HI MDM: CPT | Performed by: EMERGENCY MEDICINE

## 2018-01-17 PROCEDURE — 99220 PR INITIAL OBSERVATION CARE/DAY 70 MINUTES: CPT | Performed by: INTERNAL MEDICINE

## 2018-01-17 PROCEDURE — 72125 CT NECK SPINE W/O DYE: CPT

## 2018-01-17 PROCEDURE — 36415 COLL VENOUS BLD VENIPUNCTURE: CPT

## 2018-01-17 PROCEDURE — 84484 ASSAY OF TROPONIN QUANT: CPT

## 2018-01-17 PROCEDURE — G0378 HOSPITAL OBSERVATION PER HR: HCPCS

## 2018-01-17 PROCEDURE — 85025 COMPLETE CBC W/AUTO DIFF WBC: CPT

## 2018-01-17 PROCEDURE — 96372 THER/PROPH/DIAG INJ SC/IM: CPT

## 2018-01-17 PROCEDURE — 71045 X-RAY EXAM CHEST 1 VIEW: CPT

## 2018-01-17 PROCEDURE — 12011 RPR F/E/E/N/L/M 2.5 CM/<: CPT | Performed by: NURSE PRACTITIONER

## 2018-01-17 PROCEDURE — 6370000000 HC RX 637 (ALT 250 FOR IP): Performed by: PHYSICIAN ASSISTANT

## 2018-01-17 PROCEDURE — 6360000002 HC RX W HCPCS: Performed by: PHYSICIAN ASSISTANT

## 2018-01-17 PROCEDURE — 6360000002 HC RX W HCPCS: Performed by: NURSE PRACTITIONER

## 2018-01-17 PROCEDURE — 81003 URINALYSIS AUTO W/O SCOPE: CPT

## 2018-01-17 PROCEDURE — 12011 RPR F/E/E/N/L/M 2.5 CM/<: CPT

## 2018-01-17 PROCEDURE — 93005 ELECTROCARDIOGRAM TRACING: CPT

## 2018-01-17 PROCEDURE — 80053 COMPREHEN METABOLIC PANEL: CPT

## 2018-01-17 PROCEDURE — 83605 ASSAY OF LACTIC ACID: CPT

## 2018-01-17 PROCEDURE — 2580000003 HC RX 258: Performed by: PHYSICIAN ASSISTANT

## 2018-01-17 PROCEDURE — 90715 TDAP VACCINE 7 YRS/> IM: CPT | Performed by: NURSE PRACTITIONER

## 2018-01-17 PROCEDURE — 82550 ASSAY OF CK (CPK): CPT

## 2018-01-17 RX ORDER — ONDANSETRON 2 MG/ML
4 INJECTION INTRAMUSCULAR; INTRAVENOUS EVERY 6 HOURS PRN
Status: DISCONTINUED | OUTPATIENT
Start: 2018-01-17 | End: 2018-01-24 | Stop reason: HOSPADM

## 2018-01-17 RX ORDER — HYDROCODONE BITARTRATE AND ACETAMINOPHEN 10; 325 MG/1; MG/1
1 TABLET ORAL EVERY 6 HOURS PRN
Status: DISCONTINUED | OUTPATIENT
Start: 2018-01-17 | End: 2018-01-21

## 2018-01-17 RX ORDER — GUAIFENESIN 600 MG/1
600 TABLET, EXTENDED RELEASE ORAL 2 TIMES DAILY
Status: DISCONTINUED | OUTPATIENT
Start: 2018-01-17 | End: 2018-01-24 | Stop reason: HOSPADM

## 2018-01-17 RX ORDER — SODIUM CHLORIDE 0.9 % (FLUSH) 0.9 %
10 SYRINGE (ML) INJECTION PRN
Status: DISCONTINUED | OUTPATIENT
Start: 2018-01-17 | End: 2018-01-24 | Stop reason: HOSPADM

## 2018-01-17 RX ORDER — GABAPENTIN 300 MG/1
300 CAPSULE ORAL 3 TIMES DAILY
Status: DISCONTINUED | OUTPATIENT
Start: 2018-01-17 | End: 2018-01-24 | Stop reason: HOSPADM

## 2018-01-17 RX ORDER — ACETAMINOPHEN 325 MG/1
650 TABLET ORAL EVERY 4 HOURS PRN
Status: DISCONTINUED | OUTPATIENT
Start: 2018-01-17 | End: 2018-01-24 | Stop reason: HOSPADM

## 2018-01-17 RX ORDER — LIDOCAINE HYDROCHLORIDE 10 MG/ML
20 INJECTION, SOLUTION INFILTRATION; PERINEURAL ONCE
Status: COMPLETED | OUTPATIENT
Start: 2018-01-17 | End: 2018-01-17

## 2018-01-17 RX ORDER — SODIUM CHLORIDE 0.9 % (FLUSH) 0.9 %
10 SYRINGE (ML) INJECTION EVERY 12 HOURS SCHEDULED
Status: DISCONTINUED | OUTPATIENT
Start: 2018-01-17 | End: 2018-01-24 | Stop reason: HOSPADM

## 2018-01-17 RX ADMIN — ENOXAPARIN SODIUM 40 MG: 40 INJECTION SUBCUTANEOUS at 20:50

## 2018-01-17 RX ADMIN — TETANUS TOXOID, REDUCED DIPHTHERIA TOXOID AND ACELLULAR PERTUSSIS VACCINE, ADSORBED 0.5 ML: 5; 2.5; 8; 8; 2.5 SUSPENSION INTRAMUSCULAR at 09:59

## 2018-01-17 RX ADMIN — Medication 10 ML: at 20:50

## 2018-01-17 RX ADMIN — IOPAMIDOL 90 ML: 755 INJECTION, SOLUTION INTRAVENOUS at 09:32

## 2018-01-17 RX ADMIN — LIDOCAINE HYDROCHLORIDE 20 ML: 10 INJECTION, SOLUTION INFILTRATION; PERINEURAL at 09:56

## 2018-01-17 RX ADMIN — HYDROCODONE BITARTRATE AND ACETAMINOPHEN 1 TABLET: 10; 325 TABLET ORAL at 20:50

## 2018-01-17 RX ADMIN — GABAPENTIN 300 MG: 300 CAPSULE ORAL at 20:51

## 2018-01-17 ASSESSMENT — PAIN SCALES - GENERAL
PAINLEVEL_OUTOF10: 7
PAINLEVEL_OUTOF10: 10

## 2018-01-17 ASSESSMENT — ENCOUNTER SYMPTOMS
ABDOMINAL PAIN: 1
FACIAL SWELLING: 1

## 2018-01-18 LAB
ANION GAP SERPL CALCULATED.3IONS-SCNC: 12 MMOL/L (ref 7–19)
BUN BLDV-MCNC: 12 MG/DL (ref 8–23)
CALCIUM SERPL-MCNC: 9.2 MG/DL (ref 8.8–10.2)
CHLORIDE BLD-SCNC: 101 MMOL/L (ref 98–111)
CO2: 26 MMOL/L (ref 22–29)
CREAT SERPL-MCNC: 0.9 MG/DL (ref 0.5–1.2)
GFR NON-AFRICAN AMERICAN: >60
GLUCOSE BLD-MCNC: 103 MG/DL (ref 74–109)
HBA1C MFR BLD: 5.4 %
HCT VFR BLD CALC: 40.7 % (ref 42–52)
HEMOGLOBIN: 13.6 G/DL (ref 14–18)
LV EF: 55 %
LVEF MODALITY: NORMAL
MCH RBC QN AUTO: 29.4 PG (ref 27–31)
MCHC RBC AUTO-ENTMCNC: 33.4 G/DL (ref 33–37)
MCV RBC AUTO: 87.9 FL (ref 80–94)
PDW BLD-RTO: 14.2 % (ref 11.5–14.5)
PLATELET # BLD: 110 K/UL (ref 130–400)
PMV BLD AUTO: 10.4 FL (ref 9.4–12.4)
POTASSIUM REFLEX MAGNESIUM: 4.1 MMOL/L (ref 3.5–5)
RBC # BLD: 4.63 M/UL (ref 4.7–6.1)
SODIUM BLD-SCNC: 139 MMOL/L (ref 136–145)
WBC # BLD: 9.6 K/UL (ref 4.8–10.8)

## 2018-01-18 PROCEDURE — 6370000000 HC RX 637 (ALT 250 FOR IP): Performed by: INTERNAL MEDICINE

## 2018-01-18 PROCEDURE — 83036 HEMOGLOBIN GLYCOSYLATED A1C: CPT

## 2018-01-18 PROCEDURE — G0378 HOSPITAL OBSERVATION PER HR: HCPCS

## 2018-01-18 PROCEDURE — 93306 TTE W/DOPPLER COMPLETE: CPT

## 2018-01-18 PROCEDURE — 2580000003 HC RX 258: Performed by: PHYSICIAN ASSISTANT

## 2018-01-18 PROCEDURE — 80048 BASIC METABOLIC PNL TOTAL CA: CPT

## 2018-01-18 PROCEDURE — 6370000000 HC RX 637 (ALT 250 FOR IP): Performed by: PHYSICIAN ASSISTANT

## 2018-01-18 PROCEDURE — 99225 PR SBSQ OBSERVATION CARE/DAY 25 MINUTES: CPT | Performed by: INTERNAL MEDICINE

## 2018-01-18 PROCEDURE — 96372 THER/PROPH/DIAG INJ SC/IM: CPT

## 2018-01-18 PROCEDURE — G8997 SWALLOW GOAL STATUS: HCPCS

## 2018-01-18 PROCEDURE — 92610 EVALUATE SWALLOWING FUNCTION: CPT

## 2018-01-18 PROCEDURE — 6360000002 HC RX W HCPCS: Performed by: PHYSICIAN ASSISTANT

## 2018-01-18 PROCEDURE — 85027 COMPLETE CBC AUTOMATED: CPT

## 2018-01-18 PROCEDURE — 36415 COLL VENOUS BLD VENIPUNCTURE: CPT

## 2018-01-18 PROCEDURE — G8996 SWALLOW CURRENT STATUS: HCPCS

## 2018-01-18 RX ADMIN — GABAPENTIN 300 MG: 300 CAPSULE ORAL at 14:39

## 2018-01-18 RX ADMIN — GABAPENTIN 300 MG: 300 CAPSULE ORAL at 09:31

## 2018-01-18 RX ADMIN — HYDROCODONE BITARTRATE AND ACETAMINOPHEN 1 TABLET: 10; 325 TABLET ORAL at 22:38

## 2018-01-18 RX ADMIN — GUAIFENESIN 600 MG: 600 TABLET, EXTENDED RELEASE ORAL at 21:04

## 2018-01-18 RX ADMIN — Medication 10 ML: at 09:31

## 2018-01-18 RX ADMIN — HYDROCODONE BITARTRATE AND ACETAMINOPHEN 1 TABLET: 10; 325 TABLET ORAL at 16:37

## 2018-01-18 RX ADMIN — ENOXAPARIN SODIUM 40 MG: 40 INJECTION SUBCUTANEOUS at 18:36

## 2018-01-18 RX ADMIN — GABAPENTIN 300 MG: 300 CAPSULE ORAL at 21:04

## 2018-01-18 RX ADMIN — GUAIFENESIN 600 MG: 600 TABLET, EXTENDED RELEASE ORAL at 00:14

## 2018-01-18 RX ADMIN — GUAIFENESIN 600 MG: 600 TABLET, EXTENDED RELEASE ORAL at 09:31

## 2018-01-18 RX ADMIN — HYDROCODONE BITARTRATE AND ACETAMINOPHEN 1 TABLET: 10; 325 TABLET ORAL at 09:37

## 2018-01-18 RX ADMIN — Medication 10 ML: at 21:05

## 2018-01-18 ASSESSMENT — PAIN SCALES - GENERAL
PAINLEVEL_OUTOF10: 4
PAINLEVEL_OUTOF10: 5
PAINLEVEL_OUTOF10: 6
PAINLEVEL_OUTOF10: 5
PAINLEVEL_OUTOF10: 0
PAINLEVEL_OUTOF10: 5

## 2018-01-19 ENCOUNTER — TELEPHONE (OUTPATIENT)
Dept: FAMILY MEDICINE CLINIC | Age: 78
End: 2018-01-19

## 2018-01-19 PROCEDURE — 6370000000 HC RX 637 (ALT 250 FOR IP): Performed by: PHYSICIAN ASSISTANT

## 2018-01-19 PROCEDURE — 6370000000 HC RX 637 (ALT 250 FOR IP): Performed by: INTERNAL MEDICINE

## 2018-01-19 PROCEDURE — 92526 ORAL FUNCTION THERAPY: CPT

## 2018-01-19 PROCEDURE — 6360000002 HC RX W HCPCS: Performed by: PHYSICIAN ASSISTANT

## 2018-01-19 PROCEDURE — 2580000003 HC RX 258: Performed by: PHYSICIAN ASSISTANT

## 2018-01-19 PROCEDURE — 96372 THER/PROPH/DIAG INJ SC/IM: CPT

## 2018-01-19 PROCEDURE — G0378 HOSPITAL OBSERVATION PER HR: HCPCS

## 2018-01-19 PROCEDURE — 99225 PR SBSQ OBSERVATION CARE/DAY 25 MINUTES: CPT | Performed by: INTERNAL MEDICINE

## 2018-01-19 RX ORDER — FLUDROCORTISONE ACETATE 0.1 MG/1
0.1 TABLET ORAL DAILY
Status: DISCONTINUED | OUTPATIENT
Start: 2018-01-19 | End: 2018-01-20

## 2018-01-19 RX ADMIN — HYDROCODONE BITARTRATE AND ACETAMINOPHEN 1 TABLET: 10; 325 TABLET ORAL at 19:52

## 2018-01-19 RX ADMIN — ENOXAPARIN SODIUM 40 MG: 40 INJECTION SUBCUTANEOUS at 16:59

## 2018-01-19 RX ADMIN — GABAPENTIN 300 MG: 300 CAPSULE ORAL at 08:15

## 2018-01-19 RX ADMIN — GABAPENTIN 300 MG: 300 CAPSULE ORAL at 19:59

## 2018-01-19 RX ADMIN — Medication 10 ML: at 08:18

## 2018-01-19 RX ADMIN — GUAIFENESIN 600 MG: 600 TABLET, EXTENDED RELEASE ORAL at 19:51

## 2018-01-19 RX ADMIN — HYDROCODONE BITARTRATE AND ACETAMINOPHEN 1 TABLET: 10; 325 TABLET ORAL at 12:46

## 2018-01-19 RX ADMIN — Medication 10 ML: at 19:59

## 2018-01-19 RX ADMIN — GABAPENTIN 300 MG: 300 CAPSULE ORAL at 13:48

## 2018-01-19 RX ADMIN — HYDROCODONE BITARTRATE AND ACETAMINOPHEN 1 TABLET: 10; 325 TABLET ORAL at 06:49

## 2018-01-19 RX ADMIN — GUAIFENESIN 600 MG: 600 TABLET, EXTENDED RELEASE ORAL at 08:16

## 2018-01-19 RX ADMIN — FLUDROCORTISONE ACETATE 0.1 MG: 0.1 TABLET ORAL at 16:59

## 2018-01-19 ASSESSMENT — PAIN SCALES - GENERAL
PAINLEVEL_OUTOF10: 7
PAINLEVEL_OUTOF10: 10
PAINLEVEL_OUTOF10: 5
PAINLEVEL_OUTOF10: 2

## 2018-01-20 PROBLEM — R93.89 ABNORMAL CT OF THE CHEST: Status: ACTIVE | Noted: 2018-01-20

## 2018-01-20 PROCEDURE — 96372 THER/PROPH/DIAG INJ SC/IM: CPT

## 2018-01-20 PROCEDURE — G8979 MOBILITY GOAL STATUS: HCPCS

## 2018-01-20 PROCEDURE — 97162 PT EVAL MOD COMPLEX 30 MIN: CPT

## 2018-01-20 PROCEDURE — 97750 PHYSICAL PERFORMANCE TEST: CPT

## 2018-01-20 PROCEDURE — 6360000002 HC RX W HCPCS: Performed by: PHYSICIAN ASSISTANT

## 2018-01-20 PROCEDURE — G0378 HOSPITAL OBSERVATION PER HR: HCPCS

## 2018-01-20 PROCEDURE — 6370000000 HC RX 637 (ALT 250 FOR IP): Performed by: HOSPITALIST

## 2018-01-20 PROCEDURE — 6370000000 HC RX 637 (ALT 250 FOR IP): Performed by: INTERNAL MEDICINE

## 2018-01-20 PROCEDURE — 99226 PR SBSQ OBSERVATION CARE/DAY 35 MINUTES: CPT | Performed by: HOSPITALIST

## 2018-01-20 PROCEDURE — 6370000000 HC RX 637 (ALT 250 FOR IP): Performed by: PHYSICIAN ASSISTANT

## 2018-01-20 PROCEDURE — 2580000003 HC RX 258: Performed by: PHYSICIAN ASSISTANT

## 2018-01-20 PROCEDURE — G8978 MOBILITY CURRENT STATUS: HCPCS

## 2018-01-20 RX ORDER — FLUDROCORTISONE ACETATE 0.1 MG/1
0.1 TABLET ORAL ONCE
Status: COMPLETED | OUTPATIENT
Start: 2018-01-20 | End: 2018-01-20

## 2018-01-20 RX ORDER — MIDODRINE HYDROCHLORIDE 2.5 MG/1
2.5 TABLET ORAL
Status: DISCONTINUED | OUTPATIENT
Start: 2018-01-20 | End: 2018-01-21

## 2018-01-20 RX ORDER — FLUDROCORTISONE ACETATE 0.1 MG/1
0.2 TABLET ORAL DAILY
Status: DISCONTINUED | OUTPATIENT
Start: 2018-01-21 | End: 2018-01-24 | Stop reason: HOSPADM

## 2018-01-20 RX ORDER — LISINOPRIL 10 MG/1
10 TABLET ORAL DAILY
Status: DISCONTINUED | OUTPATIENT
Start: 2018-01-20 | End: 2018-01-21

## 2018-01-20 RX ADMIN — GUAIFENESIN 600 MG: 600 TABLET, EXTENDED RELEASE ORAL at 09:42

## 2018-01-20 RX ADMIN — Medication 10 ML: at 09:43

## 2018-01-20 RX ADMIN — FLUDROCORTISONE ACETATE 0.1 MG: 0.1 TABLET ORAL at 14:21

## 2018-01-20 RX ADMIN — Medication 10 ML: at 21:04

## 2018-01-20 RX ADMIN — GABAPENTIN 300 MG: 300 CAPSULE ORAL at 14:21

## 2018-01-20 RX ADMIN — LISINOPRIL 10 MG: 10 TABLET ORAL at 17:24

## 2018-01-20 RX ADMIN — HYDROCODONE BITARTRATE AND ACETAMINOPHEN 1 TABLET: 10; 325 TABLET ORAL at 16:59

## 2018-01-20 RX ADMIN — HYDROCODONE BITARTRATE AND ACETAMINOPHEN 1 TABLET: 10; 325 TABLET ORAL at 23:06

## 2018-01-20 RX ADMIN — GABAPENTIN 300 MG: 300 CAPSULE ORAL at 09:42

## 2018-01-20 RX ADMIN — FLUDROCORTISONE ACETATE 0.1 MG: 0.1 TABLET ORAL at 09:42

## 2018-01-20 RX ADMIN — ENOXAPARIN SODIUM 40 MG: 40 INJECTION SUBCUTANEOUS at 17:24

## 2018-01-20 RX ADMIN — GUAIFENESIN 600 MG: 600 TABLET, EXTENDED RELEASE ORAL at 21:04

## 2018-01-20 RX ADMIN — HYDROCODONE BITARTRATE AND ACETAMINOPHEN 1 TABLET: 10; 325 TABLET ORAL at 03:45

## 2018-01-20 RX ADMIN — HYDROCODONE BITARTRATE AND ACETAMINOPHEN 1 TABLET: 10; 325 TABLET ORAL at 10:04

## 2018-01-20 RX ADMIN — MIDODRINE HYDROCHLORIDE 2.5 MG: 2.5 TABLET ORAL at 17:24

## 2018-01-20 RX ADMIN — GABAPENTIN 300 MG: 300 CAPSULE ORAL at 21:04

## 2018-01-20 ASSESSMENT — PAIN DESCRIPTION - ORIENTATION: ORIENTATION: LOWER

## 2018-01-20 ASSESSMENT — PAIN SCALES - GENERAL
PAINLEVEL_OUTOF10: 5
PAINLEVEL_OUTOF10: 7
PAINLEVEL_OUTOF10: 10
PAINLEVEL_OUTOF10: 7
PAINLEVEL_OUTOF10: 10

## 2018-01-20 ASSESSMENT — PAIN DESCRIPTION - PAIN TYPE: TYPE: CHRONIC PAIN

## 2018-01-20 ASSESSMENT — PAIN DESCRIPTION - LOCATION: LOCATION: BACK

## 2018-01-21 LAB
ANION GAP SERPL CALCULATED.3IONS-SCNC: 11 MMOL/L (ref 7–19)
BUN BLDV-MCNC: 18 MG/DL (ref 8–23)
CALCIUM SERPL-MCNC: 9.6 MG/DL (ref 8.8–10.2)
CHLORIDE BLD-SCNC: 100 MMOL/L (ref 98–111)
CO2: 28 MMOL/L (ref 22–29)
CREAT SERPL-MCNC: 1.1 MG/DL (ref 0.5–1.2)
GFR NON-AFRICAN AMERICAN: >60
GLUCOSE BLD-MCNC: 95 MG/DL (ref 74–109)
HCT VFR BLD CALC: 42.1 % (ref 42–52)
HEMOGLOBIN: 13.9 G/DL (ref 14–18)
MCH RBC QN AUTO: 29.2 PG (ref 27–31)
MCHC RBC AUTO-ENTMCNC: 33 G/DL (ref 33–37)
MCV RBC AUTO: 88.4 FL (ref 80–94)
PDW BLD-RTO: 13.9 % (ref 11.5–14.5)
PLATELET # BLD: 123 K/UL (ref 130–400)
PMV BLD AUTO: 9.9 FL (ref 9.4–12.4)
POTASSIUM SERPL-SCNC: 4.2 MMOL/L (ref 3.5–5)
RBC # BLD: 4.76 M/UL (ref 4.7–6.1)
SODIUM BLD-SCNC: 139 MMOL/L (ref 136–145)
WBC # BLD: 6.9 K/UL (ref 4.8–10.8)

## 2018-01-21 PROCEDURE — 85027 COMPLETE CBC AUTOMATED: CPT

## 2018-01-21 PROCEDURE — 99226 PR SBSQ OBSERVATION CARE/DAY 35 MINUTES: CPT | Performed by: HOSPITALIST

## 2018-01-21 PROCEDURE — 6360000002 HC RX W HCPCS: Performed by: PHYSICIAN ASSISTANT

## 2018-01-21 PROCEDURE — 96372 THER/PROPH/DIAG INJ SC/IM: CPT

## 2018-01-21 PROCEDURE — G0378 HOSPITAL OBSERVATION PER HR: HCPCS

## 2018-01-21 PROCEDURE — 6370000000 HC RX 637 (ALT 250 FOR IP): Performed by: INTERNAL MEDICINE

## 2018-01-21 PROCEDURE — 6370000000 HC RX 637 (ALT 250 FOR IP): Performed by: HOSPITALIST

## 2018-01-21 PROCEDURE — 2580000003 HC RX 258: Performed by: PHYSICIAN ASSISTANT

## 2018-01-21 PROCEDURE — 2580000003 HC RX 258: Performed by: HOSPITALIST

## 2018-01-21 PROCEDURE — 80048 BASIC METABOLIC PNL TOTAL CA: CPT

## 2018-01-21 PROCEDURE — 6370000000 HC RX 637 (ALT 250 FOR IP): Performed by: PHYSICIAN ASSISTANT

## 2018-01-21 PROCEDURE — 36415 COLL VENOUS BLD VENIPUNCTURE: CPT

## 2018-01-21 RX ORDER — MIDODRINE HYDROCHLORIDE 2.5 MG/1
5 TABLET ORAL
Status: DISCONTINUED | OUTPATIENT
Start: 2018-01-21 | End: 2018-01-24 | Stop reason: HOSPADM

## 2018-01-21 RX ORDER — SODIUM CHLORIDE 9 MG/ML
INJECTION, SOLUTION INTRAVENOUS CONTINUOUS
Status: DISCONTINUED | OUTPATIENT
Start: 2018-01-21 | End: 2018-01-23

## 2018-01-21 RX ORDER — HYDROCODONE BITARTRATE AND ACETAMINOPHEN 10; 325 MG/1; MG/1
1 TABLET ORAL
Status: DISCONTINUED | OUTPATIENT
Start: 2018-01-21 | End: 2018-01-21

## 2018-01-21 RX ORDER — LISINOPRIL 2.5 MG/1
5 TABLET ORAL DAILY
Status: DISCONTINUED | OUTPATIENT
Start: 2018-01-22 | End: 2018-01-21

## 2018-01-21 RX ORDER — HYDROCODONE BITARTRATE AND ACETAMINOPHEN 10; 325 MG/1; MG/1
1 TABLET ORAL EVERY 4 HOURS PRN
Status: DISCONTINUED | OUTPATIENT
Start: 2018-01-21 | End: 2018-01-24 | Stop reason: HOSPADM

## 2018-01-21 RX ADMIN — HYDROCODONE BITARTRATE AND ACETAMINOPHEN 1 TABLET: 10; 325 TABLET ORAL at 21:48

## 2018-01-21 RX ADMIN — GUAIFENESIN 600 MG: 600 TABLET, EXTENDED RELEASE ORAL at 08:02

## 2018-01-21 RX ADMIN — HYDROCODONE BITARTRATE AND ACETAMINOPHEN 1 TABLET: 10; 325 TABLET ORAL at 11:17

## 2018-01-21 RX ADMIN — ENOXAPARIN SODIUM 40 MG: 40 INJECTION SUBCUTANEOUS at 17:31

## 2018-01-21 RX ADMIN — Medication 10 ML: at 21:48

## 2018-01-21 RX ADMIN — GUAIFENESIN 600 MG: 600 TABLET, EXTENDED RELEASE ORAL at 21:48

## 2018-01-21 RX ADMIN — SODIUM CHLORIDE: 9 INJECTION, SOLUTION INTRAVENOUS at 17:42

## 2018-01-21 RX ADMIN — FLUDROCORTISONE ACETATE 0.2 MG: 0.1 TABLET ORAL at 08:02

## 2018-01-21 RX ADMIN — HYDROCODONE BITARTRATE AND ACETAMINOPHEN 1 TABLET: 10; 325 TABLET ORAL at 17:31

## 2018-01-21 RX ADMIN — LISINOPRIL 10 MG: 10 TABLET ORAL at 08:02

## 2018-01-21 RX ADMIN — HYDROCODONE BITARTRATE AND ACETAMINOPHEN 1 TABLET: 10; 325 TABLET ORAL at 05:04

## 2018-01-21 RX ADMIN — GABAPENTIN 300 MG: 300 CAPSULE ORAL at 08:02

## 2018-01-21 RX ADMIN — MIDODRINE HYDROCHLORIDE 2.5 MG: 2.5 TABLET ORAL at 11:18

## 2018-01-21 RX ADMIN — MIDODRINE HYDROCHLORIDE 5 MG: 2.5 TABLET ORAL at 17:32

## 2018-01-21 RX ADMIN — Medication 10 ML: at 08:02

## 2018-01-21 RX ADMIN — MIDODRINE HYDROCHLORIDE 2.5 MG: 2.5 TABLET ORAL at 08:02

## 2018-01-21 RX ADMIN — GABAPENTIN 300 MG: 300 CAPSULE ORAL at 14:03

## 2018-01-21 RX ADMIN — GABAPENTIN 300 MG: 300 CAPSULE ORAL at 21:47

## 2018-01-21 ASSESSMENT — PAIN SCALES - GENERAL
PAINLEVEL_OUTOF10: 10
PAINLEVEL_OUTOF10: 10
PAINLEVEL_OUTOF10: 5
PAINLEVEL_OUTOF10: 4

## 2018-01-22 PROBLEM — M54.50 CHRONIC BILATERAL LOW BACK PAIN WITHOUT SCIATICA: Status: ACTIVE | Noted: 2018-01-22

## 2018-01-22 PROBLEM — G89.29 CHRONIC BILATERAL LOW BACK PAIN WITHOUT SCIATICA: Status: ACTIVE | Noted: 2018-01-22

## 2018-01-22 PROBLEM — I95.1 ORTHOSTATIC HYPOTENSION: Status: ACTIVE | Noted: 2018-01-22

## 2018-01-22 LAB
ANION GAP SERPL CALCULATED.3IONS-SCNC: 11 MMOL/L (ref 7–19)
BUN BLDV-MCNC: 17 MG/DL (ref 8–23)
CALCIUM SERPL-MCNC: 8.7 MG/DL (ref 8.8–10.2)
CHLORIDE BLD-SCNC: 99 MMOL/L (ref 98–111)
CO2: 26 MMOL/L (ref 22–29)
CREAT SERPL-MCNC: 1 MG/DL (ref 0.5–1.2)
FOLATE: 10.6 NG/ML (ref 4.5–32.2)
GFR NON-AFRICAN AMERICAN: >60
GLUCOSE BLD-MCNC: 88 MG/DL (ref 74–109)
HCT VFR BLD CALC: 36.9 % (ref 42–52)
HEMOGLOBIN: 12.3 G/DL (ref 14–18)
MCH RBC QN AUTO: 29.2 PG (ref 27–31)
MCHC RBC AUTO-ENTMCNC: 33.3 G/DL (ref 33–37)
MCV RBC AUTO: 87.6 FL (ref 80–94)
PDW BLD-RTO: 14 % (ref 11.5–14.5)
PLATELET # BLD: 121 K/UL (ref 130–400)
PMV BLD AUTO: 10.3 FL (ref 9.4–12.4)
POTASSIUM SERPL-SCNC: 3.8 MMOL/L (ref 3.5–5)
RBC # BLD: 4.21 M/UL (ref 4.7–6.1)
SODIUM BLD-SCNC: 136 MMOL/L (ref 136–145)
T4 FREE: 1.2 NG/DL (ref 0.9–1.7)
TSH SERPL DL<=0.05 MIU/L-ACNC: 4.55 UIU/ML (ref 0.27–4.2)
VITAMIN B-12: 466 PG/ML (ref 211–946)
WBC # BLD: 7.6 K/UL (ref 4.8–10.8)

## 2018-01-22 PROCEDURE — 99999 PR OFFICE/OUTPT VISIT,PROCEDURE ONLY: CPT | Performed by: PSYCHIATRY & NEUROLOGY

## 2018-01-22 PROCEDURE — 97110 THERAPEUTIC EXERCISES: CPT

## 2018-01-22 PROCEDURE — 36415 COLL VENOUS BLD VENIPUNCTURE: CPT

## 2018-01-22 PROCEDURE — 99232 SBSQ HOSP IP/OBS MODERATE 35: CPT | Performed by: PHYSICIAN ASSISTANT

## 2018-01-22 PROCEDURE — 82530 CORTISOL FREE: CPT

## 2018-01-22 PROCEDURE — 84443 ASSAY THYROID STIM HORMONE: CPT

## 2018-01-22 PROCEDURE — 84439 ASSAY OF FREE THYROXINE: CPT

## 2018-01-22 PROCEDURE — 1210000000 HC MED SURG R&B

## 2018-01-22 PROCEDURE — 85027 COMPLETE CBC AUTOMATED: CPT

## 2018-01-22 PROCEDURE — 6370000000 HC RX 637 (ALT 250 FOR IP): Performed by: HOSPITALIST

## 2018-01-22 PROCEDURE — 82088 ASSAY OF ALDOSTERONE: CPT

## 2018-01-22 PROCEDURE — 99221 1ST HOSP IP/OBS SF/LOW 40: CPT | Performed by: INTERNAL MEDICINE

## 2018-01-22 PROCEDURE — 6370000000 HC RX 637 (ALT 250 FOR IP): Performed by: PHYSICIAN ASSISTANT

## 2018-01-22 PROCEDURE — 6370000000 HC RX 637 (ALT 250 FOR IP): Performed by: INTERNAL MEDICINE

## 2018-01-22 PROCEDURE — 82746 ASSAY OF FOLIC ACID SERUM: CPT

## 2018-01-22 PROCEDURE — 82607 VITAMIN B-12: CPT

## 2018-01-22 PROCEDURE — 6360000002 HC RX W HCPCS: Performed by: PHYSICIAN ASSISTANT

## 2018-01-22 PROCEDURE — 82626 DEHYDROEPIANDROSTERONE: CPT

## 2018-01-22 PROCEDURE — 80048 BASIC METABOLIC PNL TOTAL CA: CPT

## 2018-01-22 PROCEDURE — 2580000003 HC RX 258: Performed by: PHYSICIAN ASSISTANT

## 2018-01-22 RX ADMIN — ENOXAPARIN SODIUM 40 MG: 40 INJECTION SUBCUTANEOUS at 17:59

## 2018-01-22 RX ADMIN — MIDODRINE HYDROCHLORIDE 5 MG: 2.5 TABLET ORAL at 12:43

## 2018-01-22 RX ADMIN — GUAIFENESIN 600 MG: 600 TABLET, EXTENDED RELEASE ORAL at 08:46

## 2018-01-22 RX ADMIN — HYDROCODONE BITARTRATE AND ACETAMINOPHEN 1 TABLET: 10; 325 TABLET ORAL at 16:45

## 2018-01-22 RX ADMIN — GABAPENTIN 300 MG: 300 CAPSULE ORAL at 20:54

## 2018-01-22 RX ADMIN — HYDROCODONE BITARTRATE AND ACETAMINOPHEN 1 TABLET: 10; 325 TABLET ORAL at 12:43

## 2018-01-22 RX ADMIN — MIDODRINE HYDROCHLORIDE 5 MG: 2.5 TABLET ORAL at 08:46

## 2018-01-22 RX ADMIN — Medication 10 ML: at 08:47

## 2018-01-22 RX ADMIN — MIDODRINE HYDROCHLORIDE 5 MG: 2.5 TABLET ORAL at 16:45

## 2018-01-22 RX ADMIN — HYDROCODONE BITARTRATE AND ACETAMINOPHEN 1 TABLET: 10; 325 TABLET ORAL at 08:46

## 2018-01-22 RX ADMIN — GABAPENTIN 300 MG: 300 CAPSULE ORAL at 08:46

## 2018-01-22 RX ADMIN — GABAPENTIN 300 MG: 300 CAPSULE ORAL at 14:39

## 2018-01-22 RX ADMIN — GUAIFENESIN 600 MG: 600 TABLET, EXTENDED RELEASE ORAL at 20:54

## 2018-01-22 RX ADMIN — FLUDROCORTISONE ACETATE 0.2 MG: 0.1 TABLET ORAL at 08:46

## 2018-01-22 RX ADMIN — Medication 10 ML: at 20:54

## 2018-01-22 RX ADMIN — HYDROCODONE BITARTRATE AND ACETAMINOPHEN 1 TABLET: 10; 325 TABLET ORAL at 20:54

## 2018-01-22 ASSESSMENT — PAIN SCALES - GENERAL
PAINLEVEL_OUTOF10: 3
PAINLEVEL_OUTOF10: 4
PAINLEVEL_OUTOF10: 4
PAINLEVEL_OUTOF10: 3
PAINLEVEL_OUTOF10: 4

## 2018-01-22 ASSESSMENT — ENCOUNTER SYMPTOMS: SHORTNESS OF BREATH: 0

## 2018-01-23 LAB
ALDOSTERONE: 3.7 NG/DL
ANION GAP SERPL CALCULATED.3IONS-SCNC: 12 MMOL/L (ref 7–19)
BUN BLDV-MCNC: 12 MG/DL (ref 8–23)
CALCIUM SERPL-MCNC: 8.8 MG/DL (ref 8.8–10.2)
CHLORIDE BLD-SCNC: 103 MMOL/L (ref 98–111)
CO2: 22 MMOL/L (ref 22–29)
CREAT SERPL-MCNC: 0.8 MG/DL (ref 0.5–1.2)
GFR NON-AFRICAN AMERICAN: >60
GLUCOSE BLD-MCNC: 88 MG/DL (ref 74–109)
HCT VFR BLD CALC: 36.8 % (ref 42–52)
HEMOGLOBIN: 12.2 G/DL (ref 14–18)
MCH RBC QN AUTO: 29.3 PG (ref 27–31)
MCHC RBC AUTO-ENTMCNC: 33.2 G/DL (ref 33–37)
MCV RBC AUTO: 88.5 FL (ref 80–94)
PDW BLD-RTO: 13.6 % (ref 11.5–14.5)
PLATELET # BLD: 114 K/UL (ref 130–400)
PMV BLD AUTO: 10.1 FL (ref 9.4–12.4)
POTASSIUM SERPL-SCNC: 3.9 MMOL/L (ref 3.5–5)
RBC # BLD: 4.16 M/UL (ref 4.7–6.1)
SODIUM BLD-SCNC: 137 MMOL/L (ref 136–145)
WBC # BLD: 5.9 K/UL (ref 4.8–10.8)

## 2018-01-23 PROCEDURE — 36415 COLL VENOUS BLD VENIPUNCTURE: CPT

## 2018-01-23 PROCEDURE — 6360000002 HC RX W HCPCS: Performed by: PHYSICIAN ASSISTANT

## 2018-01-23 PROCEDURE — 2580000003 HC RX 258: Performed by: HOSPITALIST

## 2018-01-23 PROCEDURE — 1210000000 HC MED SURG R&B

## 2018-01-23 PROCEDURE — 2580000003 HC RX 258: Performed by: PHYSICIAN ASSISTANT

## 2018-01-23 PROCEDURE — 99231 SBSQ HOSP IP/OBS SF/LOW 25: CPT | Performed by: INTERNAL MEDICINE

## 2018-01-23 PROCEDURE — 80048 BASIC METABOLIC PNL TOTAL CA: CPT

## 2018-01-23 PROCEDURE — 85027 COMPLETE CBC AUTOMATED: CPT

## 2018-01-23 PROCEDURE — 6370000000 HC RX 637 (ALT 250 FOR IP): Performed by: INTERNAL MEDICINE

## 2018-01-23 PROCEDURE — 99232 SBSQ HOSP IP/OBS MODERATE 35: CPT | Performed by: FAMILY MEDICINE

## 2018-01-23 PROCEDURE — 97116 GAIT TRAINING THERAPY: CPT

## 2018-01-23 PROCEDURE — 6370000000 HC RX 637 (ALT 250 FOR IP): Performed by: HOSPITALIST

## 2018-01-23 PROCEDURE — 6370000000 HC RX 637 (ALT 250 FOR IP): Performed by: PHYSICIAN ASSISTANT

## 2018-01-23 RX ORDER — PANTOPRAZOLE SODIUM 40 MG/1
40 TABLET, DELAYED RELEASE ORAL
Status: DISCONTINUED | OUTPATIENT
Start: 2018-01-24 | End: 2018-01-24 | Stop reason: HOSPADM

## 2018-01-23 RX ORDER — CALCIUM CARBONATE 200(500)MG
1000 TABLET,CHEWABLE ORAL 3 TIMES DAILY PRN
Status: DISCONTINUED | OUTPATIENT
Start: 2018-01-23 | End: 2018-01-24 | Stop reason: HOSPADM

## 2018-01-23 RX ADMIN — GUAIFENESIN 600 MG: 600 TABLET, EXTENDED RELEASE ORAL at 08:15

## 2018-01-23 RX ADMIN — MIDODRINE HYDROCHLORIDE 5 MG: 2.5 TABLET ORAL at 12:16

## 2018-01-23 RX ADMIN — HYDROCODONE BITARTRATE AND ACETAMINOPHEN 1 TABLET: 10; 325 TABLET ORAL at 05:36

## 2018-01-23 RX ADMIN — HYDROCODONE BITARTRATE AND ACETAMINOPHEN 1 TABLET: 10; 325 TABLET ORAL at 10:07

## 2018-01-23 RX ADMIN — ENOXAPARIN SODIUM 40 MG: 40 INJECTION SUBCUTANEOUS at 17:53

## 2018-01-23 RX ADMIN — GABAPENTIN 300 MG: 300 CAPSULE ORAL at 19:48

## 2018-01-23 RX ADMIN — GABAPENTIN 300 MG: 300 CAPSULE ORAL at 14:33

## 2018-01-23 RX ADMIN — MIDODRINE HYDROCHLORIDE 5 MG: 2.5 TABLET ORAL at 17:53

## 2018-01-23 RX ADMIN — CALCIUM CARBONATE 1000 MG: 500 TABLET, CHEWABLE ORAL at 14:43

## 2018-01-23 RX ADMIN — MIDODRINE HYDROCHLORIDE 5 MG: 2.5 TABLET ORAL at 08:16

## 2018-01-23 RX ADMIN — FLUDROCORTISONE ACETATE 0.2 MG: 0.1 TABLET ORAL at 08:16

## 2018-01-23 RX ADMIN — GABAPENTIN 300 MG: 300 CAPSULE ORAL at 08:16

## 2018-01-23 RX ADMIN — Medication 10 ML: at 08:15

## 2018-01-23 RX ADMIN — HYDROCODONE BITARTRATE AND ACETAMINOPHEN 1 TABLET: 10; 325 TABLET ORAL at 14:33

## 2018-01-23 RX ADMIN — SODIUM CHLORIDE: 9 INJECTION, SOLUTION INTRAVENOUS at 08:14

## 2018-01-23 RX ADMIN — CALCIUM CARBONATE 1000 MG: 500 TABLET, CHEWABLE ORAL at 00:25

## 2018-01-23 RX ADMIN — HYDROCODONE BITARTRATE AND ACETAMINOPHEN 1 TABLET: 10; 325 TABLET ORAL at 19:48

## 2018-01-23 RX ADMIN — GUAIFENESIN 600 MG: 600 TABLET, EXTENDED RELEASE ORAL at 19:48

## 2018-01-23 RX ADMIN — HYDROCODONE BITARTRATE AND ACETAMINOPHEN 1 TABLET: 10; 325 TABLET ORAL at 00:58

## 2018-01-23 ASSESSMENT — PAIN DESCRIPTION - LOCATION: LOCATION: BACK

## 2018-01-23 ASSESSMENT — PAIN SCALES - GENERAL
PAINLEVEL_OUTOF10: 3
PAINLEVEL_OUTOF10: 10
PAINLEVEL_OUTOF10: 10
PAINLEVEL_OUTOF10: 5
PAINLEVEL_OUTOF10: 2

## 2018-01-23 ASSESSMENT — PAIN DESCRIPTION - ORIENTATION: ORIENTATION: LOWER

## 2018-01-23 ASSESSMENT — PAIN DESCRIPTION - PAIN TYPE: TYPE: CHRONIC PAIN

## 2018-01-24 ENCOUNTER — HOSPITAL ENCOUNTER (INPATIENT)
Age: 78
LOS: 2 days | Discharge: HOME | DRG: 883 | End: 2018-01-26
Attending: PSYCHIATRY & NEUROLOGY | Admitting: PSYCHIATRY & NEUROLOGY
Payer: MEDICARE

## 2018-01-24 VITALS
WEIGHT: 151.7 LBS | BODY MASS INDEX: 21.24 KG/M2 | RESPIRATION RATE: 18 BRPM | DIASTOLIC BLOOD PRESSURE: 78 MMHG | OXYGEN SATURATION: 95 % | SYSTOLIC BLOOD PRESSURE: 138 MMHG | HEART RATE: 72 BPM | TEMPERATURE: 98 F | HEIGHT: 71 IN

## 2018-01-24 PROCEDURE — 6370000000 HC RX 637 (ALT 250 FOR IP): Performed by: PSYCHIATRY & NEUROLOGY

## 2018-01-24 PROCEDURE — 1240000000 HC EMOTIONAL WELLNESS R&B

## 2018-01-24 PROCEDURE — 6370000000 HC RX 637 (ALT 250 FOR IP): Performed by: PHYSICIAN ASSISTANT

## 2018-01-24 PROCEDURE — 6360000002 HC RX W HCPCS: Performed by: PHYSICIAN ASSISTANT

## 2018-01-24 PROCEDURE — 6370000000 HC RX 637 (ALT 250 FOR IP): Performed by: HOSPITALIST

## 2018-01-24 PROCEDURE — 6370000000 HC RX 637 (ALT 250 FOR IP): Performed by: NURSE PRACTITIONER

## 2018-01-24 PROCEDURE — 99239 HOSP IP/OBS DSCHRG MGMT >30: CPT | Performed by: NURSE PRACTITIONER

## 2018-01-24 PROCEDURE — 97116 GAIT TRAINING THERAPY: CPT

## 2018-01-24 PROCEDURE — 6370000000 HC RX 637 (ALT 250 FOR IP): Performed by: INTERNAL MEDICINE

## 2018-01-24 RX ORDER — PANTOPRAZOLE SODIUM 40 MG/1
40 TABLET, DELAYED RELEASE ORAL
Status: CANCELLED | OUTPATIENT
Start: 2018-01-25

## 2018-01-24 RX ORDER — MIDODRINE HYDROCHLORIDE 5 MG/1
5 TABLET ORAL
Status: DISCONTINUED | OUTPATIENT
Start: 2018-01-25 | End: 2018-01-26 | Stop reason: HOSPADM

## 2018-01-24 RX ORDER — HYDROCODONE BITARTRATE AND ACETAMINOPHEN 10; 325 MG/1; MG/1
1 TABLET ORAL EVERY 4 HOURS PRN
Status: CANCELLED | OUTPATIENT
Start: 2018-01-24

## 2018-01-24 RX ORDER — PANTOPRAZOLE SODIUM 40 MG/1
40 TABLET, DELAYED RELEASE ORAL
Status: DISCONTINUED | OUTPATIENT
Start: 2018-01-25 | End: 2018-01-26 | Stop reason: HOSPADM

## 2018-01-24 RX ORDER — FLUDROCORTISONE ACETATE 0.1 MG/1
0.2 TABLET ORAL DAILY
Status: CANCELLED | OUTPATIENT
Start: 2018-01-25

## 2018-01-24 RX ORDER — GUAIFENESIN 600 MG/1
600 TABLET, EXTENDED RELEASE ORAL 2 TIMES DAILY
Status: CANCELLED | OUTPATIENT
Start: 2018-01-24

## 2018-01-24 RX ORDER — HYDROCODONE BITARTRATE AND ACETAMINOPHEN 5; 325 MG/1; MG/1
1 TABLET ORAL EVERY 4 HOURS PRN
Status: DISCONTINUED | OUTPATIENT
Start: 2018-01-24 | End: 2018-01-26 | Stop reason: HOSPADM

## 2018-01-24 RX ORDER — ACETAMINOPHEN 325 MG/1
650 TABLET ORAL EVERY 4 HOURS PRN
Status: CANCELLED | OUTPATIENT
Start: 2018-01-24

## 2018-01-24 RX ORDER — MIDODRINE HYDROCHLORIDE 2.5 MG/1
5 TABLET ORAL
Status: CANCELLED | OUTPATIENT
Start: 2018-01-24

## 2018-01-24 RX ORDER — CALCIUM CARBONATE 200(500)MG
1000 TABLET,CHEWABLE ORAL 3 TIMES DAILY PRN
Status: CANCELLED | OUTPATIENT
Start: 2018-01-24

## 2018-01-24 RX ORDER — GUAIFENESIN 600 MG/1
600 TABLET, EXTENDED RELEASE ORAL 2 TIMES DAILY
Status: DISCONTINUED | OUTPATIENT
Start: 2018-01-24 | End: 2018-01-26 | Stop reason: HOSPADM

## 2018-01-24 RX ORDER — FLUDROCORTISONE ACETATE 0.1 MG/1
0.2 TABLET ORAL DAILY
Status: DISCONTINUED | OUTPATIENT
Start: 2018-01-25 | End: 2018-01-26 | Stop reason: HOSPADM

## 2018-01-24 RX ORDER — ACETAMINOPHEN 325 MG/1
650 TABLET ORAL EVERY 4 HOURS PRN
Status: DISCONTINUED | OUTPATIENT
Start: 2018-01-24 | End: 2018-01-26 | Stop reason: HOSPADM

## 2018-01-24 RX ORDER — CALCIUM CARBONATE 200(500)MG
1000 TABLET,CHEWABLE ORAL 3 TIMES DAILY PRN
Status: DISCONTINUED | OUTPATIENT
Start: 2018-01-24 | End: 2018-01-26 | Stop reason: HOSPADM

## 2018-01-24 RX ORDER — GABAPENTIN 300 MG/1
300 CAPSULE ORAL 3 TIMES DAILY
Status: DISCONTINUED | OUTPATIENT
Start: 2018-01-24 | End: 2018-01-26 | Stop reason: HOSPADM

## 2018-01-24 RX ORDER — GABAPENTIN 300 MG/1
300 CAPSULE ORAL 3 TIMES DAILY
Status: CANCELLED | OUTPATIENT
Start: 2018-01-24

## 2018-01-24 RX ADMIN — HYDROCODONE BITARTRATE AND ACETAMINOPHEN 1 TABLET: 10; 325 TABLET ORAL at 00:15

## 2018-01-24 RX ADMIN — GABAPENTIN 300 MG: 300 CAPSULE ORAL at 14:17

## 2018-01-24 RX ADMIN — GUAIFENESIN 600 MG: 600 TABLET, EXTENDED RELEASE ORAL at 08:20

## 2018-01-24 RX ADMIN — MIDODRINE HYDROCHLORIDE 5 MG: 2.5 TABLET ORAL at 17:04

## 2018-01-24 RX ADMIN — ENOXAPARIN SODIUM 40 MG: 40 INJECTION SUBCUTANEOUS at 17:04

## 2018-01-24 RX ADMIN — FLUDROCORTISONE ACETATE 0.2 MG: 0.1 TABLET ORAL at 08:21

## 2018-01-24 RX ADMIN — MIDODRINE HYDROCHLORIDE 5 MG: 2.5 TABLET ORAL at 08:20

## 2018-01-24 RX ADMIN — GABAPENTIN 300 MG: 300 CAPSULE ORAL at 08:21

## 2018-01-24 RX ADMIN — HYDROCODONE BITARTRATE AND ACETAMINOPHEN 1 TABLET: 10; 325 TABLET ORAL at 06:28

## 2018-01-24 RX ADMIN — GUAIFENESIN 600 MG: 600 TABLET, EXTENDED RELEASE ORAL at 22:37

## 2018-01-24 RX ADMIN — MIDODRINE HYDROCHLORIDE 5 MG: 2.5 TABLET ORAL at 11:57

## 2018-01-24 RX ADMIN — HYDROCODONE BITARTRATE AND ACETAMINOPHEN 1 TABLET: 10; 325 TABLET ORAL at 17:04

## 2018-01-24 RX ADMIN — HYDROCODONE BITARTRATE AND ACETAMINOPHEN 1 TABLET: 10; 325 TABLET ORAL at 11:51

## 2018-01-24 RX ADMIN — HYDROCODONE BITARTRATE AND ACETAMINOPHEN 1 TABLET: 5; 325 TABLET ORAL at 22:37

## 2018-01-24 RX ADMIN — PANTOPRAZOLE SODIUM 40 MG: 40 TABLET, DELAYED RELEASE ORAL at 06:28

## 2018-01-24 RX ADMIN — GABAPENTIN 300 MG: 300 CAPSULE ORAL at 22:37

## 2018-01-24 ASSESSMENT — PAIN SCALES - GENERAL
PAINLEVEL_OUTOF10: 10
PAINLEVEL_OUTOF10: 4
PAINLEVEL_OUTOF10: 10
PAINLEVEL_OUTOF10: 10
PAINLEVEL_OUTOF10: 5
PAINLEVEL_OUTOF10: 2

## 2018-01-24 ASSESSMENT — SLEEP AND FATIGUE QUESTIONNAIRES
DO YOU USE A SLEEP AID: NO
AVERAGE NUMBER OF SLEEP HOURS: 12
DO YOU HAVE DIFFICULTY SLEEPING: NO

## 2018-01-24 ASSESSMENT — LIFESTYLE VARIABLES: HISTORY_ALCOHOL_USE: NO

## 2018-01-25 PROBLEM — G24.9 DYSKINESIA: Status: ACTIVE | Noted: 2018-01-25

## 2018-01-25 LAB — DHEA: 1.12 NG/ML (ref 0.63–4.7)

## 2018-01-25 PROCEDURE — 6370000000 HC RX 637 (ALT 250 FOR IP): Performed by: PSYCHIATRY & NEUROLOGY

## 2018-01-25 PROCEDURE — 6370000000 HC RX 637 (ALT 250 FOR IP): Performed by: NURSE PRACTITIONER

## 2018-01-25 PROCEDURE — 99223 1ST HOSP IP/OBS HIGH 75: CPT | Performed by: PSYCHIATRY & NEUROLOGY

## 2018-01-25 PROCEDURE — 1240000000 HC EMOTIONAL WELLNESS R&B

## 2018-01-25 RX ADMIN — GABAPENTIN 300 MG: 300 CAPSULE ORAL at 20:34

## 2018-01-25 RX ADMIN — PANTOPRAZOLE SODIUM 40 MG: 40 TABLET, DELAYED RELEASE ORAL at 06:19

## 2018-01-25 RX ADMIN — GUAIFENESIN 600 MG: 600 TABLET, EXTENDED RELEASE ORAL at 08:50

## 2018-01-25 RX ADMIN — HYDROCODONE BITARTRATE AND ACETAMINOPHEN 1 TABLET: 5; 325 TABLET ORAL at 19:38

## 2018-01-25 RX ADMIN — FLUDROCORTISONE ACETATE 0.2 MG: 0.1 TABLET ORAL at 08:50

## 2018-01-25 RX ADMIN — GABAPENTIN 300 MG: 300 CAPSULE ORAL at 13:13

## 2018-01-25 RX ADMIN — MIDODRINE HYDROCHLORIDE 5 MG: 5 TABLET ORAL at 16:07

## 2018-01-25 RX ADMIN — HYDROCODONE BITARTRATE AND ACETAMINOPHEN 1 TABLET: 5; 325 TABLET ORAL at 23:33

## 2018-01-25 RX ADMIN — GUAIFENESIN 600 MG: 600 TABLET, EXTENDED RELEASE ORAL at 20:34

## 2018-01-25 RX ADMIN — ACETAMINOPHEN 650 MG: 325 TABLET, FILM COATED ORAL at 13:13

## 2018-01-25 RX ADMIN — HYDROCODONE BITARTRATE AND ACETAMINOPHEN 1 TABLET: 5; 325 TABLET ORAL at 16:06

## 2018-01-25 RX ADMIN — MIDODRINE HYDROCHLORIDE 5 MG: 5 TABLET ORAL at 11:41

## 2018-01-25 RX ADMIN — GABAPENTIN 300 MG: 300 CAPSULE ORAL at 08:50

## 2018-01-25 RX ADMIN — HYDROCODONE BITARTRATE AND ACETAMINOPHEN 1 TABLET: 5; 325 TABLET ORAL at 11:40

## 2018-01-25 RX ADMIN — CARBIDOPA AND LEVODOPA 1 TABLET: 25; 100 TABLET ORAL at 20:38

## 2018-01-25 RX ADMIN — MIDODRINE HYDROCHLORIDE 5 MG: 5 TABLET ORAL at 08:51

## 2018-01-25 RX ADMIN — HYDROCODONE BITARTRATE AND ACETAMINOPHEN 1 TABLET: 5; 325 TABLET ORAL at 07:34

## 2018-01-25 ASSESSMENT — PAIN DESCRIPTION - DESCRIPTORS: DESCRIPTORS: CONSTANT

## 2018-01-25 ASSESSMENT — PAIN DESCRIPTION - LOCATION: LOCATION: BACK

## 2018-01-25 ASSESSMENT — PAIN SCALES - GENERAL
PAINLEVEL_OUTOF10: 3
PAINLEVEL_OUTOF10: 5
PAINLEVEL_OUTOF10: 8
PAINLEVEL_OUTOF10: 8
PAINLEVEL_OUTOF10: 10
PAINLEVEL_OUTOF10: 10
PAINLEVEL_OUTOF10: 5
PAINLEVEL_OUTOF10: 10
PAINLEVEL_OUTOF10: 10

## 2018-01-25 ASSESSMENT — PAIN DESCRIPTION - PAIN TYPE: TYPE: CHRONIC PAIN

## 2018-01-25 ASSESSMENT — PAIN DESCRIPTION - ORIENTATION: ORIENTATION: LOWER

## 2018-01-25 ASSESSMENT — PAIN DESCRIPTION - FREQUENCY: FREQUENCY: CONTINUOUS

## 2018-01-25 NOTE — PLAN OF CARE
Problem: Nutrition  Goal: Optimal nutrition therapy  Nutrition Problem: Swallowing difficulty  Intervention: Food and/or Nutrient Delivery: Continue current diet, Continue current ONS  Nutritional Goals: Meet nutritional needs through PO intake. No issues with swallowing.     Outcome: Ongoing

## 2018-01-25 NOTE — CONSULTS
Inpatient consult to Neurology  Consult performed by: Maryagnes Jeans ordered by: Jefry Gan Neurology Consult        Patient:   Samir Norton  MR#:    [id number]  Account Number:                   [id number]      Room:    [address] [place] [address]   YOB: 1940  Date of Progress Note: 1/25/2018  Time of Note                           4:39 PM  Attending Physician:  Jessica Vegas MD  Consulting Physician:   Karl Laurent M.D.        [address] [place] [address] Avenue:  Involuntary movements       HISTORY OF PRESENT ILLNESS:   This 78 y.o. male who is admitted to inpatient psychiatric service. Crohn's consultation is called for an alternate movements. The patient has had extensive outpatient evaluation with Dr. adorno regarding these involuntary movements. Can see his progress notes for further detail. His MRI of the brain along with blood work has been unremarkable. I do not see Spalding's disease profile testing. He is unclear how long He has had these involuntary movements. He is awaiting evaluation at a movement disorder center. REVIEW OF SYSTEMS:  Constitutional  No fever or chills. No diaphoresis or significant fatigue. HENT   No tinnitus or significant hearing loss. Eyes  no sudden vision change or eye pain  Respiratory  no significant shortness of breath or cough  Cardiovascular  no chest pain No palpitations or significant leg swelling  Gastrointestinal  no abdominal swelling or pain. Genitourinary  No difficulty urinating, dysuria  Musculoskeletal  significant back pain or myalgia. Skin  no color change or rash  Neurologic  No seizures. No lateralizing weakness. Hematologic  no easy bruising or excessive bleeding. Psychiatric  no severe anxiety or nervousness. All other review of systems are negative.       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 Eyes  conjunctiva normal.  Pupils react to light  Ear, nose, throat -hearing intact to finger rub No scars, masses, or lesions over external nose or ears, no atrophy of tongue  Neck-symmetric, no masses noted, no jugular vein distension  Respiration- chest wall appears symmetric, good expansion,   normal effort without use of accessory muscles  Musculoskeletal  no significant wasting of muscles noted, no bony deformities  Extremities-no clubbing, cyanosis or edema  Skin  warm, dry, and intact. No rash, erythema, or pallor. Psychiatric  mood, affect, and behavior appear normal.      Neurological exam  Awake, alert, fluent oriented x 2 quite talkative perseveration.  Fixated on his back issues  Attention and concentration impaired  Recent and remote memory appears impaired  Speech with  dysarthria  No clear issues with language of fund of knowledge    Cranial Nerve Exam   CN II- Visual fields grossly unremarkable  CN III, IV,VI-EOMI, No nystagmus, conjugate eye movements, no ptosis  CN V-sensation intact to LT over face  CN VII-no facial assymetry  CN VIII-Hearing intact to finger rub  CN IX and X- Palate elevates in midline  CN XI-good shoulder shrug  CN XII-Tongue midline with no fasciculations or fibrillations    Motor Exam  V/V throughout upper and lower extremities bilaterally, no cogwheeling, normal tone    Sensory Exam  Sensation intact to light touch and temperature upper and lower extremities bilaterally    Reflexes   2+ biceps bilaterally  2+ brachioradialis  2+patella  2+ ankle jerks  No clonus ankles  No Saeed's sign bilateral hands    Tremors- no tremors in hands or head noted Dyskinesias noted in the arms and legs    GaitWith a walker     Coordination  Finger to nose-unremarkable        CBC:   Recent Labs

## 2018-01-25 NOTE — PLAN OF CARE
Problem: Pain:  Goal: Pain level will decrease  Pain level will decrease   Outcome: Ongoing                                                                      Group Therapy Note    Date: 1/25/2018  Start Time: 1000  End Time:  1100  Number of Participants: 8    Type of Group: Psychoeducation    Wellness Binder Information  Module Name:  [address] [place] [address]  Session Number:  1    Patient's Goal:  To improve knowledge of facts about depression    Notes:  Pt attended, but did not participate in group activity, left group, complained of back pain.     Status After Intervention:  Unchanged    Participation Level: None    Participation Quality: Resistant      Speech:  pressured      Thought Process/Content: Linear      Affective Functioning: Flat      Mood: anxious and depressed      Level of consciousness:  Alert and Preoccupied      Response to Learning: Resistant      Endings: None Reported    Modes of Intervention: Education      Discipline Responsible: Psychoeducational Specialist      Signature:  Elin Ramos

## 2018-01-26 ENCOUNTER — CARE COORDINATION (OUTPATIENT)
Dept: CARE COORDINATION | Age: 78
End: 2018-01-26

## 2018-01-26 VITALS
DIASTOLIC BLOOD PRESSURE: 82 MMHG | HEIGHT: 71 IN | HEART RATE: 72 BPM | WEIGHT: 145.8 LBS | SYSTOLIC BLOOD PRESSURE: 140 MMHG | BODY MASS INDEX: 20.41 KG/M2 | OXYGEN SATURATION: 92 % | TEMPERATURE: 97.7 F | RESPIRATION RATE: 18 BRPM

## 2018-01-26 PROBLEM — R45.89 SUICIDAL BEHAVIOR: Status: ACTIVE | Noted: 2018-01-26

## 2018-01-26 PROBLEM — R45.89 SUICIDAL BEHAVIOR WITHOUT ATTEMPTED SELF-INJURY: Status: ACTIVE | Noted: 2018-01-26

## 2018-01-26 PROBLEM — F63.9: Status: ACTIVE | Noted: 2018-01-26

## 2018-01-26 LAB
CORTISOL FREE, SERUM: 1.29
TSH SERPL DL<=0.05 MIU/L-ACNC: 6.28 UIU/ML (ref 0.27–4.2)
VITAMIN B-12: 432 PG/ML (ref 211–946)
VITAMIN D 25-HYDROXY: 15.2 NG/ML

## 2018-01-26 PROCEDURE — 82306 VITAMIN D 25 HYDROXY: CPT

## 2018-01-26 PROCEDURE — 5130000000 HC BRIDGE APPOINTMENT

## 2018-01-26 PROCEDURE — 99239 HOSP IP/OBS DSCHRG MGMT >30: CPT | Performed by: PSYCHIATRY & NEUROLOGY

## 2018-01-26 PROCEDURE — 81401 MOPATH PROCEDURE LEVEL 2: CPT

## 2018-01-26 PROCEDURE — 99233 SBSQ HOSP IP/OBS HIGH 50: CPT | Performed by: PSYCHIATRY & NEUROLOGY

## 2018-01-26 PROCEDURE — 84443 ASSAY THYROID STIM HORMONE: CPT

## 2018-01-26 PROCEDURE — 6370000000 HC RX 637 (ALT 250 FOR IP): Performed by: NURSE PRACTITIONER

## 2018-01-26 PROCEDURE — 6370000000 HC RX 637 (ALT 250 FOR IP): Performed by: FAMILY MEDICINE

## 2018-01-26 PROCEDURE — 82607 VITAMIN B-12: CPT

## 2018-01-26 PROCEDURE — 6370000000 HC RX 637 (ALT 250 FOR IP): Performed by: PSYCHIATRY & NEUROLOGY

## 2018-01-26 PROCEDURE — 36415 COLL VENOUS BLD VENIPUNCTURE: CPT

## 2018-01-26 RX ORDER — FLUDROCORTISONE ACETATE 0.1 MG/1
0.2 TABLET ORAL DAILY
Qty: 60 TABLET | Refills: 3 | Status: SHIPPED | OUTPATIENT
Start: 2018-01-27 | End: 2018-02-26

## 2018-01-26 RX ORDER — MIDODRINE HYDROCHLORIDE 5 MG/1
5 TABLET ORAL
Qty: 90 TABLET | Refills: 0 | Status: SHIPPED | OUTPATIENT
Start: 2018-01-26

## 2018-01-26 RX ORDER — ERGOCALCIFEROL (VITAMIN D2) 1250 MCG
50000 CAPSULE ORAL WEEKLY
Qty: 11 CAPSULE | Refills: 0 | Status: SHIPPED | OUTPATIENT
Start: 2018-01-26 | End: 2018-04-07

## 2018-01-26 RX ORDER — ERGOCALCIFEROL 1.25 MG/1
50000 CAPSULE ORAL WEEKLY
Status: DISCONTINUED | OUTPATIENT
Start: 2018-01-26 | End: 2018-01-26 | Stop reason: HOSPADM

## 2018-01-26 RX ADMIN — GABAPENTIN 300 MG: 300 CAPSULE ORAL at 14:27

## 2018-01-26 RX ADMIN — ERGOCALCIFEROL 50000 UNITS: 1.25 CAPSULE ORAL at 10:40

## 2018-01-26 RX ADMIN — FLUDROCORTISONE ACETATE 0.2 MG: 0.1 TABLET ORAL at 09:09

## 2018-01-26 RX ADMIN — GABAPENTIN 300 MG: 300 CAPSULE ORAL at 09:09

## 2018-01-26 RX ADMIN — MIDODRINE HYDROCHLORIDE 5 MG: 5 TABLET ORAL at 12:09

## 2018-01-26 RX ADMIN — MIDODRINE HYDROCHLORIDE 5 MG: 5 TABLET ORAL at 09:10

## 2018-01-26 RX ADMIN — CARBIDOPA AND LEVODOPA 1 TABLET: 25; 100 TABLET ORAL at 09:09

## 2018-01-26 RX ADMIN — HYDROCODONE BITARTRATE AND ACETAMINOPHEN 1 TABLET: 5; 325 TABLET ORAL at 09:10

## 2018-01-26 RX ADMIN — PANTOPRAZOLE SODIUM 40 MG: 40 TABLET, DELAYED RELEASE ORAL at 05:25

## 2018-01-26 RX ADMIN — GUAIFENESIN 600 MG: 600 TABLET, EXTENDED RELEASE ORAL at 09:09

## 2018-01-26 RX ADMIN — CARBIDOPA AND LEVODOPA 1 TABLET: 25; 100 TABLET ORAL at 14:27

## 2018-01-26 RX ADMIN — HYDROCODONE BITARTRATE AND ACETAMINOPHEN 1 TABLET: 5; 325 TABLET ORAL at 05:24

## 2018-01-26 ASSESSMENT — PAIN SCALES - GENERAL
PAINLEVEL_OUTOF10: 10
PAINLEVEL_OUTOF10: 6
PAINLEVEL_OUTOF10: 8

## 2018-01-26 NOTE — PROGRESS NOTES
BHI Daily Shift Assessment  Nursing Progress Note    Room: [facility]/618-01 Name: Myriam Duran Age: 78 y.o.    Ethnicity:  Gender: male   Dx: <principal problem not specified>  Precautions: suicide risk and fall risk  CPAP: No Accu-Chek: No  MSE:  Status and Exam  Normal: No  Facial Expression: Avoids Gaze, Flat (anger for not recieving pain medications on time)  Affect: Blunt  Level of Consciousness: Alert (dskensia, speech slurred)  Mood:Normal: No  Mood: Anxious, Angry, Irritable  Motor Activity:Normal: No  Motor Activity: Unusual Posture/Gait, Repetitive Acts, Tics, Tremors, Increased  Interview Behavior: Impulsive, Irritable, Aggressive  Preception: [place] to Person, Radha Conchis to Time, [place] to Place, [place] to Situation  Attention:Normal: No  Attention: Unable to Concentrate  Thought Processes: Other(See comment) (impulsive, angry about pain medications not being given on time)  Thought Content:Normal: No  Thought Content: Preoccupations, Obsessions  Hallucinations: None  Delusions: No  Memory:Normal: No  Memory: Poor Recent  Insight and Judgment: No  Insight and Judgment: Poor Judgment, Poor Insight  Present Suicidal Ideation: No  Present Homicidal Ideation: No  Sleep: No, Poor, has interrupted sleep and has restless sleep Hours Slept: 5 Sched Sleep Meds: No PRN Sleep Meds: No Other PRN Meds: Yes Med Compliant: Yes Appetite: good Percent Meals: 100% Social: Yes ADLs: Yes Speech: pressured, hesitant and slurred Depression: denies Anxiety: denies, but expressing anger and irritation r/t not receiving pain medication on time, Norco 5mg q4hrs prn, pt ir, pt stated that he was given pain medication in a timely manner while on the other floor, showed anger and aggression to nursing staff while administering medication this shift, pt came to other nurse to clarify how his pain medications are to be given prn, pt was told that they are prn and that he is to request them and they are to be given as timely
Collateral obtained from: Boy Dodd, sister, [phone]    Immediate Stressors, what happened, when the episode began:  Sister reports pt's biggest complaint is \"his pain. \" Pt's sister reports she is unsure of the pt's pain level and wishes the pt could find out or have a spinal tap. Pt has an appointment in early May. Pt's sister reports that she feels the pt said what was said regarding harming himself, \"out of anger, he can't do the things he used to do. \"    Diagnosis/History of compliance with medications: Sister reports she is not aware of any diagnosis of psychiatric issues. Sister reports pt takes his medications \"very well\" and has everything ready as written on the bottle. Treatment history (any past hospitalizations? are they going anywhere currently for outpatient services?): Pt's sister reports she is not aware of any inpatient or outpatient services. Support System? Who is medication managed by? (Discuss the importance of medication management and locking extra medication in a secured location. Reccommend this. Did the collateral voice understanding and agreement?): Sister reports she is the pt's primary support system and medication is managed by pt. Pt's sister voiced her understanding and agreement regarding med management and locking up extra medications in a secured location. Pt's sister reported that at this time she has the pt's medications locked up and she will speak with him about helping him manage, however, reports Stefanie Alvarado is very hardheaded, you have no idea. \" Sister agreed she wants him safe and will try to help the best she can. Family history of psychiatric issues (did the patient's parents/grandparents/siblings have any issues?): None reported by pt's sister. Substance Abuse (Does the patient have a history or currently use any substances?): Pt's sister reports that \"many, many years ago\" the pt was an alcoholic.  Sister reports that the pt no longer drinks anything to her
Discharge Note    Pt discharging on this date. Pt denies SI, HI, and AVH at this time. Pt reports improvement in behavior and is leaving unit in overall good condition. Pt to follow up with [address] on 1/30/18 at 2 PM for intake/assessment and again on 1/31/18 at 1:30 PM for medication management. Pt also has a follow up appointment on 2/5/18 at 2:30 PM with PCP. CSW and pt discussed pt's follow up appointments and importance of attending appointments as scheduled, pt voiced understanding and agreement. Pt able to verbally identify: warning signs, coping strategies, places and people that help make the pt feel better/distract negative thoughts, friends/family/agencies/professionals the pt can reach out to in a crisis, and something that is important to the pt/worth living for. Pt provided the national suicide prevention hotline number (3-992-554-096-467-7434) as well as local community behavioral health [facility]) crisis number for emergencies (2-939-958-808-885-5738), in d/c folder.   Patient refused tobacco cessation counseling
Gave pt Norco at [phone] and patient became agitated and stated \"why do I keep getting my pain medicine late. \"  Explained that his medicine was scheduled as needed and he had to ask for the medication. Pt got up out of bed and shoved me with his hand and stated \"I'm in pain and need my medicine . \"
Group Therapy Note    Date: 1/25/2018  Start Time: 1800  End Time:  1830  Number of Participants: 4    Type of Group: Wrap-Up        Status After Intervention:  Unchanged    Participation Level: Minimal    Participation Quality: Attentive      Speech:  normal      Thought Process/Content: Logical      Affective Functioning: Congruent      Mood: anxious and irritable      Level of consciousness:  Alert      Response to Learning: Progressing to goal      Endings: None Reported    Modes of Intervention: Socialization      Discipline Responsible: Registered Nurse      Signature:  Jazmyn Palacios RN
Patient resting in bed at this time, no complaints voiced. Will continue to monitor for safety.
Patient:   Shellie Payan  MR#:    [id number]   Room:    [facility]-   YOB: 1940  Date of Progress Note: 1/26/2018  Time of Note                           8:08 AM  Consulting Physician:   Thor Henry M.D. Attending Physician:  Phillis Dakins, MD     Chief complaint Involuntary movements     S:This 78 y.o. male  who is admitted to inpatient psychiatric service. Crohn's consultation is called for an alternate movements. The patient has had extensive outpatient evaluation with Dr. adorno regarding these involuntary movements. Can see his progress notes for further detail. His MRI of the brain along with blood work has been unremarkable. I do not see Teodora's disease profile testing. He is unclear how long He has had these involuntary movements. He is awaiting evaluation at a movement disorder center.     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    Arthritis     Back pain     Cerebral artery occlusion with cerebral infarction ([address]     Chronic bilateral low back pain without sciatica 1/22/2018    Movement disorder 1/17/2018    Sciatica        Past Surgical History:      Procedure Laterality Date    CHOLECYSTECTOMY      EYE SURGERY      plates and screw around left eye    FOOT SURGERY      VASCULAR SURGERY      Pt reports receiving a femoral stent placed in [place], doesn't know what year       Medications in Hospital:      Current Facility-Administered Medications:     carbidopa-levodopa (SINEMET)  MG per tablet 1 tablet, 1 tablet, Oral, TID, Thor Henry MD, 1 tablet at 01/25/18 2038    acetaminophen (TYLENOL) tablet 650 mg, 650 mg, Oral, Q4H PRN, Magdalena Cuello,
SW met with treatment team to discuss pt's progress and setbacks. SW 2 was present. Pt reportedly slept last night, appetite is good, attended scheduled group activity yesterday morning, but left complaining of pain, refused to attend afternoon activities, isolative, stayed in bed most of the day, has involuntary movements, unsteady gait, walks with a walker, performs ADL's, focused on pain medications, agitated and aggressive towards staff over not receiving prn medication on time, denies depression/anxiety, denies SI/HI/AVH, continue current treatment plan.
None  Jewelry: Watch  Body Piercings Removed: N/A  Clothing:  (see list)  Were All Patient Medications Collected?: Not Applicable  Other Valuables:  (see list)     Valuables sent home with na. Valuables placed in safe in security envelope. . Patient's home medications were na. Patient oriented to surroundings and program expectations and copy of patient rights given. Received admission packet:  yes. Consents reviewed, signed yes. Refused na. Patient verbalize understanding:  yes. Patient education on precautions: yes    Pt arrived on unit accompanied by security and tech. Pt oriented to unit and searched.    Pt refused flu and pneumonia shot                   Feng Bhat RN

## 2018-01-26 NOTE — FLOWSHEET NOTE
01/26/18 1514   Provider Notification   Reason for Communication Evaluate  (Removal of sutures. )   Provider Name Yoni Harness   Provider Notification Physician   Method of Communication Call   Response Waiting for response   Electronically signed by Jolene Bell RN on 1/26/2018 at 3:15 PM

## 2018-01-26 NOTE — H&P
HISTORY and PHYSICAL      CHIEF COMPLAINT:  SI    Reason for Admission:  SI    History Obtained From:  patient    HISTORY OF PRESENT ILLNESS:      The patient is a 78 y.o. male who is admitted to the [place] [address] unit with worsening mood issues. He has had no c/o CP or SOA. No abdominal pain or N/V. No dysuria. He has had c/o back pain, which is chronic. He denies any recent fevers. 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    Arthritis     Back pain     Cerebral artery occlusion with cerebral infarction ([address]     Chronic bilateral low back pain without sciatica 1/22/2018    Movement disorder 1/17/2018    Sciatica      Past Surgical History:        Procedure Laterality Date    CHOLECYSTECTOMY      EYE SURGERY      plates and screw around left eye    FOOT SURGERY      VASCULAR SURGERY      Pt reports receiving a femoral stent placed in [place], doesn't know what year         Medications Prior to Admission:    Prescriptions Prior to Admission: HYDROcodone-acetaminophen (NORCO)  MG per tablet, Take 1 tablet by mouth every 6 hours as needed for Pain for up to 30 days. gabapentin (NEURONTIN) 300 MG capsule, Take 1 capsule by mouth 3 times daily for 30 days. Allergies:  Review of patient's allergies indicates no known allergies. Social History:   TOBACCO:   reports that he has quit smoking. His smoking use included Cigarettes. He has never used smokeless tobacco.  ETOH:   reports that he does not drink alcohol. DRUGS:   reports that he does not use drugs.       Family History:       Problem Relation Age of Onset    Heart Disease Mother     Heart Disease Father      REVIEW OF SYSTEMS:  Constitutional: neg  CV: neg  Pulmonary: neg  GI: neg  : neg  Psych: SI  Neuro: neg  Skin: neg  MusculoSkeletal: chronic back pain  HEENT: neg  Joints: neg    Vitals:  BP (!) 148/84   Pulse 82   Temp 97.8 °F (36.6 °C) (Temporal)   Resp 20   Ht 5' 11\" (1.803 m)   Wt 145 lb 12.8 oz (66.1 kg)   SpO2 94%   BMI 20.33 kg/m²     PHYSICAL EXAM:  Gen: NAD, alert  HEENT: WNL  Lymph: no LAD  Neck: no JVD or masses  Chest: CTA bilat  CV: RRR  Abdomen: NT/ND  Extrem: no C/C/E  Neuro: non focal  Skin: no rashes  Joints: no redness    DATA:  I have reviewed the admission labs and imaging tests.     ASSESSMENT AND PLAN:      Patient Active Hospital Problem List:   Lumbar degenerative disc disease---supportive care   Movement disorder, Dyskinesia---Neurology consult    Chronic Back Pain---supportive care for pain   Elevated BP----monitor BP   Anemia, Thrombocytopenia----monitor lab        Austin Manriquez MD  11:42 PM 1/25/2018

## 2018-01-26 NOTE — PLAN OF CARE
Problem: Altered Mood, Depressive Behavior  Goal: STG-Able to verbalize support system  Pt identifies sister, Perry Dudley, as his primary support.

## 2018-01-26 NOTE — TELEPHONE ENCOUNTER
----- Message from Prisca Whitfield RN sent at 1/26/2018  2:39 PM CST -----  Contact: Pt's sister, Thor Nissen. She's also his PA.   1/26/18--SAMI, RN, ACC at [phone]  Pt was discharged from hospital today (Friday, 1/26/18). I set up hospital f/u appt. The office was unreachable. It is set for Monday, 2/5/18 at 1:30 pm (30 min). The pt and sister have also requested the following:  home health with PT  shower chair  and a raised toilet seat. Sister has already purchased a rolling walker with a seat. If you choose to order any of these, please reply to this and I'll tell sister and pt. If you have any questions, please let me know. Thank you. Prisca Whitfield, [address] [place] [address]--Internal, Family, and Primary Care-[place]  O: [phone] (ext. [phone])  C: [phone]  Email: [email or web address]

## 2018-01-26 NOTE — TELEPHONE ENCOUNTER
----- Message from Prisca Whitfield RN sent at 1/26/2018  2:39 PM CST -----  Contact: Pt's sister, Thor Nissen. She's also his PA.   1/26/18--SAMI, RN, ACC at [address]  Guero was discharged from hospital today (Friday, 1/26/18). I set up hospital f/u appt. The office was unreachable. It is set for Monday, 2/5/18 at 1:30 pm (30 min). The pt and sister have also requested the following:  home health with PT  shower chair  and a raised toilet seat. Sister has already purchased a rolling walker with a seat. If you choose to order any of these, please reply to this and I'll tell sister and pt. If you have any questions, please let me know. Thank you. Prisca Whitfield, [address] [place] [address]--Internal, Family, and Primary Care-[place]  O: [phone] (ext. [phone])  C: [phone]  Email: [email or web address]

## 2018-01-26 NOTE — BH NOTE
Patient refused.
one time he said he thought  this was July, then corrected himself. Judgment and insight appeared to be  limited. Attention span appears good. Appetite has also been good as is  his sleep, except when he is bothered by pain. He has a good vocabulary. 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 AXIS II:  None. AXIS III:  Please see above. AXIS IV:  Please see above. AXIS V:  65 to 70. TREATMENT PLAN:  I have asked for a consult from his neurologist, Dr. Brandyn Zhong  to help clarify the etiology of his presentation. He will take part in  group therapy as well as individual therapy. At this point in time, I am  not going to prescribe medications for him.         Zaki Ramirez MD    D: 01/25/2018 17:13:58       T: 01/25/2018 19:42:28     [id number]  Job#: [id number]     Doc#: [id number]    CC:

## 2018-01-27 DIAGNOSIS — R53.1 GENERALIZED WEAKNESS: Primary | ICD-10-CM

## 2018-01-29 ENCOUNTER — CARE COORDINATION (OUTPATIENT)
Dept: CARE COORDINATION | Age: 78
End: 2018-01-29

## 2018-01-29 LAB
EKG P AXIS: 54 DEGREES
EKG P-R INTERVAL: 198 MS
EKG Q-T INTERVAL: 388 MS
EKG QRS DURATION: 118 MS
EKG QTC CALCULATION (BAZETT): 419 MS
EKG T AXIS: 4 DEGREES

## 2018-01-29 NOTE — CARE COORDINATION
Pt's line has no voicemail set up and no one answered. Sister phone, left voicemail, no answer. Will call back later in the week if no response.

## 2018-01-29 NOTE — CARE COORDINATION
Pt sister, Hayden Britton, called me back. She was very distraught. Found brother (pt) . Her  was calling 911. She told me she needed to call her other brothers. I told her I would check on her later or early tomorrow. Will let PCP know.

## 2018-01-29 NOTE — CARE COORDINATION
[facility]   4/30/2018 1:00 PM Claudia Coombs MD [facility]   5/8/2018 1:45 PM DO [facility]

## 2018-01-31 ENCOUNTER — CARE COORDINATION (OUTPATIENT)
Dept: INTERNAL MEDICINE CLINIC | Age: 78
End: 2018-01-31

## 2018-02-07 ENCOUNTER — TELEPHONE (OUTPATIENT)
Dept: NEUROSURGERY | Age: 78
End: 2018-02-07

## 2018-02-07 ENCOUNTER — CARE COORDINATION (OUTPATIENT)
Dept: CARE COORDINATION | Age: 78
End: 2018-02-07

## 2018-02-07 NOTE — CARE COORDINATION
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 I was unable to determine by looking at the miscellaneous lab order if completed, done, etc. Called lab and they stated that this test needs an MD signature to run. Specimen had already been collected and processed. Only thing it was missing was a signature. When taken to Dr. Yeni Prieto, he stated, since the pt is already , cancel the lab order. The pt's sister was called and notified that the test had been cancelled. She wanted to know Dr. Rhina Reynoso office number to call and ask question. I told her it was [phone]. Sister also asked if I had talked with inpatient psych unit to ask why pt was discharged before 72 hr hold was up. Instructed her that she would have to call and talk with someone in that department. Number couldn't be found readily, so I instruced pt to call the main  and ask for inpatient psych. Tigist Jackson was the  that was on duty in the unit that day. Told pt to ask for her. Verbalized understanding on both. No more follow up's necessary. Told sister to call if she needed anything else pertaining to the pt. Verbalized understanding again.

## 2018-02-07 NOTE — TELEPHONE ENCOUNTER
Patients sister and POA called today to inquire about the Huningtons disease results. I told her that the patient passed away before the test was done and Dr. Ashwin Cool decided to not move forward with it. I told Dona Aguilera that I contacted the lab to see if we could still do it and they said it wasn't an option they only keep things for a week. I explained  This to Dona Aguilera and told her she could always check with the  and see if they had drawn any labs that we might be able to test it with. She was going to reach out to him and give us a call back.